# Patient Record
Sex: FEMALE | Race: WHITE | NOT HISPANIC OR LATINO | Employment: FULL TIME | URBAN - METROPOLITAN AREA
[De-identification: names, ages, dates, MRNs, and addresses within clinical notes are randomized per-mention and may not be internally consistent; named-entity substitution may affect disease eponyms.]

---

## 2017-03-30 ENCOUNTER — OFFICE VISIT (OUTPATIENT)
Dept: FAMILY MEDICINE | Facility: CLINIC | Age: 52
End: 2017-03-30
Payer: COMMERCIAL

## 2017-03-30 VITALS
HEART RATE: 50 BPM | OXYGEN SATURATION: 98 % | HEIGHT: 63 IN | TEMPERATURE: 98.1 F | BODY MASS INDEX: 21.62 KG/M2 | SYSTOLIC BLOOD PRESSURE: 126 MMHG | DIASTOLIC BLOOD PRESSURE: 79 MMHG | WEIGHT: 122 LBS | RESPIRATION RATE: 10 BRPM

## 2017-03-30 DIAGNOSIS — Z12.11 SCREENING FOR COLON CANCER: ICD-10-CM

## 2017-03-30 DIAGNOSIS — Z13.6 CARDIOVASCULAR SCREENING; LDL GOAL LESS THAN 160: ICD-10-CM

## 2017-03-30 DIAGNOSIS — F32.9 MAJOR DEPRESSIVE DISORDER WITH SINGLE EPISODE, REMISSION STATUS UNSPECIFIED: ICD-10-CM

## 2017-03-30 DIAGNOSIS — Z77.122 EXPOSED TO NOISE: ICD-10-CM

## 2017-03-30 DIAGNOSIS — F32.5 MAJOR DEPRESSIVE DISORDER, SINGLE EPISODE, IN FULL REMISSION (H): ICD-10-CM

## 2017-03-30 DIAGNOSIS — Z00.00 ROUTINE GENERAL MEDICAL EXAMINATION AT A HEALTH CARE FACILITY: Primary | ICD-10-CM

## 2017-03-30 PROCEDURE — 99396 PREV VISIT EST AGE 40-64: CPT | Performed by: FAMILY MEDICINE

## 2017-03-30 RX ORDER — ESCITALOPRAM OXALATE 20 MG/1
20 TABLET ORAL DAILY
Qty: 90 TABLET | Refills: 0 | Status: SHIPPED | OUTPATIENT
Start: 2017-03-30 | End: 2017-11-07

## 2017-03-30 ASSESSMENT — ANXIETY QUESTIONNAIRES
6. BECOMING EASILY ANNOYED OR IRRITABLE: NEARLY EVERY DAY
1. FEELING NERVOUS, ANXIOUS, OR ON EDGE: NEARLY EVERY DAY
3. WORRYING TOO MUCH ABOUT DIFFERENT THINGS: NEARLY EVERY DAY
IF YOU CHECKED OFF ANY PROBLEMS ON THIS QUESTIONNAIRE, HOW DIFFICULT HAVE THESE PROBLEMS MADE IT FOR YOU TO DO YOUR WORK, TAKE CARE OF THINGS AT HOME, OR GET ALONG WITH OTHER PEOPLE: VERY DIFFICULT
GAD7 TOTAL SCORE: 21
5. BEING SO RESTLESS THAT IT IS HARD TO SIT STILL: NEARLY EVERY DAY
7. FEELING AFRAID AS IF SOMETHING AWFUL MIGHT HAPPEN: NEARLY EVERY DAY
2. NOT BEING ABLE TO STOP OR CONTROL WORRYING: NEARLY EVERY DAY

## 2017-03-30 ASSESSMENT — PATIENT HEALTH QUESTIONNAIRE - PHQ9: 5. POOR APPETITE OR OVEREATING: NEARLY EVERY DAY

## 2017-03-30 NOTE — NURSING NOTE
"Chief Complaint   Patient presents with     Physical       Initial /79  Pulse 50  Temp 98.1  F (36.7  C) (Oral)  Resp 10  Ht 5' 2.75\" (1.594 m)  Wt 122 lb (55.3 kg)  LMP 06/11/2016  SpO2 98%  BMI 21.78 kg/m2 Estimated body mass index is 21.78 kg/(m^2) as calculated from the following:    Height as of this encounter: 5' 2.75\" (1.594 m).    Weight as of this encounter: 122 lb (55.3 kg).  Medication Reconciliation: complete     Melissa Orellana MA     "

## 2017-03-30 NOTE — PROGRESS NOTES
"   SUBJECTIVE:     CC: Aliyah Askew is an 51 year old woman who presents for preventive health visit.     Physical   Annual:     Getting at least 3 servings of Calcium per day::  Yes    Bi-annual eye exam::  Yes    Dental care twice a year::  NO    Sleep apnea or symptoms of sleep apnea::  Daytime drowsiness    Diet::  Regular (no restrictions)    Frequency of exercise::  2-3 days/week    Duration of exercise::  45-60 minutes    Taking medications regularly::  Yes    Medication side effects::  None    Additional concerns today::  YES    Answers for HPI/ROS submitted by the patient on 3/30/2017   Q1: Little interest or pleasure in doing things: 1=Several days  Q2: Feeling down, depressed or hopeless: 3=Nearly every day  PHQ-2 Score: 4      Her stress and anxiety level have increased all dt a bad neighbor situation she is dealing with. She has lost 12 pounds dealing with this. It has made her feel \"crazy\". It is mentally disturbing and she is having difficulty sleeping. Her neighbor has been working out of his garage as a DJ and there is noise from the bass that is fairly constant through the day and late into the night. Using white noise to help her sleep and has considered getting ear buds to drown out the noise. They have gone to mediation for this issue without resolution yet.         Today's PHQ-2 Score:   PHQ-2 ( 1999 Pf[employer]) 3/30/2017   Little interest or pleasure in doing things Several days   Feeling down, depressed or hopeless Nearly every day   PHQ-2 Score 4     Abuse: Current or Past(Physical, Sexual or Emotional)- No  Do you feel safe in your environment - No    Social History   Substance Use Topics     Smoking status: Never Smoker     Smokeless tobacco: Never Used     Alcohol use Yes      Comment: 5 drinks per week     The patient does not drink >3 drinks per day nor >7 drinks per week.    Recent Labs   Lab Test  10/26/12   1116  05/06/10   1212   CHOL  184  166   HDL  60  70   LDL  108  88   TRIG  79 "  43   [facility]  3.1  2.4   Reviewed orders with patient.  Reviewed health maintenance and updated orders accordingly - Yes    Mammo Decision Support:  Patient over age 50, mutual decision to screen reflected in health maintenance.  Pertinent mammograms are reviewed under the imaging tab.  History of abnormal Pap smear:   NO - age 30- 65 PAP every 3 years recommended  Last 3 Pap Results:   PAP (no units)   Date Value   2016 NIL   2013 NIL   2010 OTHER-NIL EM>40     Reviewed and updated as needed this visit by clinical staff  Reviewed and updated as needed this visit by Provider  Past Medical History:   Diagnosis Date     Closed fracture of unspecified bone     left fifth metacarpal fracture - cast     Heart murmur      Leukopenia      and      Leukopenia      Major depression       Past Surgical History:   Procedure Laterality Date     BIOPSY  16     HYSTERECTOMY, SUPRACERVICAL LAPAROSCOPIC  6/15/16     LAPAROSCOPIC HYSTERECTOMY TOTAL, SALPINGECTOMY BILATERAL N/A 6/15/2016    Procedure: LAPAROSCOPIC HYSTERECTOMY TOTAL, SALPINGECTOMY BILATERAL;  Surgeon: Kavita Manley MD;  Location: [facility] OR     LAPAROSCOPIC SALPINGECTOMY Bilateral 6/15/16     Obstetric History       T0      TAB0   SAB0   E0   M0   L0           ROS:   ROS: 10 point ROS neg other than the symptoms noted above in the HPI.    This document serves as a record of the services and decisions personally performed and made by Brooklynn Leyva MD. It was created on his/her behalf by Lyn Fay, trained medical scribe. The creation of this document is based the provider's statements to the medical scribes.    Scribe Lyn Fay, 2017  BP Readings from Last 3 Encounters:   17 126/79   16 123/70   10/12/16 145/79    Wt Readings from Last 3 Encounters:   17 55.3 kg (122 lb)   16 60.6 kg (133 lb 8 oz)   10/12/16 61.7 kg (136 lb 1.6 oz)        Patient Active Problem List    Diagnosis     CARDIOVASCULAR SCREENING; LDL GOAL LESS THAN 160     Major depression     Vitamin D deficiency     Leukopenia     S/P laparoscopic supracervical hysterectomy     Urinary urgency     Urge incontinence     Urgency incontinence     Somatic dysfunction of pelvic region     Chronic pain of right knee     Past Surgical History:   Procedure Laterality Date     BIOPSY  16     HYSTERECTOMY, SUPRACERVICAL LAPAROSCOPIC  6/15/16     LAPAROSCOPIC HYSTERECTOMY TOTAL, SALPINGECTOMY BILATERAL N/A 6/15/2016    Procedure: LAPAROSCOPIC HYSTERECTOMY TOTAL, SALPINGECTOMY BILATERAL;  Surgeon: Kavita Manley MD;  Location: [facility]     LAPAROSCOPIC SALPINGECTOMY Bilateral 6/15/16       Social History   Substance Use Topics     Smoking status: Never Smoker     Smokeless tobacco: Never Used     Alcohol use Yes      Comment: 5 drinks per week     Family History   Problem Relation Age of Onset     HEART DISEASE Father      early 40's had first episode, ?heart related, age 42     Anxiety Disorder Father      Obesity Father      Hypertension Mother      Asthma Mother      OSTEOPOROSIS Mother      Arthritis Mother      HEART DISEASE Mother      Obesity Sister      s/p gastric bypass     CANCER Maternal Aunt      brain tumor     Eye Disorder Brother      Eye Disorder Sister      DIABETES Brother      DIABETES Brother      Obesity Brother      Hypertension Sister      Anxiety Disorder Sister      Thyroid Disease Sister      Obesity Sister      Anxiety Disorder Brother          Current Outpatient Prescriptions   Medication Sig Dispense Refill     escitalopram (LEXAPRO) 20 MG tablet Take 1 tablet (20 mg) by mouth daily 90 tablet 0     Multiple Vitamins-Minerals (MULTIVITAMIN ADULT PO)        ibuprofen (ADVIL,MOTRIN) 600 MG tablet Take 1 tablet (600 mg) by mouth every 6 hours as needed for pain (mild) 30 tablet 0     VITAMIN D, CHOLECALCIFEROL, PO Take by mouth daily       Allergies   Allergen Reactions     Adhesive Tape       "Aminoglycosides      Ofloxacin      eye drops     Recent Labs   Lab Test  12/17/14   1226  10/26/12   1116  12/09/10   1025  05/06/10   1212   LDL   --   108   --   88   HDL   --   60   --   70   TRIG   --   79   --   43   CR  0.81   --    --    --    GFRESTIMATED  76   --    --    --    GFRESTBLACK  >90   GFR Calc     --    --    --    POTASSIUM  4.8   --    --    --    TSH  1.36   --   1.01  1.35      OBJECTIVE:     /79  Pulse 50  Temp 98.1  F (36.7  C) (Oral)  Resp 10  Ht 1.594 m (5' 2.75\")  Wt 55.3 kg (122 lb)  LMP 06/11/2016  SpO2 98%  BMI 21.78 kg/m2  EXAM:  GENERAL: healthy, alert and no distress  EYES: Eyes grossly normal to inspection, PERRL and conjunctivae and sclerae normal  HENT: ear canals and TM's normal, nose and mouth without ulcers or lesions  NECK: no adenopathy, no asymmetry, masses, or scars and thyroid normal to palpation  RESP: lungs clear to auscultation - no rales, rhonchi or wheezes  BREAST: normal without masses, tenderness or nipple discharge and no palpable axillary masses or adenopathy  CV: regular rate and rhythm, normal S1 S2, no S3 or S4, no murmur, click or rub, no peripheral edema and peripheral pulses strong  ABDOMEN: soft, nontender, no hepatosplenomegaly, no masses and bowel sounds normal  MS: no gross musculoskeletal defects noted, no edema  SKIN: no suspicious lesions or rashes  NEURO: Normal strength and tone, mentation intact and speech normal  PSYCH: mentation appears normal, affect normal/bright    ASSESSMENT/PLAN:     1. Routine general medical examination at a health care facility  -- MAMMO due 2018  -- PAP due 2019    2. Screening for colon cancer  She will schedule.   - GASTROENTEROLOGY ADULT REF PROCEDURE ONLY    3. CARDIOVASCULAR SCREENING; LDL GOAL LESS THAN 160  Fasting labs today.     4. Exposed to noise  She requests a referral to ENT for tips on help with how to manage this exposure to noise.   - OTOLARYNGOLOGY REFERRAL    5. Major " "depressive disorder, single episode, in full remission (H)  Increased anxiety dt stressors with neighbor. She would like to restart lexapro. She will start Lexapro 20 MG and f/u with me in 1-2 months.   - escitalopram (LEXAPRO) 20 MG tablet; Take 1 tablet (20 mg) by mouth daily  Dispense: 90 tablet; Refill: 0      COUNSELING:  Reviewed preventive health counseling, as reflected in patient instructions  BP Screening:   Last 3 BP Readings:    BP Readings from Last 3 Encounters:   03/30/17 126/79   12/06/16 123/70   10/12/16 145/79   The following was recommended to the patient:  Re-screen BP within a year and recommended lifestyle modifications   reports that she has never smoked. She has never used smokeless tobacco.  Estimated body mass index is 21.78 kg/(m^2) as calculated from the following:    Height as of this encounter: 1.594 m (5' 2.75\").    Weight as of this encounter: 55.3 kg (122 lb).   Weight management plan noted, stable and monitoring    Counseling Resources:  ATP IV Guidelines  Pooled Cohorts Equation Calculator  Breast Cancer Risk Calculator  FRAX Risk Assessment  ICSI Preventive Guidelines  Dietary Guidelines for Americans, 2010  USDA's MyPlate  ASA Prophylaxis  Lung CA Screening    The information in this document, created by the medical scribe for me, accurately reflects the services I personally performed and the decisions made by me. I have reviewed and approved this document for accuracy prior to leaving the patient care area. 03/30/17    Brooklynn Leyva MD  [facility] [place]      "

## 2017-03-30 NOTE — PATIENT INSTRUCTIONS
1. Schedule a colonoscopy.  2. You can schedule with ENT.     Preventive Health Recommendations  Female Ages 50 - 64    Yearly exam: See your health care provider every year in order to  o Review health changes.   o Discuss preventive care.    o Review your medicines if your doctor has prescribed any.      Get a Pap test every three years (unless you have an abnormal result and your provider advises testing more often).    If you get Pap tests with HPV test, you only need to test every 5 years, unless you have an abnormal result.     You do not need a Pap test if your uterus was removed (hysterectomy) and you have not had cancer.    You should be tested each year for STDs (sexually transmitted diseases) if you're at risk.     Have a mammogram every 1 to 2 years.    Have a colonoscopy at age 50, or have a yearly FIT test (stool test). These exams screen for colon cancer.      Have a cholesterol test every 5 years, or more often if advised.    Have a diabetes test (fasting glucose) every three years. If you are at risk for diabetes, you should have this test more often.     If you are at risk for osteoporosis (brittle bone disease), think about having a bone density scan (DEXA).    Shots: Get a flu shot each year. Get a tetanus shot every 10 years.    Nutrition:     Eat at least 5 servings of fruits and vegetables each day.    Eat whole-grain bread, whole-wheat pasta and brown rice instead of white grains and rice.    Talk to your provider about Calcium and Vitamin D.     Lifestyle    Exercise at least 150 minutes a week (30 minutes a day, 5 days a week). This will help you control your weight and prevent disease.    Limit alcohol to one drink per day.    No smoking.     Wear sunscreen to prevent skin cancer.     See your dentist every six months for an exam and cleaning.    See your eye doctor every 1 to 2 years.

## 2017-03-31 ASSESSMENT — ANXIETY QUESTIONNAIRES: GAD7 TOTAL SCORE: 21

## 2017-05-15 ENCOUNTER — TELEPHONE (OUTPATIENT)
Dept: FAMILY MEDICINE | Facility: CLINIC | Age: 52
End: 2017-05-15

## 2017-05-15 ENCOUNTER — OFFICE VISIT (OUTPATIENT)
Dept: FAMILY MEDICINE | Facility: CLINIC | Age: 52
End: 2017-05-15
Payer: COMMERCIAL

## 2017-05-15 VITALS
TEMPERATURE: 99 F | DIASTOLIC BLOOD PRESSURE: 64 MMHG | WEIGHT: 125 LBS | HEART RATE: 88 BPM | BODY MASS INDEX: 22.32 KG/M2 | SYSTOLIC BLOOD PRESSURE: 108 MMHG | RESPIRATION RATE: 16 BRPM | OXYGEN SATURATION: 98 %

## 2017-05-15 DIAGNOSIS — F32.9 MAJOR DEPRESSIVE DISORDER WITH SINGLE EPISODE, REMISSION STATUS UNSPECIFIED: ICD-10-CM

## 2017-05-15 DIAGNOSIS — Z12.11 SCREEN FOR COLON CANCER: ICD-10-CM

## 2017-05-15 DIAGNOSIS — F41.1 GAD (GENERALIZED ANXIETY DISORDER): ICD-10-CM

## 2017-05-15 DIAGNOSIS — N89.8 VAGINAL DISCHARGE: Primary | ICD-10-CM

## 2017-05-15 LAB
MICRO REPORT STATUS: ABNORMAL
SPECIMEN SOURCE: ABNORMAL
WET PREP SPEC: ABNORMAL

## 2017-05-15 PROCEDURE — 87210 SMEAR WET MOUNT SALINE/INK: CPT | Performed by: NURSE PRACTITIONER

## 2017-05-15 PROCEDURE — 99213 OFFICE O/P EST LOW 20 MIN: CPT | Performed by: NURSE PRACTITIONER

## 2017-05-15 RX ORDER — FLUCONAZOLE 150 MG/1
150 TABLET ORAL ONCE
Qty: 1 TABLET | Refills: 0 | Status: SHIPPED | OUTPATIENT
Start: 2017-05-15 | End: 2017-09-01

## 2017-05-15 ASSESSMENT — ANXIETY QUESTIONNAIRES
5. BEING SO RESTLESS THAT IT IS HARD TO SIT STILL: MORE THAN HALF THE DAYS
7. FEELING AFRAID AS IF SOMETHING AWFUL MIGHT HAPPEN: MORE THAN HALF THE DAYS
2. NOT BEING ABLE TO STOP OR CONTROL WORRYING: NEARLY EVERY DAY
GAD7 TOTAL SCORE: 15
IF YOU CHECKED OFF ANY PROBLEMS ON THIS QUESTIONNAIRE, HOW DIFFICULT HAVE THESE PROBLEMS MADE IT FOR YOU TO DO YOUR WORK, TAKE CARE OF THINGS AT HOME, OR GET ALONG WITH OTHER PEOPLE: VERY DIFFICULT
6. BECOMING EASILY ANNOYED OR IRRITABLE: MORE THAN HALF THE DAYS
3. WORRYING TOO MUCH ABOUT DIFFERENT THINGS: MORE THAN HALF THE DAYS
1. FEELING NERVOUS, ANXIOUS, OR ON EDGE: MORE THAN HALF THE DAYS

## 2017-05-15 ASSESSMENT — PATIENT HEALTH QUESTIONNAIRE - PHQ9: 5. POOR APPETITE OR OVEREATING: MORE THAN HALF THE DAYS

## 2017-05-15 NOTE — PATIENT INSTRUCTIONS
1.  For yeast infection take fluconazole 1 dose.  Consider asking your partner to get treated.  Sleep without underwear at night, change underwear right away after exercise.  2.  You can contact our clinic anytime to schedule an appointment with Henry Edwards, our behavioralist here at [facility].

## 2017-05-15 NOTE — TELEPHONE ENCOUNTER
Aliyah Askew is a 51 year old female who calls with possible yeast infection.    NURSING ASSESSMENT:  Description:  Patient calling requesting diflucan for yeast infection  Onset/duration:  2-3 days  Precip. factors:  Hx yeast infections  Associated symptoms:    1. Vaginal itching  2. Discharge - clear  3. Denies fever, new low back, burning/pain with urination, odor, cloudy urine  4. States urgency/frequency is an ongoing issue        Last exam/Treatment:  3/30/2017  Allergies:   Allergies   Allergen Reactions     Adhesive Tape      Aminoglycosides      Ofloxacin      eye drops       MEDICATIONS:   Taking medication(s) as prescribed? N/A  Taking over the counter medication(s?) No  Any medication side effects? No significant side effects    Any barriers to taking medication(s) as prescribed?  No  Medication(s) improving/managing symptoms?  N/A  Medication reconciliation completed: No      NURSING PLAN: Nursing advice to patient advised office visit for new symptoms, orders, and medications - patient verbalized understanding scheduled 5/15/2017    RECOMMENDED DISPOSITION:  See in 72 hours - see above  Will comply with recommendation: Yes  If further questions/concerns or if symptoms do not improve, worsen or new symptoms develop, call your PCP or [facility] as soon as possible.      Guideline used:  Telephone Triage Protocols for Nurses, Fifth Edition, Lizette Dunlap RN

## 2017-05-15 NOTE — PROGRESS NOTES
SUBJECTIVE:                                                    Aliyah Askew is a 51 year old female who presents to clinic today for the following health issues:      Vaginal Symptoms      Duration: 3 days    Description   itching, externally    Intensity:  moderate    Accompanying signs and symptoms (fever/dysuria/abdominal or back pain): None    History  Sexually active: yes, single partner, contraception - hysterectomy  Possibility of pregnancy: No  Recent antibiotic use: no     Precipitating or alleviating factors: recently sexually active for first time in over a year, seem to always get yeast infections with this particular partent    Therapies tried and outcome: none   Outcome: N/A     history of recurrent yeast infections, last episode 2013.      History of depression and anxiety, living in house 21 years and has bad neighbor situation and wondering if she might need to sell. Built giant [employer], garage faces her house, they are running DJ business out of garage.  Restarted lexapro this winter which is helping with anxiety.  Has done counseling in the past.  The only option she has is to call the police on them.    Patient Active Problem List   Diagnosis     CARDIOVASCULAR SCREENING; LDL GOAL LESS THAN 160     Major depression     Vitamin D deficiency     Leukopenia     S/P laparoscopic supracervical hysterectomy     Urinary urgency     Urge incontinence     Urgency incontinence     Somatic dysfunction of pelvic region     Chronic pain of right knee     WALLACE (generalized anxiety disorder)     Past Surgical History:   Procedure Laterality Date     BIOPSY  5/9/16     HYSTERECTOMY, SUPRACERVICAL LAPAROSCOPIC  6/15/16     LAPAROSCOPIC HYSTERECTOMY TOTAL, SALPINGECTOMY BILATERAL N/A 6/15/2016    Procedure: LAPAROSCOPIC HYSTERECTOMY TOTAL, SALPINGECTOMY BILATERAL;  Surgeon: Kavita Manley MD;  Location: [facility]     LAPAROSCOPIC SALPINGECTOMY Bilateral 6/15/16       Social History   Substance Use Topics      Smoking status: Never Smoker     Smokeless tobacco: Never Used     Alcohol use Yes      Comment: 5 drinks per week     Family History   Problem Relation Age of Onset     HEART DISEASE Father      early 40's had first episode, ?heart related, age 42     Anxiety Disorder Father      Obesity Father      Hypertension Mother      Asthma Mother      OSTEOPOROSIS Mother      Arthritis Mother      HEART DISEASE Mother      Obesity Sister      s/p gastric bypass     CANCER Maternal Aunt      brain tumor     Eye Disorder Brother      Eye Disorder Sister      DIABETES Brother      DIABETES Brother      Obesity Brother      Hypertension Sister      Anxiety Disorder Sister      Thyroid Disease Sister      Obesity Sister      Anxiety Disorder Brother          Current Outpatient Prescriptions   Medication Sig Dispense Refill     fluconazole (DIFLUCAN) 150 MG tablet Take 1 tablet (150 mg) by mouth once for 1 dose 1 tablet 0     escitalopram (LEXAPRO) 20 MG tablet Take 1 tablet (20 mg) by mouth daily 90 tablet 0     Multiple Vitamins-Minerals (MULTIVITAMIN ADULT PO)        ibuprofen (ADVIL,MOTRIN) 600 MG tablet Take 1 tablet (600 mg) by mouth every 6 hours as needed for pain (mild) 30 tablet 0     VITAMIN D, CHOLECALCIFEROL, PO Take by mouth daily         ROS:  , Psych as above, otherwise negative       OBJECTIVE:                                                    /64 (BP Location: Right arm, Patient Position: Chair, Cuff Size: Adult Regular)  Pulse 88  Temp 99  F (37.2  C) (Oral)  Resp 16  Wt 125 lb (56.7 kg)  LMP 2016  SpO2 98%  BMI 22.32 kg/m2   GENERAL APPEARANCE: healthy, alert and no distress  EYES: Eyes grossly normal to inspection and conjunctivae and sclerae normal  RESP: respirations nonlabored  PSYCH: mentation appears normal and affect normal/bright       Diagnostic test results:  Diagnostic Test Results:  Results for orders placed or performed in visit on 05/15/17 (from the past 24 hour(s))    Wet prep   Result Value Ref Range    Specimen Description Vagina     Wet Prep (A)      No Trichomonas seen  Yeast seen  No clue cells seen      Micro Report Status FINAL 05/15/2017         ASSESSMENT/PLAN:                                                    (N89.8) Vaginal discharge  (primary encounter diagnosis)  Plan: Wet prep, fluconazole (DIFLUCAN) 150 MG tablet        Recommend considering having partner be treated if recurrent yeast infections triggered by intercourse.  Discussed other  Strategies to prevent yeast infection.  One time dose fluconazole., discussed no alcohol use x 24h.    (F32.9) Major depressive disorder with single episode, remission status unspecified  (F41.1) WALLACE (generalized anxiety disorder)  Comment: worse due to neighbor situation  Plan: discussed considering counseling or medication change, she strongly feels symptoms are related to neighbor situation and does not want to alter treatment plan.    (Z12.11) Screen for colon cancer  Plan: she will schedule colo        See Patient Instructions    Martine Woody, [facility]    Patient Instructions   1.  For yeast infection take fluconazole 1 dose.  Consider asking your partner to get treated.  Sleep without underwear at night, change underwear right away after exercise.  2.  You can contact our clinic anytime to schedule an appointment with Henry Edwards, our behavioralist here at [facility] [place].

## 2017-05-15 NOTE — TELEPHONE ENCOUNTER
Patient called requesting rx for Diflucan for yeast infection and states she has gotten in past which is helpful    If ok no need to call patient unless you have questions    Pharmacy is loaded    Ok to leave message

## 2017-05-15 NOTE — NURSING NOTE
"Chief Complaint   Patient presents with     Vaginal Problem       Initial /64 (BP Location: Right arm, Patient Position: Chair, Cuff Size: Adult Regular)  Pulse 88  Temp 99  F (37.2  C) (Oral)  Resp 16  Wt 125 lb (56.7 kg)  LMP 06/11/2016  SpO2 98%  BMI 22.32 kg/m2 Estimated body mass index is 22.32 kg/(m^2) as calculated from the following:    Height as of 3/30/17: 5' 2.75\" (1.594 m).    Weight as of this encounter: 125 lb (56.7 kg).  Medication Reconciliation: complete     Renu Rob, CMA    "

## 2017-05-15 NOTE — MR AVS SNAPSHOT
"              After Visit Summary   5/15/2017    Aliyah Askew    MRN: [id number]           Patient Information     Date Of Birth          1965        Visit Information        Provider Department      5/15/2017 1:20 PM Martine Woody APRN CNP [facility]        Today's Diagnoses     Vaginal discharge    -  1    Major depressive disorder with single episode, remission status unspecified        WALLACE (generalized anxiety disorder)          Care Instructions    1.  For yeast infection take fluconazole 1 dose.  Consider asking your partner to get treated.  Sleep without underwear at night, change underwear right away after exercise.  2.  You can contact our clinic anytime to schedule an appointment with eHnry Edwards, our behavioralist here at [facility].            Follow-ups after your visit        Who to contact     If you have questions or need follow up information about today's clinic visit or your schedule please contact [facility] directly at [phone].  Normal or non-critical lab and imaging results will be communicated to you by MyChart, letter or phone within 4 business days after the clinic has received the results. If you do not hear from us within 7 days, please contact the clinic through [employer] or phone. If you have a critical or abnormal lab result, we will notify you by phone as soon as possible.  Submit refill requests through [employer] or call your pharmacy and they will forward the refill request to us. Please allow 3 business days for your refill to be completed.          Additional Information About Your Visit        [employer] Information     [employer] lets you send messages to your doctor, view your test results, renew your prescriptions, schedule appointments and more. To sign up, go to [email or web address] . Click on \"Log in\" on the left side of the screen, which will take you to the Welcome page. Then click on \"Sign up Now\" on the right side of " the page.     You will be asked to enter the access code listed below, as well as some personal information. Please follow the directions to create your username and password.     Your access code is: [email or web address]  Expires: 2017 10:12 AM     Your access code will  in 90 days. If you need help or a new code, please call your [place] clinic or [phone].        Care EveryWhere ID     This is your Care EveryWhere ID. This could be used by other organizations to access your [place] medical records  [id number]        Your Vitals Were     Pulse Temperature Respirations Last Period Pulse Oximetry BMI (Body Mass Index)    88 99  F (37.2  C) (Oral) 16 2016 98% 22.32 kg/m2       Blood Pressure from Last 3 Encounters:   05/15/17 108/64   17 126/79   16 123/70    Weight from Last 3 Encounters:   05/15/17 125 lb (56.7 kg)   17 122 lb (55.3 kg)   16 133 lb 8 oz (60.6 kg)              We Performed the Following     Wet prep          Today's Medication Changes          These changes are accurate as of: 5/15/17  2:04 PM.  If you have any questions, ask your nurse or doctor.               Start taking these medicines.        Dose/Directions    fluconazole 150 MG tablet   Commonly known as:  DIFLUCAN   Used for:  Vaginal discharge   Started by:  Martine Woody APRN CNP        Dose:  150 mg   Take 1 tablet (150 mg) by mouth once for 1 dose   Quantity:  1 tablet   Refills:  0            Where to get your medicines      These medications were sent to [facility] - [place], [place] - [address]  [address], [facility] [address]     Phone:  [phone]     fluconazole 150 MG tablet                Primary Care Provider Office Phone # Fax #    Brooklynn Leyva [phone]       [facility] [address]  [facility] [address]        Thank you!     Thank you for choosing [facility]  for your care. Our goal is always to  provide you with excellent care. Hearing back from our patients is one way we can continue to improve our services. Please take a few minutes to complete the written survey that you may receive in the mail after your visit with us. Thank you!             Your Updated Medication List - Protect others around you: Learn how to safely use, store and throw away your medicines at www.disposemymeds.org.          This list is accurate as of: 5/15/17  2:04 PM.  Always use your most recent med list.                   Brand Name Dispense Instructions for use    escitalopram 20 MG tablet    LEXAPRO    90 tablet    Take 1 tablet (20 mg) by mouth daily       fluconazole 150 MG tablet    DIFLUCAN    1 tablet    Take 1 tablet (150 mg) by mouth once for 1 dose       ibuprofen 600 MG tablet    ADVIL/MOTRIN    30 tablet    Take 1 tablet (600 mg) by mouth every 6 hours as needed for pain (mild)       MULTIVITAMIN ADULT PO          VITAMIN D (CHOLECALCIFEROL) PO      Take by mouth daily

## 2017-05-16 ASSESSMENT — ANXIETY QUESTIONNAIRES: GAD7 TOTAL SCORE: 15

## 2017-05-16 ASSESSMENT — PATIENT HEALTH QUESTIONNAIRE - PHQ9: SUM OF ALL RESPONSES TO PHQ QUESTIONS 1-9: 14

## 2017-05-22 ENCOUNTER — OFFICE VISIT (OUTPATIENT)
Dept: BEHAVIORAL HEALTH | Facility: CLINIC | Age: 52
End: 2017-05-22
Payer: COMMERCIAL

## 2017-05-22 DIAGNOSIS — F43.23 ADJUSTMENT DISORDER WITH MIXED ANXIETY AND DEPRESSED MOOD: Primary | ICD-10-CM

## 2017-05-22 PROCEDURE — 90791 PSYCH DIAGNOSTIC EVALUATION: CPT | Performed by: SOCIAL WORKER

## 2017-05-22 NOTE — MR AVS SNAPSHOT
"              After Visit Summary   5/22/2017    Aliyah Askew    MRN: [id number]           Patient Information     Date Of Birth          1965        Visit Information        Provider Department      5/22/2017 11:00 AM Henry Edwards, [facility]        Today's Diagnoses     Adjustment disorder with mixed anxiety and depressed mood    -  1       Follow-ups after your visit        Your next 10 appointments already scheduled     May 30, 2017 10:30 AM CDT   Return Visit with KAYLEE Rodriguez   [facility] ([facility])    [address] [place] [id number] [phone]              Who to contact     If you have questions or need follow up information about today's clinic visit or your schedule please contact [facility] directly at [phone].  Normal or non-critical lab and imaging results will be communicated to you by MyChart, letter or phone within 4 business days after the clinic has received the results. If you do not hear from us within 7 days, please contact the clinic through MyChart or phone. If you have a critical or abnormal lab result, we will notify you by phone as soon as possible.  Submit refill requests through [employer] or call your pharmacy and they will forward the refill request to us. Please allow 3 business days for your refill to be completed.          Additional Information About Your Visit        MyChart Information     [employer] lets you send messages to your doctor, view your test results, renew your prescriptions, schedule appointments and more. To sign up, go to [email or web address] . Click on \"Log in\" on the left side of the screen, which will take you to the Welcome page. Then click on \"Sign up Now\" on the right side of the page.     You will be asked to enter the access code listed below, as well as some personal information. Please follow the directions to create your username and " password.     Your access code is: [email or web address]  Expires: 2017 10:12 AM     Your access code will  in 90 days. If you need help or a new code, please call your [place] clinic or [phone].        Care EveryWhere ID     This is your Care EveryWhere ID. This could be used by other organizations to access your [place] medical records  [id number]        Your Vitals Were     Last Period                   2016            Blood Pressure from Last 3 Encounters:   05/15/17 108/64   17 126/79   16 123/70    Weight from Last 3 Encounters:   05/15/17 56.7 kg (125 lb)   17 55.3 kg (122 lb)   16 60.6 kg (133 lb 8 oz)              Today, you had the following     No orders found for display       Primary Care Provider Office Phone # Fax #    Brooklynn Leyva [phone]       [facility] [address] [facility] [address]        Thank you!     Thank you for choosing [facility]  for your care. Our goal is always to provide you with excellent care. Hearing back from our patients is one way we can continue to improve our services. Please take a few minutes to complete the written survey that you may receive in the mail after your visit with us. Thank you!             Your Updated Medication List - Protect others around you: Learn how to safely use, store and throw away your medicines at www.disposemymeds.org.          This list is accurate as of: 17 11:59 PM.  Always use your most recent med list.                   Brand Name Dispense Instructions for use    escitalopram 20 MG tablet    LEXAPRO    90 tablet    Take 1 tablet (20 mg) by mouth daily       ibuprofen 600 MG tablet    ADVIL/MOTRIN    30 tablet    Take 1 tablet (600 mg) by mouth every 6 hours as needed for pain (mild)       MULTIVITAMIN ADULT PO          VITAMIN D (CHOLECALCIFEROL) PO      Take by mouth daily

## 2017-05-30 ENCOUNTER — OFFICE VISIT (OUTPATIENT)
Dept: BEHAVIORAL HEALTH | Facility: CLINIC | Age: 52
End: 2017-05-30
Payer: COMMERCIAL

## 2017-05-30 DIAGNOSIS — F33.1 MODERATE EPISODE OF RECURRENT MAJOR DEPRESSIVE DISORDER (H): Primary | ICD-10-CM

## 2017-05-30 PROCEDURE — 90837 PSYTX W PT 60 MINUTES: CPT | Performed by: SOCIAL WORKER

## 2017-05-30 ASSESSMENT — ANXIETY QUESTIONNAIRES
3. WORRYING TOO MUCH ABOUT DIFFERENT THINGS: MORE THAN HALF THE DAYS
1. FEELING NERVOUS, ANXIOUS, OR ON EDGE: MORE THAN HALF THE DAYS
2. NOT BEING ABLE TO STOP OR CONTROL WORRYING: MORE THAN HALF THE DAYS
5. BEING SO RESTLESS THAT IT IS HARD TO SIT STILL: SEVERAL DAYS
GAD7 TOTAL SCORE: 13
IF YOU CHECKED OFF ANY PROBLEMS ON THIS QUESTIONNAIRE, HOW DIFFICULT HAVE THESE PROBLEMS MADE IT FOR YOU TO DO YOUR WORK, TAKE CARE OF THINGS AT HOME, OR GET ALONG WITH OTHER PEOPLE: VERY DIFFICULT
7. FEELING AFRAID AS IF SOMETHING AWFUL MIGHT HAPPEN: MORE THAN HALF THE DAYS
6. BECOMING EASILY ANNOYED OR IRRITABLE: MORE THAN HALF THE DAYS

## 2017-05-30 ASSESSMENT — PATIENT HEALTH QUESTIONNAIRE - PHQ9: 5. POOR APPETITE OR OVEREATING: MORE THAN HALF THE DAYS
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
## 2017-05-30 NOTE — MR AVS SNAPSHOT
"              After Visit Summary   2017    Aliyah Askew    MRN: [id number]           Patient Information     Date Of Birth          1965        Visit Information        Provider Department      2017 10:30 AM Henry Edwards, [facility]        Today's Diagnoses     Moderate episode of recurrent major depressive disorder (H)    -  1       Follow-ups after your visit        Who to contact     If you have questions or need follow up information about today's clinic visit or your schedule please contact [facility] directly at [phone].  Normal or non-critical lab and imaging results will be communicated to you by [employer], letter or phone within 4 business days after the clinic has received the results. If you do not hear from us within 7 days, please contact the clinic through [employer] or phone. If you have a critical or abnormal lab result, we will notify you by phone as soon as possible.  Submit refill requests through [employer] or call your pharmacy and they will forward the refill request to us. Please allow 3 business days for your refill to be completed.          Additional Information About Your Visit        MyChart Information     [employer] lets you send messages to your doctor, view your test results, renew your prescriptions, schedule appointments and more. To sign up, go to [email or web address] . Click on \"Log in\" on the left side of the screen, which will take you to the Welcome page. Then click on \"Sign up Now\" on the right side of the page.     You will be asked to enter the access code listed below, as well as some personal information. Please follow the directions to create your username and password.     Your access code is: [email or web address]  Expires: 2017 10:12 AM     Your access code will  in 90 days. If you need help or a new code, please call your [facility] or [phone].        Care EveryWhere ID     This is your Care " EveryWhere ID. This could be used by other organizations to access your [facility] medical records  [id number]        Your Vitals Were     Last Period                   06/11/2016            Blood Pressure from Last 3 Encounters:   05/15/17 108/64   03/30/17 126/79   12/06/16 123/70    Weight from Last 3 Encounters:   05/15/17 56.7 kg (125 lb)   03/30/17 55.3 kg (122 lb)   12/06/16 60.6 kg (133 lb 8 oz)              Today, you had the following     No orders found for display       Primary Care Provider Office Phone # Fax #    Brooklynn Leyva [phone]       [facility] [address] [facility] [address]        Thank you!     Thank you for choosing [facility]  for your care. Our goal is always to provide you with excellent care. Hearing back from our patients is one way we can continue to improve our services. Please take a few minutes to complete the written survey that you may receive in the mail after your visit with us. Thank you!             Your Updated Medication List - Protect others around you: Learn how to safely use, store and throw away your medicines at www.disposemymeds.org.          This list is accurate as of: 5/30/17  1:54 PM.  Always use your most recent med list.                   Brand Name Dispense Instructions for use    escitalopram 20 MG tablet    LEXAPRO    90 tablet    Take 1 tablet (20 mg) by mouth daily       ibuprofen 600 MG tablet    ADVIL/MOTRIN    30 tablet    Take 1 tablet (600 mg) by mouth every 6 hours as needed for pain (mild)       MULTIVITAMIN ADULT PO          VITAMIN D (CHOLECALCIFEROL) PO      Take by mouth daily

## 2017-05-31 ASSESSMENT — ANXIETY QUESTIONNAIRES
GAD7 TOTAL SCORE: 13
5. BEING SO RESTLESS THAT IT IS HARD TO SIT STILL: SEVERAL DAYS
3. WORRYING TOO MUCH ABOUT DIFFERENT THINGS: MORE THAN HALF THE DAYS
GAD7 TOTAL SCORE: 13
6. BECOMING EASILY ANNOYED OR IRRITABLE: MORE THAN HALF THE DAYS
1. FEELING NERVOUS, ANXIOUS, OR ON EDGE: MORE THAN HALF THE DAYS
2. NOT BEING ABLE TO STOP OR CONTROL WORRYING: MORE THAN HALF THE DAYS
7. FEELING AFRAID AS IF SOMETHING AWFUL MIGHT HAPPEN: MORE THAN HALF THE DAYS

## 2017-05-31 ASSESSMENT — PATIENT HEALTH QUESTIONNAIRE - PHQ9: 5. POOR APPETITE OR OVEREATING: MORE THAN HALF THE DAYS

## 2017-06-01 ASSESSMENT — PATIENT HEALTH QUESTIONNAIRE - PHQ9: SUM OF ALL RESPONSES TO PHQ QUESTIONS 1-9: 15

## 2017-06-05 ENCOUNTER — OFFICE VISIT (OUTPATIENT)
Dept: BEHAVIORAL HEALTH | Facility: CLINIC | Age: 52
End: 2017-06-05
Payer: COMMERCIAL

## 2017-06-05 DIAGNOSIS — F41.1 GAD (GENERALIZED ANXIETY DISORDER): ICD-10-CM

## 2017-06-05 DIAGNOSIS — F33.1 MODERATE EPISODE OF RECURRENT MAJOR DEPRESSIVE DISORDER (H): Primary | ICD-10-CM

## 2017-06-05 PROCEDURE — 90837 PSYTX W PT 60 MINUTES: CPT | Performed by: SOCIAL WORKER

## 2017-06-05 NOTE — MR AVS SNAPSHOT
"              After Visit Summary   2017    Aliyah Askew    MRN: [id number]           Patient Information     Date Of Birth          1965        Visit Information        Provider Department      2017 11:00 AM Henry Edwards, [facility]        Today's Diagnoses     Moderate episode of recurrent major depressive disorder (H)    -  1    WALLACE (generalized anxiety disorder)           Follow-ups after your visit        Who to contact     If you have questions or need follow up information about today's clinic visit or your schedule please contact [facility] directly at [phone].  Normal or non-critical lab and imaging results will be communicated to you by [employer], letter or phone within 4 business days after the clinic has received the results. If you do not hear from us within 7 days, please contact the clinic through [employer] or phone. If you have a critical or abnormal lab result, we will notify you by phone as soon as possible.  Submit refill requests through [employer] or call your pharmacy and they will forward the refill request to us. Please allow 3 business days for your refill to be completed.          Additional Information About Your Visit        MyChart Information     [employer] lets you send messages to your doctor, view your test results, renew your prescriptions, schedule appointments and more. To sign up, go to [email or web address] . Click on \"Log in\" on the left side of the screen, which will take you to the Welcome page. Then click on \"Sign up Now\" on the right side of the page.     You will be asked to enter the access code listed below, as well as some personal information. Please follow the directions to create your username and password.     Your access code is: [email or web address]  Expires: 2017 10:12 AM     Your access code will  in 90 days. If you need help or a new code, please call your [facility] or [phone].        Care " EveryWhere ID     This is your Care EveryWhere ID. This could be used by other organizations to access your [facility] medical records  [id number]        Your Vitals Were     Last Period                   06/11/2016            Blood Pressure from Last 3 Encounters:   05/15/17 108/64   03/30/17 126/79   12/06/16 123/70    Weight from Last 3 Encounters:   05/15/17 56.7 kg (125 lb)   03/30/17 55.3 kg (122 lb)   12/06/16 60.6 kg (133 lb 8 oz)              Today, you had the following     No orders found for display       Primary Care Provider Office Phone # Fax #    Brooklynn Leyva [phone]       [facility] [address] [facility] [address]        Thank you!     Thank you for choosing [facility]  for your care. Our goal is always to provide you with excellent care. Hearing back from our patients is one way we can continue to improve our services. Please take a few minutes to complete the written survey that you may receive in the mail after your visit with us. Thank you!             Your Updated Medication List - Protect others around you: Learn how to safely use, store and throw away your medicines at www.disposemymeds.org.          This list is accurate as of: 6/5/17  2:58 PM.  Always use your most recent med list.                   Brand Name Dispense Instructions for use    escitalopram 20 MG tablet    LEXAPRO    90 tablet    Take 1 tablet (20 mg) by mouth daily       ibuprofen 600 MG tablet    ADVIL/MOTRIN    30 tablet    Take 1 tablet (600 mg) by mouth every 6 hours as needed for pain (mild)       MULTIVITAMIN ADULT PO          VITAMIN D (CHOLECALCIFEROL) PO      Take by mouth daily
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
## 2017-06-06 ASSESSMENT — ANXIETY QUESTIONNAIRES
5. BEING SO RESTLESS THAT IT IS HARD TO SIT STILL: NOT AT ALL
GAD7 TOTAL SCORE: 10
6. BECOMING EASILY ANNOYED OR IRRITABLE: SEVERAL DAYS
IF YOU CHECKED OFF ANY PROBLEMS ON THIS QUESTIONNAIRE, HOW DIFFICULT HAVE THESE PROBLEMS MADE IT FOR YOU TO DO YOUR WORK, TAKE CARE OF THINGS AT HOME, OR GET ALONG WITH OTHER PEOPLE: SOMEWHAT DIFFICULT
2. NOT BEING ABLE TO STOP OR CONTROL WORRYING: MORE THAN HALF THE DAYS
3. WORRYING TOO MUCH ABOUT DIFFERENT THINGS: MORE THAN HALF THE DAYS
7. FEELING AFRAID AS IF SOMETHING AWFUL MIGHT HAPPEN: SEVERAL DAYS
1. FEELING NERVOUS, ANXIOUS, OR ON EDGE: MORE THAN HALF THE DAYS

## 2017-06-06 ASSESSMENT — PATIENT HEALTH QUESTIONNAIRE - PHQ9: 5. POOR APPETITE OR OVEREATING: MORE THAN HALF THE DAYS

## 2017-06-07 ASSESSMENT — ANXIETY QUESTIONNAIRES: GAD7 TOTAL SCORE: 10

## 2017-06-07 ASSESSMENT — PATIENT HEALTH QUESTIONNAIRE - PHQ9: SUM OF ALL RESPONSES TO PHQ QUESTIONS 1-9: 12

## 2017-06-09 ENCOUNTER — OFFICE VISIT (OUTPATIENT)
Dept: BEHAVIORAL HEALTH | Facility: CLINIC | Age: 52
End: 2017-06-09
Payer: COMMERCIAL

## 2017-06-09 DIAGNOSIS — F33.1 MODERATE EPISODE OF RECURRENT MAJOR DEPRESSIVE DISORDER (H): Primary | ICD-10-CM

## 2017-06-09 PROCEDURE — 90837 PSYTX W PT 60 MINUTES: CPT | Performed by: SOCIAL WORKER

## 2017-06-09 ASSESSMENT — ANXIETY QUESTIONNAIRES
IF YOU CHECKED OFF ANY PROBLEMS ON THIS QUESTIONNAIRE, HOW DIFFICULT HAVE THESE PROBLEMS MADE IT FOR YOU TO DO YOUR WORK, TAKE CARE OF THINGS AT HOME, OR GET ALONG WITH OTHER PEOPLE: SOMEWHAT DIFFICULT
6. BECOMING EASILY ANNOYED OR IRRITABLE: MORE THAN HALF THE DAYS
3. WORRYING TOO MUCH ABOUT DIFFERENT THINGS: SEVERAL DAYS
GAD7 TOTAL SCORE: 11
5. BEING SO RESTLESS THAT IT IS HARD TO SIT STILL: SEVERAL DAYS
2. NOT BEING ABLE TO STOP OR CONTROL WORRYING: MORE THAN HALF THE DAYS
7. FEELING AFRAID AS IF SOMETHING AWFUL MIGHT HAPPEN: MORE THAN HALF THE DAYS
1. FEELING NERVOUS, ANXIOUS, OR ON EDGE: MORE THAN HALF THE DAYS

## 2017-06-09 ASSESSMENT — PATIENT HEALTH QUESTIONNAIRE - PHQ9: 5. POOR APPETITE OR OVEREATING: SEVERAL DAYS
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
## 2017-06-09 NOTE — MR AVS SNAPSHOT
"              After Visit Summary   6/9/2017    Aliyah Askew    MRN: [id number]           Patient Information     Date Of Birth          1965        Visit Information        Provider Department      6/9/2017 9:00 AM Henry Edwards, [facility]        Today's Diagnoses     Moderate episode of recurrent major depressive disorder (H)    -  1       Follow-ups after your visit        Your next 10 appointments already scheduled     Abelardo 15, 2017 10:30 AM CDT   Return Visit with KAYLEE Rodriguez   [facility] ([facility])    [address] [place] [id number] [phone]              Who to contact     If you have questions or need follow up information about today's clinic visit or your schedule please contact [facility] directly at [phone].  Normal or non-critical lab and imaging results will be communicated to you by MyChart, letter or phone within 4 business days after the clinic has received the results. If you do not hear from us within 7 days, please contact the clinic through MyChart or phone. If you have a critical or abnormal lab result, we will notify you by phone as soon as possible.  Submit refill requests through [employer] or call your pharmacy and they will forward the refill request to us. Please allow 3 business days for your refill to be completed.          Additional Information About Your Visit        MyChart Information     [employer] lets you send messages to your doctor, view your test results, renew your prescriptions, schedule appointments and more. To sign up, go to [email or web address] . Click on \"Log in\" on the left side of the screen, which will take you to the Welcome page. Then click on \"Sign up Now\" on the right side of the page.     You will be asked to enter the access code listed below, as well as some personal information. Please follow the directions to create your username and " password.     Your access code is: [email or web address]  Expires: 2017 10:12 AM     Your access code will  in 90 days. If you need help or a new code, please call your [facility] clinic or [phone].        Care EveryWhere ID     This is your Care EveryWhere ID. This could be used by other organizations to access your [facility] medical records  [id number]        Your Vitals Were     Last Period                   2016            Blood Pressure from Last 3 Encounters:   05/15/17 108/64   17 126/79   16 123/70    Weight from Last 3 Encounters:   05/15/17 56.7 kg (125 lb)   17 55.3 kg (122 lb)   16 60.6 kg (133 lb 8 oz)              Today, you had the following     No orders found for display       Primary Care Provider Office Phone # Fax #    Brooklynn Leyva [phone]       [facility] [address] [facility] [address]        Thank you!     Thank you for choosing [facility]  for your care. Our goal is always to provide you with excellent care. Hearing back from our patients is one way we can continue to improve our services. Please take a few minutes to complete the written survey that you may receive in the mail after your visit with us. Thank you!             Your Updated Medication List - Protect others around you: Learn how to safely use, store and throw away your medicines at www.disposemymeds.org.          This list is accurate as of: 17  1:40 PM.  Always use your most recent med list.                   Brand Name Dispense Instructions for use    escitalopram 20 MG tablet    LEXAPRO    90 tablet    Take 1 tablet (20 mg) by mouth daily       ibuprofen 600 MG tablet    ADVIL/MOTRIN    30 tablet    Take 1 tablet (600 mg) by mouth every 6 hours as needed for pain (mild)       MULTIVITAMIN ADULT PO          VITAMIN D (CHOLECALCIFEROL) PO      Take by mouth daily

## 2017-06-10 ASSESSMENT — ANXIETY QUESTIONNAIRES: GAD7 TOTAL SCORE: 11

## 2017-06-10 ASSESSMENT — PATIENT HEALTH QUESTIONNAIRE - PHQ9: SUM OF ALL RESPONSES TO PHQ QUESTIONS 1-9: 8

## 2017-06-15 ENCOUNTER — OFFICE VISIT (OUTPATIENT)
Dept: BEHAVIORAL HEALTH | Facility: CLINIC | Age: 52
End: 2017-06-15
Payer: COMMERCIAL

## 2017-06-15 DIAGNOSIS — F43.23 ADJUSTMENT DISORDER WITH MIXED ANXIETY AND DEPRESSED MOOD: Primary | ICD-10-CM

## 2017-06-15 PROCEDURE — 90834 PSYTX W PT 45 MINUTES: CPT | Performed by: SOCIAL WORKER

## 2017-06-15 NOTE — MR AVS SNAPSHOT
"              After Visit Summary   6/15/2017    Aliyah Askew    MRN: [id number]           Patient Information     Date Of Birth          1965        Visit Information        Provider Department      6/15/2017 10:30 AM Henry Edwards, [facility]        Today's Diagnoses     Adjustment disorder with mixed anxiety and depressed mood    -  1       Follow-ups after your visit        Who to contact     If you have questions or need follow up information about today's clinic visit or your schedule please contact [facility] directly at [phone].  Normal or non-critical lab and imaging results will be communicated to you by [employer], letter or phone within 4 business days after the clinic has received the results. If you do not hear from us within 7 days, please contact the clinic through [employer] or phone. If you have a critical or abnormal lab result, we will notify you by phone as soon as possible.  Submit refill requests through [employer] or call your pharmacy and they will forward the refill request to us. Please allow 3 business days for your refill to be completed.          Additional Information About Your Visit        MyChart Information     [employer] lets you send messages to your doctor, view your test results, renew your prescriptions, schedule appointments and more. To sign up, go to [email or web address] . Click on \"Log in\" on the left side of the screen, which will take you to the Welcome page. Then click on \"Sign up Now\" on the right side of the page.     You will be asked to enter the access code listed below, as well as some personal information. Please follow the directions to create your username and password.     Your access code is: [email or web address]  Expires: 2017 10:12 AM     Your access code will  in 90 days. If you need help or a new code, please call your [facility] or [phone].        Care EveryWhere ID     This is your Care EveryWhere " ID. This could be used by other organizations to access your [place] medical records  [id number]        Your Vitals Were     Last Period                   06/11/2016            Blood Pressure from Last 3 Encounters:   05/15/17 108/64   03/30/17 126/79   12/06/16 123/70    Weight from Last 3 Encounters:   05/15/17 56.7 kg (125 lb)   03/30/17 55.3 kg (122 lb)   12/06/16 60.6 kg (133 lb 8 oz)              Today, you had the following     No orders found for display       Primary Care Provider Office Phone # Fax #    Brooklynn Leyva [phone]       [facility] [address] [facility] [address]        Thank you!     Thank you for choosing [facility]  for your care. Our goal is always to provide you with excellent care. Hearing back from our patients is one way we can continue to improve our services. Please take a few minutes to complete the written survey that you may receive in the mail after your visit with us. Thank you!             Your Updated Medication List - Protect others around you: Learn how to safely use, store and throw away your medicines at www.disposemymeds.org.          This list is accurate as of: 6/15/17 12:28 PM.  Always use your most recent med list.                   Brand Name Dispense Instructions for use    escitalopram 20 MG tablet    LEXAPRO    90 tablet    Take 1 tablet (20 mg) by mouth daily       ibuprofen 600 MG tablet    ADVIL/MOTRIN    30 tablet    Take 1 tablet (600 mg) by mouth every 6 hours as needed for pain (mild)       MULTIVITAMIN ADULT PO          VITAMIN D (CHOLECALCIFEROL) PO      Take by mouth daily
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
## 2017-06-16 ASSESSMENT — ANXIETY QUESTIONNAIRES
7. FEELING AFRAID AS IF SOMETHING AWFUL MIGHT HAPPEN: SEVERAL DAYS
GAD7 TOTAL SCORE: 9
6. BECOMING EASILY ANNOYED OR IRRITABLE: SEVERAL DAYS
3. WORRYING TOO MUCH ABOUT DIFFERENT THINGS: MORE THAN HALF THE DAYS
1. FEELING NERVOUS, ANXIOUS, OR ON EDGE: SEVERAL DAYS
2. NOT BEING ABLE TO STOP OR CONTROL WORRYING: MORE THAN HALF THE DAYS
IF YOU CHECKED OFF ANY PROBLEMS ON THIS QUESTIONNAIRE, HOW DIFFICULT HAVE THESE PROBLEMS MADE IT FOR YOU TO DO YOUR WORK, TAKE CARE OF THINGS AT HOME, OR GET ALONG WITH OTHER PEOPLE: SOMEWHAT DIFFICULT
5. BEING SO RESTLESS THAT IT IS HARD TO SIT STILL: SEVERAL DAYS

## 2017-06-16 ASSESSMENT — PATIENT HEALTH QUESTIONNAIRE - PHQ9: 5. POOR APPETITE OR OVEREATING: SEVERAL DAYS

## 2017-06-17 ASSESSMENT — ANXIETY QUESTIONNAIRES: GAD7 TOTAL SCORE: 9

## 2017-06-17 ASSESSMENT — PATIENT HEALTH QUESTIONNAIRE - PHQ9: SUM OF ALL RESPONSES TO PHQ QUESTIONS 1-9: 10

## 2017-06-23 ENCOUNTER — OFFICE VISIT (OUTPATIENT)
Dept: BEHAVIORAL HEALTH | Facility: CLINIC | Age: 52
End: 2017-06-23
Payer: COMMERCIAL

## 2017-06-23 DIAGNOSIS — F41.1 GAD (GENERALIZED ANXIETY DISORDER): Primary | ICD-10-CM

## 2017-06-23 PROCEDURE — 90837 PSYTX W PT 60 MINUTES: CPT | Performed by: SOCIAL WORKER

## 2017-06-23 NOTE — MR AVS SNAPSHOT
"              After Visit Summary   6/23/2017    Aliyah Askew    MRN: [id number]           Patient Information     Date Of Birth          1965        Visit Information        Provider Department      6/23/2017 11:00 AM Henry Edwards, [facility]        Today's Diagnoses     WALLACE (generalized anxiety disorder)    -  1       Follow-ups after your visit        Your next 10 appointments already scheduled     Jun 30, 2017 10:30 AM CDT   [facility] with KAYLEE Rodriguez   [facility]    [address] [place] [id number] [phone]              Who to contact     If you have questions or need follow up information about today's clinic visit or your schedule please contact [facility] directly at [phone].  Normal or non-critical lab and imaging results will be communicated to you by MyChart, letter or phone within 4 business days after the clinic has received the results. If you do not hear from us within 7 days, please contact the clinic through MyChart or phone. If you have a critical or abnormal lab result, we will notify you by phone as soon as possible.  Submit refill requests through [employer] or call your pharmacy and they will forward the refill request to us. Please allow 3 business days for your refill to be completed.          Additional Information About Your Visit        MyChart Information     [employer] lets you send messages to your doctor, view your test results, renew your prescriptions, schedule appointments and more. To sign up, go to [email or web address] . Click on \"Log in\" on the left side of the screen, which will take you to the Welcome page. Then click on \"Sign up Now\" on the right side of the page.     You will be asked to enter the access code listed below, as well as some personal information. Please follow the directions to create your username and password.     Your access code " is: [email or web address]  Expires: 2017 10:12 AM     Your access code will  in 90 days. If you need help or a new code, please call your [facility] clinic or [phone].        Care EveryWhere ID     This is your Care EveryWhere ID. This could be used by other organizations to access your [facility] medical records  [id number]        Your Vitals Were     Last Period                   2016            Blood Pressure from Last 3 Encounters:   05/15/17 108/64   17 126/79   16 123/70    Weight from Last 3 Encounters:   05/15/17 56.7 kg (125 lb)   17 55.3 kg (122 lb)   16 60.6 kg (133 lb 8 oz)              Today, you had the following     No orders found for display       Primary Care Provider Office Phone # Fax #    Brooklynn Leyva [phone]       [facility] [address] [facility] [address]        Equal Access to Services     [facility]: Hadii aad ku hadasho Soomaali, waaxda luqadaha, qaybta kaalmada adeegyada, waxay patricioin haykevin kahn . So [facility] [phone].    ATENCIÓN: Si habla español, tiene a orona disposición servicios gratuitos de asistencia lingüística. Llame al [phone].    We comply with applicable federal civil rights laws and [place] laws. We do not discriminate on the basis of race, color, national origin, age, disability sex, sexual orientation or gender identity.            Thank you!     Thank you for choosing [facility]  for your care. Our goal is always to provide you with excellent care. Hearing back from our patients is one way we can continue to improve our services. Please take a few minutes to complete the written survey that you may receive in the mail after your visit with us. Thank you!             Your Updated Medication List - Protect others around you: Learn how to safely use, store and throw away your medicines at www.disposemymeds.org.          This list is accurate as of: 17  3:10 PM.   Always use your most recent med list.                   Brand Name Dispense Instructions for use Diagnosis    escitalopram 20 MG tablet    LEXAPRO    90 tablet    Take 1 tablet (20 mg) by mouth daily    Major depressive disorder, single episode, in full remission (H)       ibuprofen 600 MG tablet    ADVIL/MOTRIN    30 tablet    Take 1 tablet (600 mg) by mouth every 6 hours as needed for pain (mild)    Post-op pain       MULTIVITAMIN ADULT PO           VITAMIN D (CHOLECALCIFEROL) PO      Take by mouth daily
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
## 2017-06-27 ASSESSMENT — ANXIETY QUESTIONNAIRES
6. BECOMING EASILY ANNOYED OR IRRITABLE: SEVERAL DAYS
1. FEELING NERVOUS, ANXIOUS, OR ON EDGE: SEVERAL DAYS
IF YOU CHECKED OFF ANY PROBLEMS ON THIS QUESTIONNAIRE, HOW DIFFICULT HAVE THESE PROBLEMS MADE IT FOR YOU TO DO YOUR WORK, TAKE CARE OF THINGS AT HOME, OR GET ALONG WITH OTHER PEOPLE: SOMEWHAT DIFFICULT
GAD7 TOTAL SCORE: 9
3. WORRYING TOO MUCH ABOUT DIFFERENT THINGS: SEVERAL DAYS
5. BEING SO RESTLESS THAT IT IS HARD TO SIT STILL: SEVERAL DAYS
7. FEELING AFRAID AS IF SOMETHING AWFUL MIGHT HAPPEN: MORE THAN HALF THE DAYS
2. NOT BEING ABLE TO STOP OR CONTROL WORRYING: MORE THAN HALF THE DAYS

## 2017-06-27 ASSESSMENT — PATIENT HEALTH QUESTIONNAIRE - PHQ9: 5. POOR APPETITE OR OVEREATING: SEVERAL DAYS

## 2017-06-28 ASSESSMENT — PATIENT HEALTH QUESTIONNAIRE - PHQ9: SUM OF ALL RESPONSES TO PHQ QUESTIONS 1-9: 13

## 2017-06-28 ASSESSMENT — ANXIETY QUESTIONNAIRES: GAD7 TOTAL SCORE: 9

## 2017-06-30 ENCOUNTER — OFFICE VISIT (OUTPATIENT)
Dept: BEHAVIORAL HEALTH | Facility: CLINIC | Age: 52
End: 2017-06-30
Payer: COMMERCIAL

## 2017-06-30 DIAGNOSIS — F41.1 GAD (GENERALIZED ANXIETY DISORDER): Primary | ICD-10-CM

## 2017-06-30 PROCEDURE — 90834 PSYTX W PT 45 MINUTES: CPT | Performed by: SOCIAL WORKER

## 2017-06-30 NOTE — MR AVS SNAPSHOT
"              After Visit Summary   2017    Aliyah Askew    MRN: [id number]           Patient Information     Date Of Birth          1965        Visit Information        Provider Department      2017 10:30 AM [facility]        Today's Diagnoses     WALLACE (generalized anxiety disorder)    -  1       Follow-ups after your visit        Who to contact     If you have questions or need follow up information about today's clinic visit or your schedule please contact [facility] directly at [phone].  Normal or non-critical lab and imaging results will be communicated to you by SNAPP'hart, letter or phone within 4 business days after the clinic has received the results. If you do not hear from us within 7 days, please contact the clinic through SNAPP'hart or phone. If you have a critical or abnormal lab result, we will notify you by phone as soon as possible.  Submit refill requests through [facility] or call your pharmacy and they will forward the refill request to us. Please allow 3 business days for your refill to be completed.          Additional Information About Your Visit        MyChart Information     [facility] lets you send messages to your doctor, view your test results, renew your prescriptions, schedule appointments and more. To sign up, go to [email or web address] . Click on \"Log in\" on the left side of the screen, which will take you to the Welcome page. Then click on \"Sign up Now\" on the right side of the page.     You will be asked to enter the access code listed below, as well as some personal information. Please follow the directions to create your username and password.     Your access code is: [email or web address]  Expires: 2017  2:44 PM     Your access code will  in 90 days. If you need help or a new code, please call your [facility] or [phone].        Care EveryWhere ID     This is your Care EveryWhere ID. This could be used " by other organizations to access your [place] medical records  [id number]        Your Vitals Were     Last Period                   06/11/2016            Blood Pressure from Last 3 Encounters:   05/15/17 108/64   03/30/17 126/79   12/06/16 123/70    Weight from Last 3 Encounters:   05/15/17 56.7 kg (125 lb)   03/30/17 55.3 kg (122 lb)   12/06/16 60.6 kg (133 lb 8 oz)              Today, you had the following     No orders found for display       Primary Care Provider Office Phone # Fax #    Brooklynn Leyva [phone]       [facility] [address]  [facility] [address]        Equal Access to Services     KRYSTAL RENDON : Hadii mat lua hadasho Somanda, waaxda luqadaha, qaybta kaalmada adeegyada, ania kahn . So [facility] [phone].    ATENCIÓN: Si habla español, tiene a orona disposición servicios gratuitos de asistencia lingüística. Llame al [phone].    We comply with applicable federal civil rights laws and [place] laws. We do not discriminate on the basis of race, color, national origin, age, disability sex, sexual orientation or gender identity.            Thank you!     Thank you for choosing [facility]  for your care. Our goal is always to provide you with excellent care. Hearing back from our patients is one way we can continue to improve our services. Please take a few minutes to complete the written survey that you may receive in the mail after your visit with us. Thank you!             Your Updated Medication List - Protect others around you: Learn how to safely use, store and throw away your medicines at www.disposemymeds.org.          This list is accurate as of: 6/30/17  2:44 PM.  Always use your most recent med list.                   Brand Name Dispense Instructions for use Diagnosis    escitalopram 20 MG tablet    LEXAPRO    90 tablet    Take 1 tablet (20 mg) by mouth daily    Major depressive disorder, single episode, in full  remission (H)       ibuprofen 600 MG tablet    ADVIL/MOTRIN    30 tablet    Take 1 tablet (600 mg) by mouth every 6 hours as needed for pain (mild)    Post-op pain       MULTIVITAMIN ADULT PO           VITAMIN D (CHOLECALCIFEROL) PO      Take by mouth daily
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
## 2017-07-03 ASSESSMENT — PATIENT HEALTH QUESTIONNAIRE - PHQ9: 5. POOR APPETITE OR OVEREATING: SEVERAL DAYS

## 2017-07-03 ASSESSMENT — ANXIETY QUESTIONNAIRES
IF YOU CHECKED OFF ANY PROBLEMS ON THIS QUESTIONNAIRE, HOW DIFFICULT HAVE THESE PROBLEMS MADE IT FOR YOU TO DO YOUR WORK, TAKE CARE OF THINGS AT HOME, OR GET ALONG WITH OTHER PEOPLE: VERY DIFFICULT
6. BECOMING EASILY ANNOYED OR IRRITABLE: SEVERAL DAYS
5. BEING SO RESTLESS THAT IT IS HARD TO SIT STILL: SEVERAL DAYS
2. NOT BEING ABLE TO STOP OR CONTROL WORRYING: MORE THAN HALF THE DAYS
GAD7 TOTAL SCORE: 9
1. FEELING NERVOUS, ANXIOUS, OR ON EDGE: SEVERAL DAYS
7. FEELING AFRAID AS IF SOMETHING AWFUL MIGHT HAPPEN: SEVERAL DAYS
3. WORRYING TOO MUCH ABOUT DIFFERENT THINGS: MORE THAN HALF THE DAYS

## 2017-07-04 ASSESSMENT — PATIENT HEALTH QUESTIONNAIRE - PHQ9: SUM OF ALL RESPONSES TO PHQ QUESTIONS 1-9: 12

## 2017-07-04 ASSESSMENT — ANXIETY QUESTIONNAIRES: GAD7 TOTAL SCORE: 9

## 2017-07-14 ENCOUNTER — OFFICE VISIT (OUTPATIENT)
Dept: BEHAVIORAL HEALTH | Facility: CLINIC | Age: 52
End: 2017-07-14
Payer: COMMERCIAL

## 2017-07-14 DIAGNOSIS — F41.1 GAD (GENERALIZED ANXIETY DISORDER): Primary | ICD-10-CM

## 2017-07-14 PROCEDURE — 90837 PSYTX W PT 60 MINUTES: CPT | Performed by: SOCIAL WORKER

## 2017-07-14 NOTE — MR AVS SNAPSHOT
"              After Visit Summary   2017    Aliyah Askew    MRN: [id number]           Patient Information     Date Of Birth          1965        Visit Information        Provider Department      2017 2:00 PM Henry Edwards, [facility]        Today's Diagnoses     WALLACE (generalized anxiety disorder)    -  1       Follow-ups after your visit        Who to contact     If you have questions or need follow up information about today's clinic visit or your schedule please contact [facility] directly at [phone].  Normal or non-critical lab and imaging results will be communicated to you by Alkermeshart, letter or phone within 4 business days after the clinic has received the results. If you do not hear from us within 7 days, please contact the clinic through Alkermeshart or phone. If you have a critical or abnormal lab result, we will notify you by phone as soon as possible.  Submit refill requests through Money Toolkit or call your pharmacy and they will forward the refill request to us. Please allow 3 business days for your refill to be completed.          Additional Information About Your Visit        MyChart Information     [employer] lets you send messages to your doctor, view your test results, renew your prescriptions, schedule appointments and more. To sign up, go to [email or web address] . Click on \"Log in\" on the left side of the screen, which will take you to the Welcome page. Then click on \"Sign up Now\" on the right side of the page.     You will be asked to enter the access code listed below, as well as some personal information. Please follow the directions to create your username and password.     Your access code is: [email or web address]  Expires: 2017  2:44 PM     Your access code will  in 90 days. If you need help or a new code, please call your [facility] or [phone].        Care EveryWhere ID     This is your Care EveryWhere ID. This could be used " by other organizations to access your [facility] medical records  [id number]        Your Vitals Were     Last Period                   06/11/2016            Blood Pressure from Last 3 Encounters:   05/15/17 108/64   03/30/17 126/79   12/06/16 123/70    Weight from Last 3 Encounters:   05/15/17 56.7 kg (125 lb)   03/30/17 55.3 kg (122 lb)   12/06/16 60.6 kg (133 lb 8 oz)              Today, you had the following     No orders found for display       Primary Care Provider Office Phone # Fax #    Brooklynn Leyva [phone]       [facility] [address]  [facility] [address]        Equal Access to Services     KRYSTAL RENDON : Hadii mat lua hadasho Somanda, waaxda luqadaha, qaybta kaalmada adeegyada, ania kahn . So [facility] [phone].    ATENCIÓN: Si habla español, tiene a orona disposición servicios gratuitos de asistencia lingüística. Llame al [phone].    We comply with applicable federal civil rights laws and [place] laws. We do not discriminate on the basis of race, color, national origin, age, disability sex, sexual orientation or gender identity.            Thank you!     Thank you for choosing [facility]  for your care. Our goal is always to provide you with excellent care. Hearing back from our patients is one way we can continue to improve our services. Please take a few minutes to complete the written survey that you may receive in the mail after your visit with us. Thank you!             Your Updated Medication List - Protect others around you: Learn how to safely use, store and throw away your medicines at www.disposemymeds.org.          This list is accurate as of: 7/14/17  3:33 PM.  Always use your most recent med list.                   Brand Name Dispense Instructions for use Diagnosis    escitalopram 20 MG tablet    LEXAPRO    90 tablet    Take 1 tablet (20 mg) by mouth daily    Major depressive disorder, single episode, in full  remission (H)       ibuprofen 600 MG tablet    ADVIL/MOTRIN    30 tablet    Take 1 tablet (600 mg) by mouth every 6 hours as needed for pain (mild)    Post-op pain       MULTIVITAMIN ADULT PO           VITAMIN D (CHOLECALCIFEROL) PO      Take by mouth daily
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
## 2017-07-25 ENCOUNTER — OFFICE VISIT (OUTPATIENT)
Dept: BEHAVIORAL HEALTH | Facility: CLINIC | Age: 52
End: 2017-07-25
Payer: COMMERCIAL

## 2017-07-25 DIAGNOSIS — F43.23 ADJUSTMENT DISORDER WITH MIXED ANXIETY AND DEPRESSED MOOD: Primary | ICD-10-CM

## 2017-07-25 PROCEDURE — 90837 PSYTX W PT 60 MINUTES: CPT | Performed by: SOCIAL WORKER

## 2017-07-25 ASSESSMENT — ANXIETY QUESTIONNAIRES
2. NOT BEING ABLE TO STOP OR CONTROL WORRYING: MORE THAN HALF THE DAYS
6. BECOMING EASILY ANNOYED OR IRRITABLE: MORE THAN HALF THE DAYS
GAD7 TOTAL SCORE: 12
1. FEELING NERVOUS, ANXIOUS, OR ON EDGE: MORE THAN HALF THE DAYS
5. BEING SO RESTLESS THAT IT IS HARD TO SIT STILL: NOT AT ALL
3. WORRYING TOO MUCH ABOUT DIFFERENT THINGS: MORE THAN HALF THE DAYS
IF YOU CHECKED OFF ANY PROBLEMS ON THIS QUESTIONNAIRE, HOW DIFFICULT HAVE THESE PROBLEMS MADE IT FOR YOU TO DO YOUR WORK, TAKE CARE OF THINGS AT HOME, OR GET ALONG WITH OTHER PEOPLE: SOMEWHAT DIFFICULT
7. FEELING AFRAID AS IF SOMETHING AWFUL MIGHT HAPPEN: MORE THAN HALF THE DAYS

## 2017-07-25 ASSESSMENT — PATIENT HEALTH QUESTIONNAIRE - PHQ9: 5. POOR APPETITE OR OVEREATING: MORE THAN HALF THE DAYS
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
## 2017-07-25 NOTE — MR AVS SNAPSHOT
"              After Visit Summary   2017    Aliyah Askew    MRN: [id number]           Patient Information     Date Of Birth          1965        Visit Information        Provider Department      2017 9:00 AM Henry Edwards, [facility]        Today's Diagnoses     Adjustment disorder with mixed anxiety and depressed mood    -  1       Follow-ups after your visit        Who to contact     If you have questions or need follow up information about today's clinic visit or your schedule please contact [facility] directly at [phone].  Normal or non-critical lab and imaging results will be communicated to you by [employer], letter or phone within 4 business days after the clinic has received the results. If you do not hear from us within 7 days, please contact the clinic through [employer] or phone. If you have a critical or abnormal lab result, we will notify you by phone as soon as possible.  Submit refill requests through [employer] or call your pharmacy and they will forward the refill request to us. Please allow 3 business days for your refill to be completed.          Additional Information About Your Visit        MyChart Information     [employer] lets you send messages to your doctor, view your test results, renew your prescriptions, schedule appointments and more. To sign up, go to [email or web address] . Click on \"Log in\" on the left side of the screen, which will take you to the Welcome page. Then click on \"Sign up Now\" on the right side of the page.     You will be asked to enter the access code listed below, as well as some personal information. Please follow the directions to create your username and password.     Your access code is: [email or web address]  Expires: 2017  2:44 PM     Your access code will  in 90 days. If you need help or a new code, please call your [facility] or [phone].        Care EveryWhere ID     This is your Care EveryWhere " ID. This could be used by other organizations to access your [facility] medical records  [id number]        Your Vitals Were     Last Period                   06/11/2016            Blood Pressure from Last 3 Encounters:   05/15/17 108/64   03/30/17 126/79   12/06/16 123/70    Weight from Last 3 Encounters:   05/15/17 56.7 kg (125 lb)   03/30/17 55.3 kg (122 lb)   12/06/16 60.6 kg (133 lb 8 oz)              Today, you had the following     No orders found for display       Primary Care Provider Office Phone # Fax #    Brooklynn Leyva [phone]       [facility] [address]  [facility] [address]        Equal Access to Services     KRYSTAL RENDON : Hadii aad ku hadasho Soomaali, waaxda luqadaha, qaybta kaalmada adeegyada, waxrod kahn . So [facility] [phone].    ATENCIÓN: Si habla español, tiene a orona disposición servicios gratuitos de asistencia lingüística. Llame al [phone].    We comply with applicable federal civil rights laws and [place] laws. We do not discriminate on the basis of race, color, national origin, age, disability sex, sexual orientation or gender identity.            Thank you!     Thank you for choosing [facility]  for your care. Our goal is always to provide you with excellent care. Hearing back from our patients is one way we can continue to improve our services. Please take a few minutes to complete the written survey that you may receive in the mail after your visit with us. Thank you!             Your Updated Medication List - Protect others around you: Learn how to safely use, store and throw away your medicines at www.disposemymeds.org.          This list is accurate as of: 7/25/17 10:51 AM.  Always use your most recent med list.                   Brand Name Dispense Instructions for use Diagnosis    escitalopram 20 MG tablet    LEXAPRO    90 tablet    Take 1 tablet (20 mg) by mouth daily    Major depressive disorder,  single episode, in full remission (H)       ibuprofen 600 MG tablet    ADVIL/MOTRIN    30 tablet    Take 1 tablet (600 mg) by mouth every 6 hours as needed for pain (mild)    Post-op pain       MULTIVITAMIN ADULT PO           VITAMIN D (CHOLECALCIFEROL) PO      Take by mouth daily

## 2017-07-26 ASSESSMENT — PATIENT HEALTH QUESTIONNAIRE - PHQ9: SUM OF ALL RESPONSES TO PHQ QUESTIONS 1-9: 15

## 2017-07-26 ASSESSMENT — ANXIETY QUESTIONNAIRES: GAD7 TOTAL SCORE: 12

## 2017-08-14 ENCOUNTER — OFFICE VISIT (OUTPATIENT)
Dept: BEHAVIORAL HEALTH | Facility: CLINIC | Age: 52
End: 2017-08-14
Payer: COMMERCIAL

## 2017-08-14 DIAGNOSIS — F41.1 GAD (GENERALIZED ANXIETY DISORDER): Primary | ICD-10-CM

## 2017-08-14 PROCEDURE — 90834 PSYTX W PT 45 MINUTES: CPT | Performed by: SOCIAL WORKER
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
## 2017-08-14 NOTE — MR AVS SNAPSHOT
"              After Visit Summary   8/14/2017    Aliyah Askew    MRN: [id number]           Patient Information     Date Of Birth          1965        Visit Information        Provider Department      8/14/2017 5:00 PM Henry Edwards, [facility]        Today's Diagnoses     WALLACE (generalized anxiety disorder)    -  1       Follow-ups after your visit        Your next 10 appointments already scheduled     Aug 18, 2017  4:00 PM CDT   [facility]    [address] [place] [id number] [phone]              Who to contact     If you have questions or need follow up information about today's clinic visit or your schedule please contact [facility] directly at [phone].  Normal or non-critical lab and imaging results will be communicated to you by MyChart, letter or phone within 4 business days after the clinic has received the results. If you do not hear from us within 7 days, please contact the clinic through MyChart or phone. If you have a critical or abnormal lab result, we will notify you by phone as soon as possible.  Submit refill requests through [employer] or call your pharmacy and they will forward the refill request to us. Please allow 3 business days for your refill to be completed.          Additional Information About Your Visit        MyChart Information     [employer] lets you send messages to your doctor, view your test results, renew your prescriptions, schedule appointments and more. To sign up, go to [email or web address] . Click on \"Log in\" on the left side of the screen, which will take you to the Welcome page. Then click on \"Sign up Now\" on the right side of the page.     You will be asked to enter the access code listed below, as well as some personal information. Please follow the directions to create your username and password.     Your access code " is: [email or web address]  Expires: 2017  2:44 PM     Your access code will  in 90 days. If you need help or a new code, please call your [facility] clinic or [phone].        Care EveryWhere ID     This is your Care EveryWhere ID. This could be used by other organizations to access your [facility] medical records  [id number]        Your Vitals Were     Last Period                   2016            Blood Pressure from Last 3 Encounters:   05/15/17 108/64   17 126/79   16 123/70    Weight from Last 3 Encounters:   05/15/17 56.7 kg (125 lb)   17 55.3 kg (122 lb)   16 60.6 kg (133 lb 8 oz)              Today, you had the following     No orders found for display       Primary Care Provider Office Phone # Fax #    Brooklynn Leyva [phone]       [address]  [facility] [address]        Equal Access to Services     RHIANNON [facility] AH: Hadii aad ku hadasho Soomaali, waaxda luqadaha, qaybta kaalmada adeegyada, waxay idiin hayalinen taisha kanh . So [facility] [phone].    ATENCIÓN: Si habla español, tiene a orona disposición servicios gratuitos de asistencia lingüística. LlOur [facility] of [facility] [phone].    We comply with applicable federal civil rights laws and [place] laws. We do not discriminate on the basis of race, color, national origin, age, disability sex, sexual orientation or gender identity.            Thank you!     Thank you for choosing [facility]  for your care. Our goal is always to provide you with excellent care. Hearing back from our patients is one way we can continue to improve our services. Please take a few minutes to complete the written survey that you may receive in the mail after your visit with us. Thank you!             Your Updated Medication List - Protect others around you: Learn how to safely use, store and throw away your medicines at www.disposemymeds.org.          This list is accurate as of: 17  6:00 PM.  Always use your  most recent med list.                   Brand Name Dispense Instructions for use Diagnosis    escitalopram 20 MG tablet    LEXAPRO    90 tablet    Take 1 tablet (20 mg) by mouth daily    Major depressive disorder, single episode, in full remission (H)       ibuprofen 600 MG tablet    ADVIL/MOTRIN    30 tablet    Take 1 tablet (600 mg) by mouth every 6 hours as needed for pain (mild)    Post-op pain       MULTIVITAMIN ADULT PO           VITAMIN D (CHOLECALCIFEROL) PO      Take by mouth daily

## 2017-08-18 ENCOUNTER — OFFICE VISIT (OUTPATIENT)
Dept: BEHAVIORAL HEALTH | Facility: CLINIC | Age: 52
End: 2017-08-18
Payer: COMMERCIAL

## 2017-08-18 DIAGNOSIS — F41.1 GAD (GENERALIZED ANXIETY DISORDER): Primary | ICD-10-CM

## 2017-08-18 PROCEDURE — 90837 PSYTX W PT 60 MINUTES: CPT | Performed by: SOCIAL WORKER

## 2017-08-18 NOTE — MR AVS SNAPSHOT
After Visit Summary   8/18/2017    Aliyah Askew    MRN: [id number]           Patient Information     Date Of Birth          1965        Visit Information        Provider Department      8/18/2017 4:00 PM [facility]        Today's Diagnoses     WALLACE (generalized anxiety disorder)    -  1       Follow-ups after your visit        Your next 10 appointments already scheduled     Aug 23, 2017 11:00 AM CDT   (Arrive by 10:45 AM)   Hearing Evaluation with Merari Ventura [facility] ([facility])    [address] [place] [id number] [phone]           Please see your medical professional for ear cleaning prior to this appointment if you believe wax buildup may be an issue. All patients are required to have a physician's order stating the medical reason for the hearing test. Your doctor can send an electronic order, use their own form or we have provided a form (called Physician's Order for Audiology Services). It states that there is a medical reason for your exam. Without an order you may need to be rescheduled until the order can be obtained.            Aug 25, 2017 11:30 AM CDT   [facility]    [address] [place] [address] [phone]            Sep 01, 2017 11:00 AM CDT   [facility] Visit with KAYLEE Rodriguez   [facility]    [address] [place] [address] [phone]              Who to contact     If you have questions or need follow up information about today's clinic visit or your schedule please contact [facility] directly at [phone].  Normal or non-critical lab and imaging results will be communicated to you by MyChart, letter or phone within 4 business days after the clinic has received the  results. If you do not hear from us within 7 days, please contact the clinic through [employer] or phone. If you have a critical or abnormal lab result, we will notify you by phone as soon as possible.  Submit refill requests through [employer] or call your pharmacy and they will forward the refill request to us. Please allow 3 business days for your refill to be completed.          Additional Information About Your Visit        [employer] Information     [employer] gives you secure access to your electronic health record. If you see a primary care provider, you can also send messages to your care team and make appointments. If you have questions, please call your primary care clinic.  If you do not have a primary care provider, please call [phone] and they will assist you.        Care EveryWhere ID     This is your Care EveryWhere ID. This could be used by other organizations to access your [facility] medical records  [id number]        Your Vitals Were     Last Period                   06/11/2016            Blood Pressure from Last 3 Encounters:   05/15/17 108/64   03/30/17 126/79   12/06/16 123/70    Weight from Last 3 Encounters:   05/15/17 56.7 kg (125 lb)   03/30/17 55.3 kg (122 lb)   12/06/16 60.6 kg (133 lb 8 oz)              Today, you had the following     No orders found for display       Primary Care Provider Office Phone # Fax #    Brooklynn Leyva [phone]       [address]  [facility] [address]        Equal Access to Services     RHIANNON [facility] : Hadii mat Mae, waaxda lujcaiel, qaybta kaalmada carlos, ania brown. So [facility] [phone].    ATENCIÓN: Si habla español, tiene a roona disposición servicios gratuitos de asistencia lingüística. Llame al [phone].    We comply with applicable federal civil rights laws and [place] laws. We do not discriminate on the basis of race, color, national origin, age, disability sex, sexual orientation or  gender identity.            Thank you!     Thank you for choosing [facility]  for your care. Our goal is always to provide you with excellent care. Hearing back from our patients is one way we can continue to improve our services. Please take a few minutes to complete the written survey that you may receive in the mail after your visit with us. Thank you!             Your Updated Medication List - Protect others around you: Learn how to safely use, store and throw away your medicines at www.disposemymeds.org.          This list is accurate as of: 8/18/17 11:59 PM.  Always use your most recent med list.                   Brand Name Dispense Instructions for use Diagnosis    escitalopram 20 MG tablet    LEXAPRO    90 tablet    Take 1 tablet (20 mg) by mouth daily    Major depressive disorder, single episode, in full remission (H)       ibuprofen 600 MG tablet    ADVIL/MOTRIN    30 tablet    Take 1 tablet (600 mg) by mouth every 6 hours as needed for pain (mild)    Post-op pain       MULTIVITAMIN ADULT PO           VITAMIN D (CHOLECALCIFEROL) PO      Take by mouth daily

## 2017-08-21 ASSESSMENT — ANXIETY QUESTIONNAIRES
IF YOU CHECKED OFF ANY PROBLEMS ON THIS QUESTIONNAIRE, HOW DIFFICULT HAVE THESE PROBLEMS MADE IT FOR YOU TO DO YOUR WORK, TAKE CARE OF THINGS AT HOME, OR GET ALONG WITH OTHER PEOPLE: VERY DIFFICULT
6. BECOMING EASILY ANNOYED OR IRRITABLE: SEVERAL DAYS
2. NOT BEING ABLE TO STOP OR CONTROL WORRYING: MORE THAN HALF THE DAYS
7. FEELING AFRAID AS IF SOMETHING AWFUL MIGHT HAPPEN: MORE THAN HALF THE DAYS
5. BEING SO RESTLESS THAT IT IS HARD TO SIT STILL: SEVERAL DAYS
3. WORRYING TOO MUCH ABOUT DIFFERENT THINGS: MORE THAN HALF THE DAYS
GAD7 TOTAL SCORE: 12
1. FEELING NERVOUS, ANXIOUS, OR ON EDGE: MORE THAN HALF THE DAYS

## 2017-08-21 ASSESSMENT — PATIENT HEALTH QUESTIONNAIRE - PHQ9
5. POOR APPETITE OR OVEREATING: MORE THAN HALF THE DAYS
SUM OF ALL RESPONSES TO PHQ QUESTIONS 1-9: 16

## 2017-08-22 ASSESSMENT — ANXIETY QUESTIONNAIRES: GAD7 TOTAL SCORE: 12
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
## 2017-08-23 ENCOUNTER — OFFICE VISIT (OUTPATIENT)
Dept: AUDIOLOGY | Facility: CLINIC | Age: 52
End: 2017-08-23

## 2017-08-23 DIAGNOSIS — H93.233 HYPERACUSIS OF BOTH EARS: Primary | ICD-10-CM

## 2017-08-23 NOTE — MR AVS SNAPSHOT
After Visit Summary   8/23/2017    Aliyah Askew    MRN: [id number]           Patient Information     Date Of Birth          1965        Visit Information        Provider Department      8/23/2017 11:00 AM Merari Ventura [facility] Audiology        Today's Diagnoses     Hyperacusis of both ears    -  1       Follow-ups after your visit        Your next 10 appointments already scheduled     Aug 25, 2017 11:30 AM CDT   Return Visit with Henry Edwards [facility] ([facility])    [address] [place] [id number] [phone]            Sep 01, 2017 11:00 AM CDT   [facility]    [address] [place] [id number] [phone]              Who to contact     Please call your clinic at [phone] to:    Ask questions about your health    Make or cancel appointments    Discuss your medicines    Learn about your test results    Speak to your doctor   If you have compliments or concerns about an experience at your clinic, or if you wish to file a complaint, please contact [facility] Patient Relations at [phone] or email us at [email or web address]         Additional Information About Your Visit        MyChart Information     [employer] gives you secure access to your electronic health record. If you see a primary care provider, you can also send messages to your care team and make appointments. If you have questions, please call your primary care clinic.  If you do not have a primary care provider, please call [phone] and they will assist you.      [employer] is an electronic gateway that provides easy, online access to your medical records. With [employer], you can request a clinic appointment, read your test results, renew a prescription or communicate with your care team.     To access your  existing account, please contact your [facility] or call [phone] for assistance.        Care EveryWhere ID     This is your Care EveryWhere ID. This could be used by other organizations to access your [facility] medical records  [id number]        Your Vitals Were     Last Period                   06/11/2016            Blood Pressure from Last 3 Encounters:   05/15/17 108/64   03/30/17 126/79   12/06/16 123/70    Weight from Last 3 Encounters:   05/15/17 56.7 kg (125 lb)   03/30/17 55.3 kg (122 lb)   12/06/16 60.6 kg (133 lb 8 oz)              We Performed the Following     AUDIOGRAM/TYMPANOGRAM - INTERFACE     [facility] Audiometry Thr[facility] Eval & Speech Recog ([id number])     Tymps / Reflex   ([id number])        Primary Care Provider Office Phone # Fax #    Brooklynn Leyva [phone] [address]  [facility] [address]        Equal Access to Services     KRYSTAL RENDON : Hadii aad ku hadasho Soomaali, waaxda luqadaha, qaybta kaalmada adeegyada, waxay idiin hayaan adeeg khararoxy la'kevin . So [facility] [phone].    ATENCIÓN: Si habla español, tiene a orona disposición servicios gratuitos de asistencia lingüística. Llame al [phone].    We comply with applicable federal civil rights laws and [place] laws. We do not discriminate on the basis of race, color, national origin, age, disability sex, sexual orientation or gender identity.            Thank you!     Thank you for choosing [facility] AUDIOLOGY  for your care. Our goal is always to provide you with excellent care. Hearing back from our patients is one way we can continue to improve our services. Please take a few minutes to complete the written survey that you may receive in the mail after your visit with us. Thank you!             Your Updated Medication List - Protect others around you: Learn how to safely use, store and throw away your medicines at www.disposemymeds.org.          This list is accurate as of: 8/23/17   4:20 PM.  Always use your most recent med list.                   Brand Name Dispense Instructions for use Diagnosis    escitalopram 20 MG tablet    LEXAPRO    90 tablet    Take 1 tablet (20 mg) by mouth daily    Major depressive disorder, single episode, in full remission (H)       ibuprofen 600 MG tablet    ADVIL/MOTRIN    30 tablet    Take 1 tablet (600 mg) by mouth every 6 hours as needed for pain (mild)    Post-op pain       MULTIVITAMIN ADULT PO           VITAMIN D (CHOLECALCIFEROL) PO      Take by mouth daily
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
## 2017-08-25 ENCOUNTER — OFFICE VISIT (OUTPATIENT)
Dept: BEHAVIORAL HEALTH | Facility: CLINIC | Age: 52
End: 2017-08-25
Payer: COMMERCIAL

## 2017-08-25 DIAGNOSIS — F41.1 GAD (GENERALIZED ANXIETY DISORDER): Primary | ICD-10-CM

## 2017-08-25 PROCEDURE — 90832 PSYTX W PT 30 MINUTES: CPT | Performed by: SOCIAL WORKER
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
## 2017-08-25 NOTE — MR AVS SNAPSHOT
After Visit Summary   8/25/2017    Aliyah Askew    MRN: [id number]           Patient Information     Date Of Birth          1965        Visit Information        Provider Department      8/25/2017 11:30 AM [facility]        Today's Diagnoses     WALLACE (generalized anxiety disorder)    -  1       Follow-ups after your visit        Additional Services     MENTAL HEALTH REFERRAL       Your provider has referred you to: [facility]: [facility] - Counseling (Individual/Couples/Family) - [facility] [place] [phone]   [email or web address]   *Patient will be contacted by [facility] scheduling partner[facility] to schedule an appointment.  Patients may also call [facility] to schedule.    Pt is seeking an adult  ADHD eval    All scheduling is subject to the client's specific insurance plan & benefits, provider/location availability, and provider clinical specialities.  Please arrive 15 minutes early for your first appointment and bring your completed paperwork.    Please be aware that coverage of these services is subject to the terms and limitations of your health insurance plan.  Call member services at your health plan with any benefit or coverage questions.                  Your next 10 appointments already scheduled     Sep 01, 2017 11:00 AM CDT   [facility]    [address] [place] [id number] [phone]              Who to contact     If you have questions or need follow up information about today's clinic visit or your schedule please contact [facility] directly at [phone].  Normal or non-critical lab and imaging results will be communicated to you by MyChart, letter or phone within 4 business days after the clinic has received the  results. If you do not hear from us within 7 days, please contact the clinic through [employer] or phone. If you have a critical or abnormal lab result, we will notify you by phone as soon as possible.  Submit refill requests through [employer] or call your pharmacy and they will forward the refill request to us. Please allow 3 business days for your refill to be completed.          Additional Information About Your Visit        "[employer] Information     [employer] gives you secure access to your electronic health record. If you see a primary care provider, you can also send messages to your care team and make appointments. If you have questions, please call your primary care clinic.  If you do not have a primary care provider, please call [phone] and they will assist you.        Care EveryWhere ID     This is your Care EveryWhere ID. This could be used by other organizations to access your [place] medical records  [id number]        Your Vitals Were     Last Period                   06/11/2016            Blood Pressure from Last 3 Encounters:   05/15/17 108/64   03/30/17 126/79   12/06/16 123/70    Weight from Last 3 Encounters:   05/15/17 56.7 kg (125 lb)   03/30/17 55.3 kg (122 lb)   12/06/16 60.6 kg (133 lb 8 oz)              We Performed the Following     MENTAL HEALTH REFERRAL        Primary Care Provider Office Phone # Fax #    Brooklynn Leyva [phone]       [address]  [facility] [address]        Equal Access to Services     KRYSTAL RENDON : Hadii aad ku hadasho Soamosali, waaxda luqadaha, qaybta kaalmada adecornelyada, ania brown. So [facility] [phone].    ATENCIÓN: Si habla español, tiene a orona disposición servicios gratuitos de asistencia lingüística. Llame al [phone].    We comply with applicable federal civil rights laws and [place] laws. We do not discriminate on the basis of race, color, national origin, age, disability sex, sexual orientation or  gender identity.            Thank you!     Thank you for choosing [facility]  for your care. Our goal is always to provide you with excellent care. Hearing back from our patients is one way we can continue to improve our services. Please take a few minutes to complete the written survey that you may receive in the mail after your visit with us. Thank you!             Your Updated Medication List - Protect others around you: Learn how to safely use, store and throw away your medicines at www.disposemymeds.org.          This list is accurate as of: 8/25/17  1:58 PM.  Always use your most recent med list.                   Brand Name Dispense Instructions for use Diagnosis    escitalopram 20 MG tablet    LEXAPRO    90 tablet    Take 1 tablet (20 mg) by mouth daily    Major depressive disorder, single episode, in full remission (H)       ibuprofen 600 MG tablet    ADVIL/MOTRIN    30 tablet    Take 1 tablet (600 mg) by mouth every 6 hours as needed for pain (mild)    Post-op pain       MULTIVITAMIN ADULT PO           VITAMIN D (CHOLECALCIFEROL) PO      Take by mouth daily

## 2017-08-28 ASSESSMENT — ANXIETY QUESTIONNAIRES
2. NOT BEING ABLE TO STOP OR CONTROL WORRYING: MORE THAN HALF THE DAYS
5. BEING SO RESTLESS THAT IT IS HARD TO SIT STILL: SEVERAL DAYS
GAD7 TOTAL SCORE: 11
IF YOU CHECKED OFF ANY PROBLEMS ON THIS QUESTIONNAIRE, HOW DIFFICULT HAVE THESE PROBLEMS MADE IT FOR YOU TO DO YOUR WORK, TAKE CARE OF THINGS AT HOME, OR GET ALONG WITH OTHER PEOPLE: SOMEWHAT DIFFICULT
6. BECOMING EASILY ANNOYED OR IRRITABLE: SEVERAL DAYS
1. FEELING NERVOUS, ANXIOUS, OR ON EDGE: MORE THAN HALF THE DAYS
3. WORRYING TOO MUCH ABOUT DIFFERENT THINGS: MORE THAN HALF THE DAYS
7. FEELING AFRAID AS IF SOMETHING AWFUL MIGHT HAPPEN: MORE THAN HALF THE DAYS

## 2017-08-28 ASSESSMENT — PATIENT HEALTH QUESTIONNAIRE - PHQ9
5. POOR APPETITE OR OVEREATING: SEVERAL DAYS
SUM OF ALL RESPONSES TO PHQ QUESTIONS 1-9: 13

## 2017-08-29 ASSESSMENT — ANXIETY QUESTIONNAIRES: GAD7 TOTAL SCORE: 11

## 2017-09-01 ENCOUNTER — OFFICE VISIT (OUTPATIENT)
Dept: BEHAVIORAL HEALTH | Facility: CLINIC | Age: 52
End: 2017-09-01
Payer: COMMERCIAL

## 2017-09-01 DIAGNOSIS — F41.1 GAD (GENERALIZED ANXIETY DISORDER): Primary | ICD-10-CM

## 2017-09-01 DIAGNOSIS — N89.8 VAGINAL DISCHARGE: ICD-10-CM

## 2017-09-01 PROCEDURE — 90837 PSYTX W PT 60 MINUTES: CPT | Performed by: SOCIAL WORKER

## 2017-09-01 NOTE — MR AVS SNAPSHOT
After Visit Summary   9/1/2017    Aliyah Askew    MRN: [id number]           Patient Information     Date Of Birth          1965        Visit Information        Provider Department      9/1/2017 11:00 AM Henry Edwards, [facility]        Today's Diagnoses     WALLACE (generalized anxiety disorder)    -  1       Follow-ups after your visit        Your next 10 appointments already scheduled     Sep 06, 2017 12:30 PM CDT   Return Visit with KAYLEE Rodriguez   [facility] ([facility]    [address] [place] [address] [phone]              Who to contact     If you have questions or need follow up information about today's clinic visit or your schedule please contact [facility] directly at [phone].  Normal or non-critical lab and imaging results will be communicated to you by MyChart, letter or phone within 4 business days after the clinic has received the results. If you do not hear from us within 7 days, please contact the clinic through MyChart or phone. If you have a critical or abnormal lab result, we will notify you by phone as soon as possible.  Submit refill requests through "[employer]" or call your pharmacy and they will forward the refill request to us. Please allow 3 business days for your refill to be completed.          Additional Information About Your Visit        MyChart Information     "[employer]" gives you secure access to your electronic health record. If you see a primary care provider, you can also send messages to your care team and make appointments. If you have questions, please call your primary care clinic.  If you do not have a primary care provider, please call [phone] and they will assist you.        Care EveryWhere ID     This is your Care EveryWhere ID. This could be used by other organizations to access your [place] medical records  [id number]        Your Vitals Were      Last Period                   06/11/2016            Blood Pressure from Last 3 Encounters:   05/15/17 108/64   03/30/17 126/79   12/06/16 123/70    Weight from Last 3 Encounters:   05/15/17 56.7 kg (125 lb)   03/30/17 55.3 kg (122 lb)   12/06/16 60.6 kg (133 lb 8 oz)              Today, you had the following     No orders found for display       Primary Care Provider Office Phone # Fax #    Brooklynn Leyva [phone] [address]  [facility] [address]        Equal Access to Services     [facility] : Hadii mat lua hadefrem Somanda, wavarshada norah, qaybta kaalmada carlos, ania kahn . So [facility] [phone].    ATENCIÓN: Si habla español, tiene a orona disposición servicios gratuitos de asistencia lingüística. [facility] [phone].    We comply with applicable federal civil rights laws and [place] laws. We do not discriminate on the basis of race, color, national origin, age, disability sex, sexual orientation or gender identity.            Thank you!     Thank you for choosing [facility]  for your care. Our goal is always to provide you with excellent care. Hearing back from our patients is one way we can continue to improve our services. Please take a few minutes to complete the written survey that you may receive in the mail after your visit with us. Thank you!             Your Updated Medication List - Protect others around you: Learn how to safely use, store and throw away your medicines at www.disposemymeds.org.          This list is accurate as of: 9/1/17  1:23 PM.  Always use your most recent med list.                   Brand Name Dispense Instructions for use Diagnosis    escitalopram 20 MG tablet    LEXAPRO    90 tablet    Take 1 tablet (20 mg) by mouth daily    Major depressive disorder, single episode, in full remission (H)       ibuprofen 600 MG tablet    ADVIL/MOTRIN    30 tablet    Take 1 tablet (600 mg) by mouth every 6 hours as  needed for pain (mild)    Post-op pain       MULTIVITAMIN ADULT PO           VITAMIN D (CHOLECALCIFEROL) PO      Take by mouth daily
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
## 2017-09-06 RX ORDER — FLUCONAZOLE 150 MG/1
TABLET ORAL
Qty: 1 TABLET | Refills: 0 | Status: SHIPPED | OUTPATIENT
Start: 2017-09-06 | End: 2017-11-07

## 2017-09-06 NOTE — TELEPHONE ENCOUNTER
Pending Prescriptions:                       Disp   Refills    fluconazole (DIFLUCAN) 150 MG tablet [Pha*1 tabl*0            Sig: TAKE ONE TABLET BY MOUTH ONCE FOR 1 DOSE      Last OV was 5/15/17. Per RN refill protocol, refill sent to pharmacy.   Anabel Haywood RN-BSN

## 2017-09-11 ENCOUNTER — OFFICE VISIT (OUTPATIENT)
Dept: BEHAVIORAL HEALTH | Facility: CLINIC | Age: 52
End: 2017-09-11
Payer: COMMERCIAL

## 2017-09-11 DIAGNOSIS — F41.1 GAD (GENERALIZED ANXIETY DISORDER): Primary | ICD-10-CM

## 2017-09-11 PROCEDURE — 90837 PSYTX W PT 60 MINUTES: CPT | Performed by: SOCIAL WORKER

## 2017-09-11 NOTE — MR AVS SNAPSHOT
After Visit Summary   9/11/2017    Aliyah Askew    MRN: [id number]           Patient Information     Date Of Birth          1965        Visit Information        Provider Department      9/11/2017 11:00 AM [facility] Edwards [facility]        Today's Diagnoses     WALLACE (generalized anxiety disorder)    -  1       Follow-ups after your visit        Your next 10 appointments already scheduled     Sep 19, 2017 11:00 AM CDT   Return Visit with KAYLEE Rodriguez   [facility]    [address] [place] [id number] [phone]              Who to contact     If you have questions or need follow up information about today's clinic visit or your schedule please contact [facility] directly at [phone].  Normal or non-critical lab and imaging results will be communicated to you by MyChart, letter or phone within 4 business days after the clinic has received the results. If you do not hear from us within 7 days, please contact the clinic through MyChart or phone. If you have a critical or abnormal lab result, we will notify you by phone as soon as possible.  Submit refill requests through [employer] or call your pharmacy and they will forward the refill request to us. Please allow 3 business days for your refill to be completed.          Additional Information About Your Visit        MyChart Information     [employer] gives you secure access to your electronic health record. If you see a primary care provider, you can also send messages to your care team and make appointments. If you have questions, please call your primary care clinic.  If you do not have a primary care provider, please call [phone] and they will assist you.        Care EveryWhere ID     This is your Care EveryWhere ID. This could be used by other organizations to access your [facility] medical records  [id number]        Your Vitals Were      Last Period                   06/11/2016            Blood Pressure from Last 3 Encounters:   05/15/17 108/64   03/30/17 126/79   12/06/16 123/70    Weight from Last 3 Encounters:   05/15/17 56.7 kg (125 lb)   03/30/17 55.3 kg (122 lb)   12/06/16 60.6 kg (133 lb 8 oz)              Today, you had the following     No orders found for display       Primary Care Provider Office Phone # Fax #    Brooklynn Leyva [phone] [address]  [facility] [address]        Equal Access to Services     [facility] : Hadii mat lua hadefrem Somanda, wavarshada norah, qaybta kaalmada carlos, ania kahn . So [facility] [phone].    ATENCIÓN: Si habla español, tiene a orona disposición servicios gratuitos de asistencia lingüística. [facility] [phone].    We comply with applicable federal civil rights laws and [place] laws. We do not discriminate on the basis of race, color, national origin, age, disability sex, sexual orientation or gender identity.            Thank you!     Thank you for choosing [facility]  for your care. Our goal is always to provide you with excellent care. Hearing back from our patients is one way we can continue to improve our services. Please take a few minutes to complete the written survey that you may receive in the mail after your visit with us. Thank you!             Your Updated Medication List - Protect others around you: Learn how to safely use, store and throw away your medicines at www.disposemymeds.org.          This list is accurate as of: 9/11/17 12:39 PM.  Always use your most recent med list.                   Brand Name Dispense Instructions for use Diagnosis    escitalopram 20 MG tablet    LEXAPRO    90 tablet    Take 1 tablet (20 mg) by mouth daily    Major depressive disorder, single episode, in full remission (H)       fluconazole 150 MG tablet    DIFLUCAN    1 tablet    TAKE ONE TABLET BY MOUTH ONCE FOR 1 DOSE     Vaginal discharge       ibuprofen 600 MG tablet    ADVIL/MOTRIN    30 tablet    Take 1 tablet (600 mg) by mouth every 6 hours as needed for pain (mild)    Post-op pain       MULTIVITAMIN ADULT PO           VITAMIN D (CHOLECALCIFEROL) PO      Take by mouth daily
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
## 2017-09-12 ASSESSMENT — ANXIETY QUESTIONNAIRES
6. BECOMING EASILY ANNOYED OR IRRITABLE: MORE THAN HALF THE DAYS
GAD7 TOTAL SCORE: 10
1. FEELING NERVOUS, ANXIOUS, OR ON EDGE: SEVERAL DAYS
3. WORRYING TOO MUCH ABOUT DIFFERENT THINGS: MORE THAN HALF THE DAYS
7. FEELING AFRAID AS IF SOMETHING AWFUL MIGHT HAPPEN: SEVERAL DAYS
5. BEING SO RESTLESS THAT IT IS HARD TO SIT STILL: SEVERAL DAYS
IF YOU CHECKED OFF ANY PROBLEMS ON THIS QUESTIONNAIRE, HOW DIFFICULT HAVE THESE PROBLEMS MADE IT FOR YOU TO DO YOUR WORK, TAKE CARE OF THINGS AT HOME, OR GET ALONG WITH OTHER PEOPLE: SOMEWHAT DIFFICULT
2. NOT BEING ABLE TO STOP OR CONTROL WORRYING: MORE THAN HALF THE DAYS

## 2017-09-12 ASSESSMENT — PATIENT HEALTH QUESTIONNAIRE - PHQ9
SUM OF ALL RESPONSES TO PHQ QUESTIONS 1-9: 12
5. POOR APPETITE OR OVEREATING: SEVERAL DAYS

## 2017-09-13 ASSESSMENT — ANXIETY QUESTIONNAIRES: GAD7 TOTAL SCORE: 10

## 2017-09-19 ENCOUNTER — OFFICE VISIT (OUTPATIENT)
Dept: BEHAVIORAL HEALTH | Facility: CLINIC | Age: 52
End: 2017-09-19
Payer: COMMERCIAL

## 2017-09-19 DIAGNOSIS — F41.1 GAD (GENERALIZED ANXIETY DISORDER): Primary | ICD-10-CM

## 2017-09-19 PROCEDURE — 90837 PSYTX W PT 60 MINUTES: CPT | Performed by: SOCIAL WORKER

## 2017-09-19 NOTE — MR AVS SNAPSHOT
After Visit Summary   9/19/2017    Aliyah Askew    MRN: [id number]           Patient Information     Date Of Birth          1965        Visit Information        Provider Department      9/19/2017 11:00 AM Henry Edwards [facility]        Today's Diagnoses     WALLACE (generalized anxiety disorder)    -  1       Follow-ups after your visit        Your next 10 appointments already scheduled     Sep 27, 2017 10:00 AM CDT   Return Visit with KAYLEE Rodriguez   [facility]    [address] [place] [id number] [phone]              Who to contact     If you have questions or need follow up information about today's clinic visit or your schedule please contact [facility] directly at [phone].  Normal or non-critical lab and imaging results will be communicated to you by MyChart, letter or phone within 4 business days after the clinic has received the results. If you do not hear from us within 7 days, please contact the clinic through MyChart or phone. If you have a critical or abnormal lab result, we will notify you by phone as soon as possible.  Submit refill requests through [facility] or call your pharmacy and they will forward the refill request to us. Please allow 3 business days for your refill to be completed.          Additional Information About Your Visit        MyChart Information     [facility] gives you secure access to your electronic health record. If you see a primary care provider, you can also send messages to your care team and make appointments. If you have questions, please call your primary care clinic.  If you do not have a primary care provider, please call [phone] and they will assist you.        Care EveryWhere ID     This is your Care EveryWhere ID. This could be used by other organizations to access your [place] medical records  [id number]        Your Vitals Were      Last Period                   06/11/2016            Blood Pressure from Last 3 Encounters:   05/15/17 108/64   03/30/17 126/79   12/06/16 123/70    Weight from Last 3 Encounters:   05/15/17 56.7 kg (125 lb)   03/30/17 55.3 kg (122 lb)   12/06/16 60.6 kg (133 lb 8 oz)              Today, you had the following     No orders found for display       Primary Care Provider Office Phone # Fax #    Brooklynn Leyva [phone] [address]  [facility] [address]        Equal Access to Services     [facility] : Hadii mat lua hadefrem Somanda, wavarshada norah, qaybta kaalmada carlos, ania kahn . So [facility] [phone].    ATENCIÓN: Si habla español, tiene a orona disposición servicios gratuitos de asistencia lingüística. LlMer[facility] [phone].    We comply with applicable federal civil rights laws and [place] laws. We do not discriminate on the basis of race, color, national origin, age, disability sex, sexual orientation or gender identity.            Thank you!     Thank you for choosing [facility]  for your care. Our goal is always to provide you with excellent care. Hearing back from our patients is one way we can continue to improve our services. Please take a few minutes to complete the written survey that you may receive in the mail after your visit with us. Thank you!             Your Updated Medication List - Protect others around you: Learn how to safely use, store and throw away your medicines at www.disposemymeds.org.          This list is accurate as of: 9/19/17 11:59 PM.  Always use your most recent med list.                   Brand Name Dispense Instructions for use Diagnosis    escitalopram 20 MG tablet    LEXAPRO    90 tablet    Take 1 tablet (20 mg) by mouth daily    Major depressive disorder, single episode, in full remission (H)       fluconazole 150 MG tablet    DIFLUCAN    1 tablet    TAKE ONE TABLET BY MOUTH ONCE FOR 1 DOSE     Vaginal discharge       ibuprofen 600 MG tablet    ADVIL/MOTRIN    30 tablet    Take 1 tablet (600 mg) by mouth every 6 hours as needed for pain (mild)    Post-op pain       MULTIVITAMIN ADULT PO           VITAMIN D (CHOLECALCIFEROL) PO      Take by mouth daily
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
## 2017-09-27 ENCOUNTER — OFFICE VISIT (OUTPATIENT)
Dept: BEHAVIORAL HEALTH | Facility: CLINIC | Age: 52
End: 2017-09-27
Payer: COMMERCIAL

## 2017-09-27 DIAGNOSIS — F41.1 GAD (GENERALIZED ANXIETY DISORDER): Primary | ICD-10-CM

## 2017-09-27 PROCEDURE — 90837 PSYTX W PT 60 MINUTES: CPT | Performed by: SOCIAL WORKER

## 2017-09-27 ASSESSMENT — ANXIETY QUESTIONNAIRES
6. BECOMING EASILY ANNOYED OR IRRITABLE: MORE THAN HALF THE DAYS
5. BEING SO RESTLESS THAT IT IS HARD TO SIT STILL: SEVERAL DAYS
IF YOU CHECKED OFF ANY PROBLEMS ON THIS QUESTIONNAIRE, HOW DIFFICULT HAVE THESE PROBLEMS MADE IT FOR YOU TO DO YOUR WORK, TAKE CARE OF THINGS AT HOME, OR GET ALONG WITH OTHER PEOPLE: VERY DIFFICULT
3. WORRYING TOO MUCH ABOUT DIFFERENT THINGS: SEVERAL DAYS
GAD7 TOTAL SCORE: 10
7. FEELING AFRAID AS IF SOMETHING AWFUL MIGHT HAPPEN: MORE THAN HALF THE DAYS
2. NOT BEING ABLE TO STOP OR CONTROL WORRYING: MORE THAN HALF THE DAYS
1. FEELING NERVOUS, ANXIOUS, OR ON EDGE: SEVERAL DAYS

## 2017-09-27 ASSESSMENT — PATIENT HEALTH QUESTIONNAIRE - PHQ9
5. POOR APPETITE OR OVEREATING: SEVERAL DAYS
SUM OF ALL RESPONSES TO PHQ QUESTIONS 1-9: 10
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
## 2017-09-27 NOTE — MR AVS SNAPSHOT
After Visit Summary   9/27/2017    Aliyah Askew    MRN: [id number]           Patient Information     Date Of Birth          1965        Visit Information        Provider Department      9/27/2017 10:00 AM Henry Edwards [facility]        Today's Diagnoses     WALLACE (generalized anxiety disorder)    -  1       Follow-ups after your visit        Your next 10 appointments already scheduled     Oct 04, 2017 10:30 AM CDT   Return Visit with KAYLEE Rodriguez   [facility] ([facility]    [address] [place] [id number] [phone]              Who to contact     If you have questions or need follow up information about today's clinic visit or your schedule please contact [facility] directly at [phone].  Normal or non-critical lab and imaging results will be communicated to you by MyChart, letter or phone within 4 business days after the clinic has received the results. If you do not hear from us within 7 days, please contact the clinic through MyChart or phone. If you have a critical or abnormal lab result, we will notify you by phone as soon as possible.  Submit refill requests through [facility] or call your pharmacy and they will forward the refill request to us. Please allow 3 business days for your refill to be completed.          Additional Information About Your Visit        MyChart Information     [facility] gives you secure access to your electronic health record. If you see a primary care provider, you can also send messages to your care team and make appointments. If you have questions, please call your primary care clinic.  If you do not have a primary care provider, please call [phone] and they will assist you.        Care EveryWhere ID     This is your Care EveryWhere ID. This could be used by other organizations to access your [facility] medical records  [id number]        Your Vitals Were      Last Period                   06/11/2016            Blood Pressure from Last 3 Encounters:   05/15/17 108/64   03/30/17 126/79   12/06/16 123/70    Weight from Last 3 Encounters:   05/15/17 56.7 kg (125 lb)   03/30/17 55.3 kg (122 lb)   12/06/16 60.6 kg (133 lb 8 oz)              Today, you had the following     No orders found for display       Primary Care Provider Office Phone # Fax #    Brooklynn Leyva [phone] [address]  [facility] [address]        Equal Access to Services     [facility] : Hadii mat lua hadefrem Somanda, wavarshada norah, qaybta kaalmada carlos, ania kahn . So [facility] [phone].    ATENCIÓN: Si habla español, tiene a orona disposición servicios gratuitos de asistencia lingüística. LlCleveland Clinic [facility] [phone].    We comply with applicable federal civil rights laws and [place] laws. We do not discriminate on the basis of race, color, national origin, age, disability sex, sexual orientation or gender identity.            Thank you!     Thank you for choosing [facility]  for your care. Our goal is always to provide you with excellent care. Hearing back from our patients is one way we can continue to improve our services. Please take a few minutes to complete the written survey that you may receive in the mail after your visit with us. Thank you!             Your Updated Medication List - Protect others around you: Learn how to safely use, store and throw away your medicines at www.disposemymeds.org.          This list is accurate as of: 9/27/17 12:03 PM.  Always use your most recent med list.                   Brand Name Dispense Instructions for use Diagnosis    escitalopram 20 MG tablet    LEXAPRO    90 tablet    Take 1 tablet (20 mg) by mouth daily    Major depressive disorder, single episode, in full remission (H)       fluconazole 150 MG tablet    DIFLUCAN    1 tablet    TAKE ONE TABLET BY MOUTH ONCE FOR 1 DOSE     Vaginal discharge       ibuprofen 600 MG tablet    ADVIL/MOTRIN    30 tablet    Take 1 tablet (600 mg) by mouth every 6 hours as needed for pain (mild)    Post-op pain       MULTIVITAMIN ADULT PO           VITAMIN D (CHOLECALCIFEROL) PO      Take by mouth daily

## 2017-09-28 ASSESSMENT — ANXIETY QUESTIONNAIRES: GAD7 TOTAL SCORE: 10

## 2017-10-04 ENCOUNTER — OFFICE VISIT (OUTPATIENT)
Dept: BEHAVIORAL HEALTH | Facility: CLINIC | Age: 52
End: 2017-10-04
Payer: COMMERCIAL

## 2017-10-04 DIAGNOSIS — F41.1 GAD (GENERALIZED ANXIETY DISORDER): Primary | ICD-10-CM

## 2017-10-04 PROCEDURE — 90837 PSYTX W PT 60 MINUTES: CPT | Performed by: SOCIAL WORKER
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
## 2017-10-04 NOTE — MR AVS SNAPSHOT
After Visit Summary   10/4/2017    Aliyah Askew    MRN: [id number]           Patient Information     Date Of Birth          1965        Visit Information        Provider Department      10/4/2017 10:30 AM Henry Edwards [facility]        Today's Diagnoses     WALLACE (generalized anxiety disorder)    -  1       Follow-ups after your visit        Your next 10 appointments already scheduled     Oct 11, 2017 10:00 AM CDT   [facility] K[facility]LEE Rodriguez   [facility]    [address] [place] [address] [phone]              Who to contact     If you have questions or need follow up information about today's clinic visit or your schedule please contact [facility] directly at [phone].  Normal or non-critical lab and imaging results will be communicated to you by MyChart, letter or phone within 4 business days after the clinic has received the results. If you do not hear from us within 7 days, please contact the clinic through MyChart or phone. If you have a critical or abnormal lab result, we will notify you by phone as soon as possible.  Submit refill requests through [employer] or call your pharmacy and they will forward the refill request to us. Please allow 3 business days for your refill to be completed.          Additional Information About Your Visit        MyChart Information     [employer] gives you secure access to your electronic health record. If you see a primary care provider, you can also send messages to your care team and make appointments. If you have questions, please call your primary care clinic.  If you do not have a primary care provider, please call [phone] and they will assist you.        Care EveryWhere ID     This is your Care EveryWhere ID. This could be used by other organizations to access your [place] medical records  [id number]        Your Vitals Were      Last Period                   06/11/2016            Blood Pressure from Last 3 Encounters:   05/15/17 108/64   03/30/17 126/79   12/06/16 123/70    Weight from Last 3 Encounters:   05/15/17 56.7 kg (125 lb)   03/30/17 55.3 kg (122 lb)   12/06/16 60.6 kg (133 lb 8 oz)              Today, you had the following     No orders found for display       Primary Care Provider Office Phone # Fax #    Brooklynn Leyva [phone] [address]  [facility] [address]        Equal Access to Services     [facility] : Hadii mat lua hadmagoo Somnada, waaxda norah, qaybta kaalmada carlos, ania kahn . So [facility] [phone].    ATENCIÓN: Si habla español, tiene a orona disposición servicios gratuitos de asistencia lingüística. LlMcKitrick [facility] [phone].    We comply with applicable federal civil rights laws and [place] laws. We do not discriminate on the basis of race, color, national origin, age, disability, sex, sexual orientation, or gender identity.            Thank you!     Thank you for choosing [facility]  for your care. Our goal is always to provide you with excellent care. Hearing back from our patients is one way we can continue to improve our services. Please take a few minutes to complete the written survey that you may receive in the mail after your visit with us. Thank you!             Your Updated Medication List - Protect others around you: Learn how to safely use, store and throw away your medicines at www.disposemymeds.org.          This list is accurate as of: 10/4/17 11:56 AM.  Always use your most recent med list.                   Brand Name Dispense Instructions for use Diagnosis    escitalopram 20 MG tablet    LEXAPRO    90 tablet    Take 1 tablet (20 mg) by mouth daily    Major depressive disorder, single episode, in full remission (H)       fluconazole 150 MG tablet    DIFLUCAN    1 tablet    TAKE ONE TABLET BY MOUTH ONCE FOR 1 DOSE     Vaginal discharge       ibuprofen 600 MG tablet    ADVIL/MOTRIN    30 tablet    Take 1 tablet (600 mg) by mouth every 6 hours as needed for pain (mild)    Post-op pain       MULTIVITAMIN ADULT PO           VITAMIN D (CHOLECALCIFEROL) PO      Take by mouth daily

## 2017-10-06 ASSESSMENT — ANXIETY QUESTIONNAIRES
IF YOU CHECKED OFF ANY PROBLEMS ON THIS QUESTIONNAIRE, HOW DIFFICULT HAVE THESE PROBLEMS MADE IT FOR YOU TO DO YOUR WORK, TAKE CARE OF THINGS AT HOME, OR GET ALONG WITH OTHER PEOPLE: SOMEWHAT DIFFICULT
6. BECOMING EASILY ANNOYED OR IRRITABLE: MORE THAN HALF THE DAYS
7. FEELING AFRAID AS IF SOMETHING AWFUL MIGHT HAPPEN: SEVERAL DAYS
2. NOT BEING ABLE TO STOP OR CONTROL WORRYING: MORE THAN HALF THE DAYS
1. FEELING NERVOUS, ANXIOUS, OR ON EDGE: SEVERAL DAYS
GAD7 TOTAL SCORE: 9
3. WORRYING TOO MUCH ABOUT DIFFERENT THINGS: MORE THAN HALF THE DAYS
5. BEING SO RESTLESS THAT IT IS HARD TO SIT STILL: NOT AT ALL

## 2017-10-06 ASSESSMENT — PATIENT HEALTH QUESTIONNAIRE - PHQ9
5. POOR APPETITE OR OVEREATING: SEVERAL DAYS
SUM OF ALL RESPONSES TO PHQ QUESTIONS 1-9: 13

## 2017-10-07 ASSESSMENT — ANXIETY QUESTIONNAIRES: GAD7 TOTAL SCORE: 9

## 2017-10-11 ENCOUNTER — OFFICE VISIT (OUTPATIENT)
Dept: BEHAVIORAL HEALTH | Facility: CLINIC | Age: 52
End: 2017-10-11
Payer: COMMERCIAL

## 2017-10-11 DIAGNOSIS — F41.1 GAD (GENERALIZED ANXIETY DISORDER): Primary | ICD-10-CM

## 2017-10-11 PROCEDURE — 90837 PSYTX W PT 60 MINUTES: CPT | Performed by: SOCIAL WORKER

## 2017-10-11 NOTE — MR AVS SNAPSHOT
After Visit Summary   10/11/2017    Aliyah Askew    MRN: [id number]           Patient Information     Date Of Birth          1965        Visit Information        Provider Department      10/11/2017 10:00 AM [facility]        Today's Diagnoses     WALLACE (generalized anxiety disorder)    -  1       Follow-ups after your visit        Your next 10 appointments already scheduled     Oct 18, 2017  4:00 PM CDT   Return Visit with KAYLEE Rodriguez   [facility] ([facility])    [address] [place] [id number] [phone]              Who to contact     If you have questions or need follow up information about today's clinic visit or your schedule please contact [facility] directly at [phone].  Normal or non-critical lab and imaging results will be communicated to you by MyChart, letter or phone within 4 business days after the clinic has received the results. If you do not hear from us within 7 days, please contact the clinic through MyChart or phone. If you have a critical or abnormal lab result, we will notify you by phone as soon as possible.  Submit refill requests through [employer] or call your pharmacy and they will forward the refill request to us. Please allow 3 business days for your refill to be completed.          Additional Information About Your Visit        MyChart Information     [employer] gives you secure access to your electronic health record. If you see a primary care provider, you can also send messages to your care team and make appointments. If you have questions, please call your primary care clinic.  If you do not have a primary care provider, please call [phone] and they will assist you.        Care EveryWhere ID     This is your Care EveryWhere ID. This could be used by other organizations to access your [place] medical records  [id number]        Your Vitals Were      Last Period                   06/11/2016            Blood Pressure from Last 3 Encounters:   05/15/17 108/64   03/30/17 126/79   12/06/16 123/70    Weight from Last 3 Encounters:   05/15/17 56.7 kg (125 lb)   03/30/17 55.3 kg (122 lb)   12/06/16 60.6 kg (133 lb 8 oz)              Today, you had the following     No orders found for display       Primary Care Provider Office Phone # Fax #    Brooklynn Leyva [phone] [address]  [facility] [address]        Equal Access to Services     [facility] : Hadii mat lua hadefrem Somanda, waaxda norah, qaybta kaalmada carlos, ania kahn . So [facility] [phone].    ATENCIÓN: Si habla español, tiene a orona disposición servicios gratuitos de asistencia lingüística. Ll[facility]ty [facility] - [facility] [phone].    We comply with applicable federal civil rights laws and [place] laws. We do not discriminate on the basis of race, color, national origin, age, disability, sex, sexual orientation, or gender identity.            Thank you!     Thank you for choosing [facility]  for your care. Our goal is always to provide you with excellent care. Hearing back from our patients is one way we can continue to improve our services. Please take a few minutes to complete the written survey that you may receive in the mail after your visit with us. Thank you!             Your Updated Medication List - Protect others around you: Learn how to safely use, store and throw away your medicines at www.disposemymeds.org.          This list is accurate as of: 10/11/17 11:59 PM.  Always use your most recent med list.                   Brand Name Dispense Instructions for use Diagnosis    escitalopram 20 MG tablet    LEXAPRO    90 tablet    Take 1 tablet (20 mg) by mouth daily    Major depressive disorder, single episode, in full remission (H)       fluconazole 150 MG tablet    DIFLUCAN    1 tablet    TAKE ONE TABLET BY MOUTH ONCE FOR 1 DOSE     Vaginal discharge       ibuprofen 600 MG tablet    ADVIL/MOTRIN    30 tablet    Take 1 tablet (600 mg) by mouth every 6 hours as needed for pain (mild)    Post-op pain       MULTIVITAMIN ADULT PO           VITAMIN D (CHOLECALCIFEROL) PO      Take by mouth daily
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
## 2017-10-16 ENCOUNTER — TELEPHONE (OUTPATIENT)
Dept: FAMILY MEDICINE | Facility: CLINIC | Age: 52
End: 2017-10-16

## 2017-10-16 NOTE — TELEPHONE ENCOUNTER
Patient called and spoke with writer.    Per patient:  1. Turning over table in living room and it fell on her left foot  2. Table hit top of foot  3. Immediately iced affected area  4. Full ROM of toes and foot  5. Able to bear weight and walk without severe pain  6. No edema present  7. Does have to work at 1500 and will be on her feet  8. Is it just a bone bruise, or could foot be broken?      Writer recommended:  1. Evaluation in clinic tomorrow.  Appt scheduled with MARIANNE Betancur PA-C.  Appt date, time and location confirmed with patient  2. Ice and elevate foot as much as possible.  Patient stated she will be able to elevate foot at work   A. Apply ice for 15-20 minutes every 3 hours or so  3. Can alternate Tylenol and Ibuprofen.  Follow medication label instructions.  Patient denied history of liver or stomach disease/problems  4. Call back with any new, changing or worsening symptoms    Patient verbalized understanding and in agreement with plan.      SHAMEKA Mason, LAIN, RN

## 2017-10-18 ENCOUNTER — OFFICE VISIT (OUTPATIENT)
Dept: BEHAVIORAL HEALTH | Facility: CLINIC | Age: 52
End: 2017-10-18
Payer: COMMERCIAL

## 2017-10-18 DIAGNOSIS — F41.1 GAD (GENERALIZED ANXIETY DISORDER): Primary | ICD-10-CM

## 2017-10-18 PROCEDURE — 90837 PSYTX W PT 60 MINUTES: CPT | Performed by: SOCIAL WORKER

## 2017-10-18 NOTE — MR AVS SNAPSHOT
After Visit Summary   10/18/2017    Aliyah Askew    MRN: [id number]           Patient Information     Date Of Birth          1965        Visit Information        Provider Department      10/18/2017 4:00 PM Henry Edwards, [facility]        Today's Diagnoses     WALLACE (generalized anxiety disorder)    -  1       Follow-ups after your visit        Who to contact     If you have questions or need follow up information about today's clinic visit or your schedule please contact [facility] directly at [phone].  Normal or non-critical lab and imaging results will be communicated to you by Bilb[employer]art, letter or phone within 4 business days after the clinic has received the results. If you do not hear from us within 7 days, please contact the clinic through [employer] or phone. If you have a critical or abnormal lab result, we will notify you by phone as soon as possible.  Submit refill requests through [employer] or call your pharmacy and they will forward the refill request to us. Please allow 3 business days for your refill to be completed.          Additional Information About Your Visit        MyChart Information     [employer] gives you secure access to your electronic health record. If you see a primary care provider, you can also send messages to your care team and make appointments. If you have questions, please call your primary care clinic.  If you do not have a primary care provider, please call [phone] and they will assist you.        Care EveryWhere ID     This is your Care EveryWhere ID. This could be used by other organizations to access your [facility] medical records  [id number]        Your Vitals Were     Last Period                   06/11/2016            Blood Pressure from Last 3 Encounters:   05/15/17 108/64   03/30/17 126/79   12/06/16 123/70    Weight from Last 3 Encounters:   05/15/17 56.7 kg (125 lb)   03/30/17 55.3 kg (122 lb)   12/06/16 60.6  kg (133 lb 8 oz)              Today, you had the following     No orders found for display       Primary Care Provider Office Phone # Fax #    Brooklynn Leyva [phone] [address]  [facility] [address]        Equal Access to Services     BRADLEYRHIANNON JUSTICE : Hadii aad ku hadmagoo Soomaali, waaxda luqadaha, qaybta kaalmada adeegyada, waxrod patricioin nusratn adecornel robledo la'alinemartha brown. So [facility] [phone].    ATENCIÓN: Si habla español, tiene a orona disposición servicios gratuitos de asistencia lingüística. Llame al [phone].    We comply with applicable federal civil rights laws and [place] laws. We do not discriminate on the basis of race, color, national origin, age, disability, sex, sexual orientation, or gender identity.            Thank you!     Thank you for choosing [facility]  for your care. Our goal is always to provide you with excellent care. Hearing back from our patients is one way we can continue to improve our services. Please take a few minutes to complete the written survey that you may receive in the mail after your visit with us. Thank you!             Your Updated Medication List - Protect others around you: Learn how to safely use, store and throw away your medicines at www.disposemymeds.org.          This list is accurate as of: 10/18/17 11:59 PM.  Always use your most recent med list.                   Brand Name Dispense Instructions for use Diagnosis    escitalopram 20 MG tablet    LEXAPRO    90 tablet    Take 1 tablet (20 mg) by mouth daily    Major depressive disorder, single episode, in full remission (H)       fluconazole 150 MG tablet    DIFLUCAN    1 tablet    TAKE ONE TABLET BY MOUTH ONCE FOR 1 DOSE    Vaginal discharge       ibuprofen 600 MG tablet    ADVIL/MOTRIN    30 tablet    Take 1 tablet (600 mg) by mouth every 6 hours as needed for pain (mild)    Post-op pain       MULTIVITAMIN ADULT PO           VITAMIN D (CHOLECALCIFEROL) PO      Take by  mouth daily
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
## 2017-10-23 ENCOUNTER — OFFICE VISIT (OUTPATIENT)
Dept: BEHAVIORAL HEALTH | Facility: CLINIC | Age: 52
End: 2017-10-23
Payer: COMMERCIAL

## 2017-10-23 DIAGNOSIS — F41.1 GAD (GENERALIZED ANXIETY DISORDER): Primary | ICD-10-CM

## 2017-10-23 PROCEDURE — 90837 PSYTX W PT 60 MINUTES: CPT | Performed by: SOCIAL WORKER

## 2017-10-23 NOTE — MR AVS SNAPSHOT
After Visit Summary   10/23/2017    Aliyah Askew    MRN: [id number]           Patient Information     Date Of Birth          1965        Visit Information        Provider Department      10/23/2017 5:30 PM Henry Edwards, [facility]        Today's Diagnoses     WALLACE (generalized anxiety disorder)    -  1       Follow-ups after your visit        Your next 10 appointments already scheduled     Nov 01, 2017 10:30 AM CDT   [facility] KAYLEE Rodriguez   [facility]    [address] [place] [id number] [phone]              Who to contact     If you have questions or need follow up information about today's clinic visit or your schedule please contact [facility] directly at [phone].  Normal or non-critical lab and imaging results will be communicated to you by MyChart, letter or phone within 4 business days after the clinic has received the results. If you do not hear from us within 7 days, please contact the clinic through MyChart or phone. If you have a critical or abnormal lab result, we will notify you by phone as soon as possible.  Submit refill requests through [facility] or call your pharmacy and they will forward the refill request to us. Please allow 3 business days for your refill to be completed.          Additional Information About Your Visit        MyChart Information     [facility] gives you secure access to your electronic health record. If you see a primary care provider, you can also send messages to your care team and make appointments. If you have questions, please call your primary care clinic.  If you do not have a primary care provider, please call [phone] and they will assist you.        Care EveryWhere ID     This is your Care EveryWhere ID. This could be used by other organizations to access your [facility] medical records  [id number]        Your Vitals Were      Last Period                   06/11/2016            Blood Pressure from Last 3 Encounters:   05/15/17 108/64   03/30/17 126/79   12/06/16 123/70    Weight from Last 3 Encounters:   05/15/17 56.7 kg (125 lb)   03/30/17 55.3 kg (122 lb)   12/06/16 60.6 kg (133 lb 8 oz)              Today, you had the following     No orders found for display       Primary Care Provider Office Phone # Fax #    Brooklynn Leyva [phone] [address]  [facility] [address]        Equal Access to Services     [facility] : Hadii mat lua hadefrem Somanda, waaxda norah, qaybta kaalmada carlos, ania kahn . So [facility] [phone].    ATENCIÓN: Si habla español, tiene a orona disposición servicios gratuitos de asistencia lingüística. [facility] [phone].    We comply with applicable federal civil rights laws and [place] laws. We do not discriminate on the basis of race, color, national origin, age, disability, sex, sexual orientation, or gender identity.            Thank you!     Thank you for choosing [facility]  for your care. Our goal is always to provide you with excellent care. Hearing back from our patients is one way we can continue to improve our services. Please take a few minutes to complete the written survey that you may receive in the mail after your visit with us. Thank you!             Your Updated Medication List - Protect others around you: Learn how to safely use, store and throw away your medicines at www.disposemymeds.org.          This list is accurate as of: 10/23/17 11:59 PM.  Always use your most recent med list.                   Brand Name Dispense Instructions for use Diagnosis    escitalopram 20 MG tablet    LEXAPRO    90 tablet    Take 1 tablet (20 mg) by mouth daily    Major depressive disorder, single episode, in full remission (H)       fluconazole 150 MG tablet    DIFLUCAN    1 tablet    TAKE ONE TABLET BY MOUTH ONCE FOR 1 DOSE     Vaginal discharge       ibuprofen 600 MG tablet    ADVIL/MOTRIN    30 tablet    Take 1 tablet (600 mg) by mouth every 6 hours as needed for pain (mild)    Post-op pain       MULTIVITAMIN ADULT PO           VITAMIN D (CHOLECALCIFEROL) PO      Take by mouth daily
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
## 2017-11-01 ENCOUNTER — OFFICE VISIT (OUTPATIENT)
Dept: BEHAVIORAL HEALTH | Facility: CLINIC | Age: 52
End: 2017-11-01
Payer: COMMERCIAL

## 2017-11-01 DIAGNOSIS — F33.0 MILD EPISODE OF RECURRENT MAJOR DEPRESSIVE DISORDER (H): Primary | ICD-10-CM

## 2017-11-01 PROCEDURE — 90837 PSYTX W PT 60 MINUTES: CPT | Performed by: SOCIAL WORKER

## 2017-11-01 ASSESSMENT — PATIENT HEALTH QUESTIONNAIRE - PHQ9
SUM OF ALL RESPONSES TO PHQ QUESTIONS 1-9: 9
SUM OF ALL RESPONSES TO PHQ QUESTIONS 1-9: 9

## 2017-11-01 ASSESSMENT — ANXIETY QUESTIONNAIRES
7. FEELING AFRAID AS IF SOMETHING AWFUL MIGHT HAPPEN: MORE THAN HALF THE DAYS
GAD7 TOTAL SCORE: 9
GAD7 TOTAL SCORE: 9
2. NOT BEING ABLE TO STOP OR CONTROL WORRYING: MORE THAN HALF THE DAYS
1. FEELING NERVOUS, ANXIOUS, OR ON EDGE: SEVERAL DAYS
4. TROUBLE RELAXING: SEVERAL DAYS
3. WORRYING TOO MUCH ABOUT DIFFERENT THINGS: MORE THAN HALF THE DAYS
GAD7 TOTAL SCORE: 9
6. BECOMING EASILY ANNOYED OR IRRITABLE: SEVERAL DAYS
7. FEELING AFRAID AS IF SOMETHING AWFUL MIGHT HAPPEN: MORE THAN HALF THE DAYS
5. BEING SO RESTLESS THAT IT IS HARD TO SIT STILL: NOT AT ALL

## 2017-11-01 NOTE — MR AVS SNAPSHOT
After Visit Summary   11/1/2017    Aliyah Askew    MRN: [id number]           Patient Information     Date Of Birth          1965        Visit Information        Provider Department      11/1/2017 10:30 AM Henry Edwards, [facility]        Today's Diagnoses     Mild episode of recurrent major depressive disorder (H)    -  1       Follow-ups after your visit        Your next 10 appointments already scheduled     Nov 07, 2017  1:20 PM CST   Office Visit with Brooklynn Leyva MD   [facility]    [address] [place] [id number] [phone]           Bring a current list of meds and any records pertaining to this visit. For Physicals, please bring immunization records and any forms needing to be filled out. Please arrive 10 minutes early to complete paperwork.            Nov 08, 2017 10:00 AM CST   Return Visit with KAYLEE Rodriguez   [facility]    [address] [place] [id number] [phone]              Who to contact     If you have questions or need follow up information about today's clinic visit or your schedule please contact [facility] directly at [phone].  Normal or non-critical lab and imaging results will be communicated to you by MyChart, letter or phone within 4 business days after the clinic has received the results. If you do not hear from us within 7 days, please contact the clinic through [employer] or phone. If you have a critical or abnormal lab result, we will notify you by phone as soon as possible.  Submit refill requests through [employer] or call your pharmacy and they will forward the refill request to us. Please allow 3 business days for your refill to be completed.          Additional Information About Your Visit        [employer] Information     [employer] gives you secure access to your electronic health record.  If you see a primary care provider, you can also send messages to your care team and make appointments. If you have questions, please call your primary care clinic.  If you do not have a primary care provider, please call [phone] and they will assist you.        Care EveryWhere ID     This is your Care EveryWhere ID. This could be used by other organizations to access your [facility] medical records  [id number]        Your Vitals Were     Last Period                   06/11/2016            Blood Pressure from Last 3 Encounters:   05/15/17 108/64   03/30/17 126/79   12/06/16 123/70    Weight from Last 3 Encounters:   05/15/17 56.7 kg (125 lb)   03/30/17 55.3 kg (122 lb)   12/06/16 60.6 kg (133 lb 8 oz)              Today, you had the following     No orders found for display       Primary Care Provider Office Phone # Fax #    Brooklynn Leyva [phone] [address]  [facility] [address]        Equal Access to Services     RHIANNON RENDON : Hadii aad ku hadasho Soomaali, waaxda luqadaha, qaybta kaalmada adeegyada, waxay idiin haykevin kahn . So [facility] [phone].    ATENCIÓN: Si habla español, tiene a orona disposición servicios gratuitos de asistencia lingüística. Ll[facility] [phone].    We comply with applicable federal civil rights laws and [place] laws. We do not discriminate on the basis of race, color, national origin, age, disability, sex, sexual orientation, or gender identity.            Thank you!     Thank you for choosing [facility]  for your care. Our goal is always to provide you with excellent care. Hearing back from our patients is one way we can continue to improve our services. Please take a few minutes to complete the written survey that you may receive in the mail after your visit with us. Thank you!             Your Updated Medication List - Protect others around you: Learn how to safely use, store and throw away your medicines at  www.disposemymeds.org.          This list is accurate as of: 11/1/17  4:39 PM.  Always use your most recent med list.                   Brand Name Dispense Instructions for use Diagnosis    escitalopram 20 MG tablet    LEXAPRO    90 tablet    Take 1 tablet (20 mg) by mouth daily    Major depressive disorder, single episode, in full remission (H)       fluconazole 150 MG tablet    DIFLUCAN    1 tablet    TAKE ONE TABLET BY MOUTH ONCE FOR 1 DOSE    Vaginal discharge       ibuprofen 600 MG tablet    ADVIL/MOTRIN    30 tablet    Take 1 tablet (600 mg) by mouth every 6 hours as needed for pain (mild)    Post-op pain       MULTIVITAMIN ADULT PO           VITAMIN D (CHOLECALCIFEROL) PO      Take by mouth daily
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
## 2017-11-02 ASSESSMENT — ANXIETY QUESTIONNAIRES: GAD7 TOTAL SCORE: 9

## 2017-11-02 ASSESSMENT — PATIENT HEALTH QUESTIONNAIRE - PHQ9: SUM OF ALL RESPONSES TO PHQ QUESTIONS 1-9: 9

## 2017-11-07 ENCOUNTER — OFFICE VISIT (OUTPATIENT)
Dept: FAMILY MEDICINE | Facility: CLINIC | Age: 52
End: 2017-11-07
Payer: COMMERCIAL

## 2017-11-07 VITALS
BODY MASS INDEX: 22.14 KG/M2 | OXYGEN SATURATION: 100 % | TEMPERATURE: 97.4 F | RESPIRATION RATE: 14 BRPM | HEART RATE: 77 BPM | SYSTOLIC BLOOD PRESSURE: 104 MMHG | DIASTOLIC BLOOD PRESSURE: 66 MMHG | WEIGHT: 124 LBS

## 2017-11-07 DIAGNOSIS — Z12.11 SCREENING FOR MALIGNANT NEOPLASM OF COLON: ICD-10-CM

## 2017-11-07 DIAGNOSIS — F33.0 MILD EPISODE OF RECURRENT MAJOR DEPRESSIVE DISORDER (H): Primary | ICD-10-CM

## 2017-11-07 DIAGNOSIS — Z13.6 CARDIOVASCULAR SCREENING; LDL GOAL LESS THAN 160: ICD-10-CM

## 2017-11-07 DIAGNOSIS — F41.1 GAD (GENERALIZED ANXIETY DISORDER): ICD-10-CM

## 2017-11-07 DIAGNOSIS — G47.00 INSOMNIA, UNSPECIFIED TYPE: ICD-10-CM

## 2017-11-07 PROCEDURE — 99214 OFFICE O/P EST MOD 30 MIN: CPT | Performed by: FAMILY MEDICINE

## 2017-11-07 RX ORDER — HYDROXYZINE HYDROCHLORIDE 25 MG/1
25-50 TABLET, FILM COATED ORAL EVERY 6 HOURS PRN
Qty: 60 TABLET | Refills: 1 | Status: SHIPPED | OUTPATIENT
Start: 2017-11-07 | End: 2018-04-20

## 2017-11-07 RX ORDER — ESCITALOPRAM OXALATE 20 MG/1
TABLET ORAL
Qty: 30 TABLET | Refills: 0 | Status: CANCELLED | OUTPATIENT
Start: 2017-11-07

## 2017-11-07 RX ORDER — ESCITALOPRAM OXALATE 10 MG/1
10 TABLET ORAL DAILY
Qty: 90 TABLET | Refills: 1 | Status: SHIPPED | OUTPATIENT
Start: 2017-11-07 | End: 2018-04-20

## 2017-11-07 ASSESSMENT — PATIENT HEALTH QUESTIONNAIRE - PHQ9
SUM OF ALL RESPONSES TO PHQ QUESTIONS 1-9: 9
SUM OF ALL RESPONSES TO PHQ QUESTIONS 1-9: 9
10. IF YOU CHECKED OFF ANY PROBLEMS, HOW DIFFICULT HAVE THESE PROBLEMS MADE IT FOR YOU TO DO YOUR WORK, TAKE CARE OF THINGS AT HOME, OR GET ALONG WITH OTHER PEOPLE: SOMEWHAT DIFFICULT

## 2017-11-07 NOTE — PATIENT INSTRUCTIONS
1. You can increase the Lexapro to 10mg daily.   2. You can try taking hydroxyzine half-full tablet at bedtime as needed to help you sleep.  3. Return your FIT test colon cancer screening.

## 2017-11-07 NOTE — NURSING NOTE
"Chief Complaint   Patient presents with     Anxiety       Initial /66  Pulse 77  Temp 97.4  F (36.3  C) (Tympanic)  Resp 14  Wt 124 lb (56.2 kg)  LMP 06/11/2016  SpO2 100%  BMI 22.14 kg/m2 Estimated body mass index is 22.14 kg/(m^2) as calculated from the following:    Height as of 3/30/17: 5' 2.75\" (1.594 m).    Weight as of this encounter: 124 lb (56.2 kg).  Medication Reconciliation: complete     Melissa Orellana MA     "

## 2017-11-07 NOTE — MR AVS SNAPSHOT
After Visit Summary   11/7/2017    Aliyah Askew    MRN: [id number]           Patient Information     Date Of Birth          1965        Visit Information        Provider Department      11/7/2017 1:20 PM Brooklynn Leyva MD [facility]        Today's Diagnoses     Mild episode of recurrent major depressive disorder (H)    -  1    WALLACE (generalized anxiety disorder)        Insomnia, unspecified type        CARDIOVASCULAR SCREENING; LDL GOAL LESS THAN 160        Screening for malignant neoplasm of colon          Care Instructions    1. You can increase the Lexapro to 10mg daily.   2. You can try taking hydroxyzine half-full tablet at bedtime as needed to help you sleep.  3. Return your FIT test colon cancer screening.           Follow-ups after your visit        Your next 10 appointments already scheduled     Nov 08, 2017 10:00 AM CST   Return Visit with Henry Edwards [facility] ([facility])    [address] [place] [id number] [phone]              Future tests that were ordered for you today     Open Future Orders        Priority Expected Expires Ordered    Fecal colorectal cancer screen (FIT) Routine 11/28/2017 1/30/2018 11/7/2017            Who to contact     If you have questions or need follow up information about today's clinic visit or your schedule please contact [facility] directly at [phone].  Normal or non-critical lab and imaging results will be communicated to you by MyChart, letter or phone within 4 business days after the clinic has received the results. If you do not hear from us within 7 days, please contact the clinic through MyChart or phone. If you have a critical or abnormal lab result, we will notify you by phone as soon as possible.  Submit refill requests through [employer] or call your pharmacy and they will forward the refill request to us. Please allow 3 business days for your  refill to be completed.          Additional Information About Your Visit        [employer] Information     [employer] gives you secure access to your electronic health record. If you see a primary care provider, you can also send messages to your care team and make appointments. If you have questions, please call your primary care clinic.  If you do not have a primary care provider, please call [phone] and they will assist you.        Care EveryWhere ID     This is your Care EveryWhere ID. This could be used by other organizations to access your [facility] medical records  [id number]        Your Vitals Were     Pulse Temperature Respirations Last Period Pulse Oximetry BMI (Body Mass Index)    77 97.4  F (36.3  C) (Tympanic) 14 06/11/2016 100% 22.14 kg/m2       Blood Pressure from Last 3 Encounters:   11/07/17 104/66   05/15/17 108/64   03/30/17 126/79    Weight from Last 3 Encounters:   11/07/17 124 lb (56.2 kg)   05/15/17 125 lb (56.7 kg)   03/30/17 122 lb (55.3 kg)              We Performed the Following     DEPRESSION ACTION PLAN (DAP)          Today's Medication Changes          These changes are accurate as of: 11/7/17  2:09 PM.  If you have any questions, ask your nurse or doctor.               Start taking these medicines.        Dose/Directions    escitalopram 10 MG tablet   Commonly known as:  LEXAPRO   Used for:  WALLACE (generalized anxiety disorder), Mild episode of recurrent major depressive disorder (H)   Started by:  Brooklynn Leyva MD        Dose:  10 mg   Take 1 tablet (10 mg) by mouth daily   Quantity:  90 tablet   Refills:  1       hydrOXYzine 25 MG tablet   Commonly known as:  ATARAX   Used for:  WALLACE (generalized anxiety disorder)   Started by:  Brooklynn Leyva MD        Dose:  25-50 mg   Take 1-2 tablets (25-50 mg) by mouth every 6 hours as needed for anxiety (or take at bedtime for insomnia)   Quantity:  60 tablet   Refills:  1            Where to get your medicines      These medications were  sent to [facility] [place], [place] - [address]  [address], [facility] [address]     Phone:  [phone]     escitalopram 10 MG tablet    hydrOXYzine 25 MG tablet                Primary Care Provider Office Phone # Fax #    Brooklynn Leyva [phone]       [address]  [facility] [address]        Equal Access to Services     KRYSTAL RENDON : Hadii aad ku hadasho Soomaali, waaxda luqadaha, qaybta kaalmada adeegyada, waxay idiin hayaan adeeg khvernroxy laclayton . So [place] -[phone].    ATENCIÓN: Si habla espyi, tiene a orona disposición servicios gratuitos de asistencia lingüística. Bryan al [phone].    We comply with applicable federal civil rights laws and [place] laws. We do not discriminate on the basis of race, color, national origin, age, disability, sex, sexual orientation, or gender identity.            Thank you!     Thank you for choosing [facility]  for your care. Our goal is always to provide you with excellent care. Hearing back from our patients is one way we can continue to improve our services. Please take a few minutes to complete the written survey that you may receive in the mail after your visit with us. Thank you!             Your Updated Medication List - Protect others around you: Learn how to safely use, store and throw away your medicines at www.disposemymeds.org.          This list is accurate as of: 11/7/17  2:09 PM.  Always use your most recent med list.                   Brand Name Dispense Instructions for use Diagnosis    escitalopram 10 MG tablet    LEXAPRO    90 tablet    Take 1 tablet (10 mg) by mouth daily    WALLACE (generalized anxiety disorder), Mild episode of recurrent major depressive disorder (H)       hydrOXYzine 25 MG tablet    ATARAX    60 tablet    Take 1-2 tablets (25-50 mg) by mouth every 6 hours as needed for anxiety (or take at bedtime for insomnia)    WALLACE (generalized anxiety disorder)       ibuprofen  600 MG tablet    ADVIL/MOTRIN    30 tablet    Take 1 tablet (600 mg) by mouth every 6 hours as needed for pain (mild)    Post-op pain       MULTIVITAMIN ADULT PO           VITAMIN D (CHOLECALCIFEROL) PO      Take by mouth daily
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
## 2017-11-08 ENCOUNTER — OFFICE VISIT (OUTPATIENT)
Dept: BEHAVIORAL HEALTH | Facility: CLINIC | Age: 52
End: 2017-11-08
Payer: COMMERCIAL

## 2017-11-08 DIAGNOSIS — F33.0 MILD EPISODE OF RECURRENT MAJOR DEPRESSIVE DISORDER (H): Primary | ICD-10-CM

## 2017-11-08 PROCEDURE — 90837 PSYTX W PT 60 MINUTES: CPT | Performed by: SOCIAL WORKER

## 2017-11-08 ASSESSMENT — PATIENT HEALTH QUESTIONNAIRE - PHQ9
SUM OF ALL RESPONSES TO PHQ QUESTIONS 1-9: 10
SUM OF ALL RESPONSES TO PHQ QUESTIONS 1-9: 10
10. IF YOU CHECKED OFF ANY PROBLEMS, HOW DIFFICULT HAVE THESE PROBLEMS MADE IT FOR YOU TO DO YOUR WORK, TAKE CARE OF THINGS AT HOME, OR GET ALONG WITH OTHER PEOPLE: VERY DIFFICULT
SUM OF ALL RESPONSES TO PHQ QUESTIONS 1-9: 9
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
## 2017-11-08 NOTE — MR AVS SNAPSHOT
After Visit Summary   11/8/2017    Aliyah Askew    MRN: [id number]           Patient Information     Date Of Birth          1965        Visit Information        Provider Department      11/8/2017 10:00 AM [facility]        Today's Diagnoses     Mild episode of recurrent major depressive disorder (H)    -  1       Follow-ups after your visit        Your next 10 appointments already scheduled     Nov 15, 2017  9:30 AM CST   Return Visit with KAYLEE Rodriguez   [facility] ([facility])    [address] [place] [id number] [phone]              Future tests that were ordered for you today     Open Future Orders        Priority Expected Expires Ordered    Fecal colorectal cancer screen (FIT) Routine 11/28/2017 1/30/2018 11/7/2017            Who to contact     If you have questions or need follow up information about today's clinic visit or your schedule please contact [facility] directly at [phone].  Normal or non-critical lab and imaging results will be communicated to you by [employer], letter or phone within 4 business days after the clinic has received the results. If you do not hear from us within 7 days, please contact the clinic through [employer] or phone. If you have a critical or abnormal lab result, we will notify you by phone as soon as possible.  Submit refill requests through [employer] or call your pharmacy and they will forward the refill request to us. Please allow 3 business days for your refill to be completed.          Additional Information About Your Visit        [employer] Information     [employer] gives you secure access to your electronic health record. If you see a primary care provider, you can also send messages to your care team and make appointments. If you have questions, please call your primary care clinic.  If you do not have a primary care provider, please call  [phone] and they will assist you.        Care EveryWhere ID     This is your Care EveryWhere ID. This could be used by other organizations to access your [place] medical records  [id number]        Your Vitals Were     Last Period                   06/11/2016            Blood Pressure from Last 3 Encounters:   11/07/17 104/66   05/15/17 108/64   03/30/17 126/79    Weight from Last 3 Encounters:   11/07/17 56.2 kg (124 lb)   05/15/17 56.7 kg (125 lb)   03/30/17 55.3 kg (122 lb)              Today, you had the following     No orders found for display       Primary Care Provider Office Phone # Fax #    Brooklynn Leyva [phone] [address]  [facility] [address]        Equal Access to Services     RHIANNON RENDON : Hadii mat lua hadasho Soomaali, waaxda luqadaha, qaybta kaalmada adecornelyabailey, ania kahn . So [facility] [phone].    ATENCIÓN: Si habla español, tiene a orona disposición servicios gratuitos de asistencia lingüística. Bryan al [phone].    We comply with applicable federal civil rights laws and [place] laws. We do not discriminate on the basis of race, color, national origin, age, disability, sex, sexual orientation, or gender identity.            Thank you!     Thank you for choosing [facility]  for your care. Our goal is always to provide you with excellent care. Hearing back from our patients is one way we can continue to improve our services. Please take a few minutes to complete the written survey that you may receive in the mail after your visit with us. Thank you!             Your Updated Medication List - Protect others around you: Learn how to safely use, store and throw away your medicines at www.disposemymeds.org.          This list is accurate as of: 11/8/17 12:20 PM.  Always use your most recent med list.                   Brand Name Dispense Instructions for use Diagnosis    escitalopram 10 MG tablet    LEXAPRO    90  tablet    Take 1 tablet (10 mg) by mouth daily    WALLACE (generalized anxiety disorder), Mild episode of recurrent major depressive disorder (H)       hydrOXYzine 25 MG tablet    ATARAX    60 tablet    Take 1-2 tablets (25-50 mg) by mouth every 6 hours as needed for anxiety (or take at bedtime for insomnia)    WALLACE (generalized anxiety disorder), Insomnia, unspecified type       ibuprofen 600 MG tablet    ADVIL/MOTRIN    30 tablet    Take 1 tablet (600 mg) by mouth every 6 hours as needed for pain (mild)    Post-op pain       MULTIVITAMIN ADULT PO           VITAMIN D (CHOLECALCIFEROL) PO      Take by mouth daily

## 2017-11-09 ASSESSMENT — PATIENT HEALTH QUESTIONNAIRE - PHQ9: SUM OF ALL RESPONSES TO PHQ QUESTIONS 1-9: 10

## 2017-11-15 ENCOUNTER — OFFICE VISIT (OUTPATIENT)
Dept: BEHAVIORAL HEALTH | Facility: CLINIC | Age: 52
End: 2017-11-15
Payer: COMMERCIAL

## 2017-11-15 DIAGNOSIS — F33.1 MODERATE EPISODE OF RECURRENT MAJOR DEPRESSIVE DISORDER (H): Primary | ICD-10-CM

## 2017-11-15 PROCEDURE — 90837 PSYTX W PT 60 MINUTES: CPT | Performed by: SOCIAL WORKER

## 2017-11-15 ASSESSMENT — ANXIETY QUESTIONNAIRES
7. FEELING AFRAID AS IF SOMETHING AWFUL MIGHT HAPPEN: SEVERAL DAYS
GAD7 TOTAL SCORE: 12
1. FEELING NERVOUS, ANXIOUS, OR ON EDGE: NEARLY EVERY DAY
6. BECOMING EASILY ANNOYED OR IRRITABLE: SEVERAL DAYS
2. NOT BEING ABLE TO STOP OR CONTROL WORRYING: NEARLY EVERY DAY
GAD7 TOTAL SCORE: 12
7. FEELING AFRAID AS IF SOMETHING AWFUL MIGHT HAPPEN: SEVERAL DAYS
5. BEING SO RESTLESS THAT IT IS HARD TO SIT STILL: NOT AT ALL
4. TROUBLE RELAXING: MORE THAN HALF THE DAYS
GAD7 TOTAL SCORE: 12
3. WORRYING TOO MUCH ABOUT DIFFERENT THINGS: MORE THAN HALF THE DAYS

## 2017-11-15 ASSESSMENT — PATIENT HEALTH QUESTIONNAIRE - PHQ9
SUM OF ALL RESPONSES TO PHQ QUESTIONS 1-9: 19
SUM OF ALL RESPONSES TO PHQ QUESTIONS 1-9: 19

## 2017-11-15 NOTE — MR AVS SNAPSHOT
After Visit Summary   11/15/2017    Aliyah Askew    MRN: [id number]           Patient Information     Date Of Birth          1965        Visit Information        Provider Department      11/15/2017 9:30 AM Henry Edwards, [facility]        Today's Diagnoses     Moderate episode of recurrent major depressive disorder (H)    -  1       Follow-ups after your visit        Your next 10 appointments already scheduled     Nov 22, 2017  9:30 AM CST   Return Visit with KAYLEE Rodriguez   [facility]    [address] [place] [id number] [phone]              Who to contact     If you have questions or need follow up information about today's clinic visit or your schedule please contact [facility] directly at [phone].  Normal or non-critical lab and imaging results will be communicated to you by MyChart, letter or phone within 4 business days after the clinic has received the results. If you do not hear from us within 7 days, please contact the clinic through [employer] or phone. If you have a critical or abnormal lab result, we will notify you by phone as soon as possible.  Submit refill requests through [employer] or call your pharmacy and they will forward the refill request to us. Please allow 3 business days for your refill to be completed.          Additional Information About Your Visit        MyChart Information     [employer] gives you secure access to your electronic health record. If you see a primary care provider, you can also send messages to your care team and make appointments. If you have questions, please call your primary care clinic.  If you do not have a primary care provider, please call [phone] and they will assist you.        Care EveryWhere ID     This is your Care EveryWhere ID. This could be used by other organizations to access your [facility]  records  [id number]        Your Vitals Were     Last Period                   06/11/2016            Blood Pressure from Last 3 Encounters:   11/07/17 104/66   05/15/17 108/64   03/30/17 126/79    Weight from Last 3 Encounters:   11/07/17 56.2 kg (124 lb)   05/15/17 56.7 kg (125 lb)   03/30/17 55.3 kg (122 lb)              Today, you had the following     No orders found for display       Primary Care Provider Office Phone # Fax #    Brooklynn Leyva [phone]       [address]  [facility] [address]        Equal Access to Services     [facility] : Hadii mat lua hadmagoo Somanda, waaxda lurhodaadaha, qaybta kaalmada carlos, ania kahn . So [facility] [phone].    ATENCIÓN: Si habla español, tiene a orona disposición servicios gratuitos de asistencia lingüística. LlFirelands [facility] [phone].    We comply with applicable federal civil rights laws and [place] laws. We do not discriminate on the basis of race, color, national origin, age, disability, sex, sexual orientation, or gender identity.            Thank you!     Thank you for choosing [facility]note 2[facility]  for your care. Our goal is always to provide you with excellent care. Hearing back from our patients is one way we can continue to improve our services. Please take a few minutes to complete the written survey that you may receive in the mail after your visit with us. Thank you!             Your Updated Medication List - Protect others around you: Learn how to safely use, store and throw away your medicines at www.disposemymeds.org.          This list is accurate as of: 11/15/17 11:59 PM.  Always use your most recent med list.                   Brand Name Dispense Instructions for use Diagnosis    escitalopram 10 MG tablet    LEXAPRO    90 tablet    Take 1 tablet (10 mg) by mouth daily    WALLACE (generalized anxiety disorder), Mild episode of recurrent major depressive disorder (H)       hydrOXYzine 25 MG tablet     ATARAX    60 tablet    Take 1-2 tablets (25-50 mg) by mouth every 6 hours as needed for anxiety (or take at bedtime for insomnia)    WALLACE (generalized anxiety disorder), Insomnia, unspecified type       ibuprofen 600 MG tablet    ADVIL/MOTRIN    30 tablet    Take 1 tablet (600 mg) by mouth every 6 hours as needed for pain (mild)    Post-op pain       MULTIVITAMIN ADULT PO           VITAMIN D (CHOLECALCIFEROL) PO      Take by mouth daily

## 2017-11-16 ASSESSMENT — ANXIETY QUESTIONNAIRES: GAD7 TOTAL SCORE: 12

## 2017-11-16 ASSESSMENT — PATIENT HEALTH QUESTIONNAIRE - PHQ9: SUM OF ALL RESPONSES TO PHQ QUESTIONS 1-9: 19
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
## 2017-11-22 ENCOUNTER — OFFICE VISIT (OUTPATIENT)
Dept: BEHAVIORAL HEALTH | Facility: CLINIC | Age: 52
End: 2017-11-22
Payer: COMMERCIAL

## 2017-11-22 DIAGNOSIS — F33.1 MODERATE EPISODE OF RECURRENT MAJOR DEPRESSIVE DISORDER (H): Primary | ICD-10-CM

## 2017-11-22 PROCEDURE — 90837 PSYTX W PT 60 MINUTES: CPT | Performed by: SOCIAL WORKER

## 2017-11-22 ASSESSMENT — PATIENT HEALTH QUESTIONNAIRE - PHQ9
SUM OF ALL RESPONSES TO PHQ QUESTIONS 1-9: 9
10. IF YOU CHECKED OFF ANY PROBLEMS, HOW DIFFICULT HAVE THESE PROBLEMS MADE IT FOR YOU TO DO YOUR WORK, TAKE CARE OF THINGS AT HOME, OR GET ALONG WITH OTHER PEOPLE: SOMEWHAT DIFFICULT
SUM OF ALL RESPONSES TO PHQ QUESTIONS 1-9: 9

## 2017-11-22 NOTE — MR AVS SNAPSHOT
After Visit Summary   11/22/2017    Aliyah Askew    MRN: [id number]           Patient Information     Date Of Birth          1965        Visit Information        Provider Department      11/22/2017 9:30 AM Henry Edwards, [facility]        Today's Diagnoses     Moderate episode of recurrent major depressive disorder (H)    -  1       Follow-ups after your visit        Your next 10 appointments already scheduled     Nov 28, 2017 11:30 AM CST   Return Visit with KAYLEE Rodriguez   [facility] ([facility])    [address] [place] [id number] [phone]              Who to contact     If you have questions or need follow up information about today's clinic visit or your schedule please contact [facility] directly at [phone].  Normal or non-critical lab and imaging results will be communicated to you by MyChart, letter or phone within 4 business days after the clinic has received the results. If you do not hear from us within 7 days, please contact the clinic through [employer] or phone. If you have a critical or abnormal lab result, we will notify you by phone as soon as possible.  Submit refill requests through [email or web address] or call your pharmacy and they will forward the refill request to us. Please allow 3 business days for your refill to be completed.          Additional Information About Your Visit        MyChart Information     [email or web address] gives you secure access to your electronic health record. If you see a primary care provider, you can also send messages to your care team and make appointments. If you have questions, please call your primary care clinic.  If you do not have a primary care provider, please call [phone] and they will assist you.        Care EveryWhere ID     This is your Care EveryWhere ID. This could be used by other organizations to access your [facility]  records  [id number]        Your Vitals Were     Last Period                   06/11/2016            Blood Pressure from Last 3 Encounters:   11/07/17 104/66   05/15/17 108/64   03/30/17 126/79    Weight from Last 3 Encounters:   11/07/17 56.2 kg (124 lb)   05/15/17 56.7 kg (125 lb)   03/30/17 55.3 kg (122 lb)              Today, you had the following     No orders found for display       Primary Care Provider Office Phone # Fax #    Brooklynn Leyva [phone]       [address]  [facility] [address]        Equal Access to Services     [facility] Hospital[facility] : Hadii mat lua hadmagoo Somanda, waaxda lurhodaadaha, qaybta kaalmada carlos, ania kahn . So [facility] [phone].    ATENCIÓN: Si habla español, tiene a orona disposición servicios gratuitos de asistencia lingüística. LlUniversity Hospitals St[facility] [phone].    We comply with applicable federal civil rights laws and [place] laws. We do not discriminate on the basis of race, color, national origin, age, disability, sex, sexual orientation, or gender identity.            Thank you!     Thank you for choosing [facility]  for your care. Our goal is always to provide you with excellent care. Hearing back from our patients is one way we can continue to improve our services. Please take a few minutes to complete the written survey that you may receive in the mail after your visit with us. Thank you!             Your Updated Medication List - Protect others around you: Learn how to safely use, store and throw away your medicines at www.disposemymeds.org.          This list is accurate as of: 11/22/17  6:07 PM.  Always use your most recent med list.                   Brand Name Dispense Instructions for use Diagnosis    escitalopram 10 MG tablet    LEXAPRO    90 tablet    Take 1 tablet (10 mg) by mouth daily    WALLACE (generalized anxiety disorder), Mild episode of recurrent major depressive disorder (H)       hydrOXYzine 25 MG tablet     ATARAX    60 tablet    Take 1-2 tablets (25-50 mg) by mouth every 6 hours as needed for anxiety (or take at bedtime for insomnia)    WALLACE (generalized anxiety disorder), Insomnia, unspecified type       ibuprofen 600 MG tablet    ADVIL/MOTRIN    30 tablet    Take 1 tablet (600 mg) by mouth every 6 hours as needed for pain (mild)    Post-op pain       MULTIVITAMIN ADULT PO           VITAMIN D (CHOLECALCIFEROL) PO      Take by mouth daily
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
## 2017-11-23 ASSESSMENT — PATIENT HEALTH QUESTIONNAIRE - PHQ9: SUM OF ALL RESPONSES TO PHQ QUESTIONS 1-9: 9

## 2017-11-28 ENCOUNTER — OFFICE VISIT (OUTPATIENT)
Dept: BEHAVIORAL HEALTH | Facility: CLINIC | Age: 52
End: 2017-11-28
Payer: COMMERCIAL

## 2017-11-28 DIAGNOSIS — F41.1 GAD (GENERALIZED ANXIETY DISORDER): Primary | ICD-10-CM

## 2017-11-28 PROCEDURE — 90834 PSYTX W PT 45 MINUTES: CPT | Performed by: SOCIAL WORKER

## 2017-11-28 ASSESSMENT — PATIENT HEALTH QUESTIONNAIRE - PHQ9
SUM OF ALL RESPONSES TO PHQ QUESTIONS 1-9: 7
SUM OF ALL RESPONSES TO PHQ QUESTIONS 1-9: 7
10. IF YOU CHECKED OFF ANY PROBLEMS, HOW DIFFICULT HAVE THESE PROBLEMS MADE IT FOR YOU TO DO YOUR WORK, TAKE CARE OF THINGS AT HOME, OR GET ALONG WITH OTHER PEOPLE: SOMEWHAT DIFFICULT

## 2017-11-28 NOTE — MR AVS SNAPSHOT
After Visit Summary   11/28/2017    Aliyah Askew    MRN: [id number]           Patient Information     Date Of Birth          1965        Visit Information        Provider Department      11/28/2017 11:30 AM Henry Edwards, [facility]        Today's Diagnoses     WALLACE (generalized anxiety disorder)    -  1       Follow-ups after your visit        Who to contact     If you have questions or need follow up information about today's clinic visit or your schedule please contact [facility] directly at [phone].  Normal or non-critical lab and imaging results will be communicated to you by [employer]hart, letter or phone within 4 business days after the clinic has received the results. If you do not hear from us within 7 days, please contact the clinic through [employer] or phone. If you have a critical or abnormal lab result, we will notify you by phone as soon as possible.  Submit refill requests through [employer] or call your pharmacy and they will forward the refill request to us. Please allow 3 business days for your refill to be completed.          Additional Information About Your Visit        MyChart Information     [employer] gives you secure access to your electronic health record. If you see a primary care provider, you can also send messages to your care team and make appointments. If you have questions, please call your primary care clinic.  If you do not have a primary care provider, please call [phone] and they will assist you.        Care EveryWhere ID     This is your Care EveryWhere ID. This could be used by other organizations to access your [place] medical records  [id number]        Your Vitals Were     Last Period                   06/11/2016            Blood Pressure from Last 3 Encounters:   11/07/17 104/66   05/15/17 108/64   03/30/17 126/79    Weight from Last 3 Encounters:   11/07/17 56.2 kg (124 lb)   05/15/17 56.7 kg (125 lb)   03/30/17  55.3 kg (122 lb)              Today, you had the following     No orders found for display       Primary Care Provider Office Phone # Fax #    Brooklynn Leyva [phone] [address]  [facility] [address]        Equal Access to Services     BRADLEYRHIANNON JUSTICE : Hadii aad ku hadasho Soomaali, waaxda luqadaha, qaybta kaalmada adeegyada, waxay patricioin hayalinen adecornel robledo laruthmartha . So [facility] [phone].    ATENCIÓN: Si habla español, tiene a orona disposición servicios gratuitos de asistencia lingüística. Llame al [phone].    We comply with applicable federal civil rights laws and [place] laws. We do not discriminate on the basis of race, color, national origin, age, disability, sex, sexual orientation, or gender identity.            Thank you!     Thank you for choosing [facility]  for your care. Our goal is always to provide you with excellent care. Hearing back from our patients is one way we can continue to improve our services. Please take a few minutes to complete the written survey that you may receive in the mail after your visit with us. Thank you!             Your Updated Medication List - Protect others around you: Learn how to safely use, store and throw away your medicines at www.disposemymeds.org.          This list is accurate as of: 11/28/17 11:59 PM.  Always use your most recent med list.                   Brand Name Dispense Instructions for use Diagnosis    escitalopram 10 MG tablet    LEXAPRO    90 tablet    Take 1 tablet (10 mg) by mouth daily    WALLACE (generalized anxiety disorder), Mild episode of recurrent major depressive disorder (H)       hydrOXYzine 25 MG tablet    ATARAX    60 tablet    Take 1-2 tablets (25-50 mg) by mouth every 6 hours as needed for anxiety (or take at bedtime for insomnia)    WALLACE (generalized anxiety disorder), Insomnia, unspecified type       ibuprofen 600 MG tablet    ADVIL/MOTRIN    30 tablet    Take 1 tablet (600 mg) by mouth every  6 hours as needed for pain (mild)    Post-op pain       MULTIVITAMIN ADULT PO           VITAMIN D (CHOLECALCIFEROL) PO      Take by mouth daily

## 2017-11-29 ASSESSMENT — PATIENT HEALTH QUESTIONNAIRE - PHQ9: SUM OF ALL RESPONSES TO PHQ QUESTIONS 1-9: 7
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
## 2017-12-04 ENCOUNTER — OFFICE VISIT (OUTPATIENT)
Dept: BEHAVIORAL HEALTH | Facility: CLINIC | Age: 52
End: 2017-12-04
Payer: COMMERCIAL

## 2017-12-04 DIAGNOSIS — F41.1 GAD (GENERALIZED ANXIETY DISORDER): Primary | ICD-10-CM

## 2017-12-04 PROCEDURE — 90837 PSYTX W PT 60 MINUTES: CPT | Performed by: SOCIAL WORKER

## 2017-12-04 ASSESSMENT — ANXIETY QUESTIONNAIRES
5. BEING SO RESTLESS THAT IT IS HARD TO SIT STILL: NOT AT ALL
1. FEELING NERVOUS, ANXIOUS, OR ON EDGE: MORE THAN HALF THE DAYS
GAD7 TOTAL SCORE: 8
6. BECOMING EASILY ANNOYED OR IRRITABLE: SEVERAL DAYS
2. NOT BEING ABLE TO STOP OR CONTROL WORRYING: MORE THAN HALF THE DAYS
7. FEELING AFRAID AS IF SOMETHING AWFUL MIGHT HAPPEN: SEVERAL DAYS
GAD7 TOTAL SCORE: 8
7. FEELING AFRAID AS IF SOMETHING AWFUL MIGHT HAPPEN: SEVERAL DAYS
4. TROUBLE RELAXING: SEVERAL DAYS
3. WORRYING TOO MUCH ABOUT DIFFERENT THINGS: SEVERAL DAYS
GAD7 TOTAL SCORE: 8

## 2017-12-04 ASSESSMENT — PATIENT HEALTH QUESTIONNAIRE - PHQ9
SUM OF ALL RESPONSES TO PHQ QUESTIONS 1-9: 12
10. IF YOU CHECKED OFF ANY PROBLEMS, HOW DIFFICULT HAVE THESE PROBLEMS MADE IT FOR YOU TO DO YOUR WORK, TAKE CARE OF THINGS AT HOME, OR GET ALONG WITH OTHER PEOPLE: VERY DIFFICULT
SUM OF ALL RESPONSES TO PHQ QUESTIONS 1-9: 12

## 2017-12-04 NOTE — MR AVS SNAPSHOT
After Visit Summary   12/4/2017    Aliyah Askew    MRN: [id number]           Patient Information     Date Of Birth          1965        Visit Information        Provider Department      12/4/2017 10:30 AM [facility], [facility]        Today's Diagnoses     WALLACE (generalized anxiety disorder)    -  1       Follow-ups after your visit        Your next 10 appointments already scheduled     Dec 12, 2017 10:30 AM CST   [facility]    [address] [place] [id number] [phone]            Dec 18, 2017 11:00 AM CST   [facility]    [address] [place] [id number] [phone]              Who to contact     If you have questions or need follow up information about today's clinic visit or your schedule please contact [facility] directly at [phone].  Normal or non-critical lab and imaging results will be communicated to you by [employer], letter or phone within 4 business days after the clinic has received the results. If you do not hear from us within 7 days, please contact the clinic through [employer] or phone. If you have a critical or abnormal lab result, we will notify you by phone as soon as possible.  Submit refill requests through [employer] or call your pharmacy and they will forward the refill request to us. Please allow 3 business days for your refill to be completed.          Additional Information About Your Visit        [employer] Information     [employer] gives you secure access to your electronic health record. If you see a primary care provider, you can also send messages to your care team and make appointments. If you have questions, please call your primary care clinic.  If you do not have a primary care provider, please call [phone] and  they will assist you.        Care EveryWhere ID     This is your Care EveryWhere ID. This could be used by other organizations to access your [place] medical records  [id number]        Your Vitals Were     Last Period                   06/11/2016            Blood Pressure from Last 3 Encounters:   11/07/17 104/66   05/15/17 108/64   03/30/17 126/79    Weight from Last 3 Encounters:   11/07/17 56.2 kg (124 lb)   05/15/17 56.7 kg (125 lb)   03/30/17 55.3 kg (122 lb)              Today, you had the following     No orders found for display       Primary Care Provider Office Phone # Fax #    Brooklynn Leyva [phone] [address] [facility] [address]        Equal Access to Services     KRYSTAL RENDON : Hadii mat lua hadasho Soomaali, waaxda luqadaha, qaybta kaalmada adeegyada, ania kahn . So [facility] [phone].    ATENCIÓN: Si habla español, tiene a orona disposición servicios gratuitos de asistencia lingüística. Llame al [phone].    We comply with applicable federal civil rights laws and [place] laws. We do not discriminate on the basis of race, color, national origin, age, disability, sex, sexual orientation, or gender identity.            Thank you!     Thank you for choosing [facility]  for your care. Our goal is always to provide you with excellent care. Hearing back from our patients is one way we can continue to improve our services. Please take a few minutes to complete the written survey that you may receive in the mail after your visit with us. Thank you!             Your Updated Medication List - Protect others around you: Learn how to safely use, store and throw away your medicines at www.disposemymeds.org.          This list is accurate as of: 12/4/17  2:35 PM.  Always use your most recent med list.                   Brand Name Dispense Instructions for use Diagnosis    escitalopram 10 MG tablet    LEXAPRO    90 tablet    Take 1  tablet (10 mg) by mouth daily    WALLACE (generalized anxiety disorder), Mild episode of recurrent major depressive disorder (H)       hydrOXYzine 25 MG tablet    ATARAX    60 tablet    Take 1-2 tablets (25-50 mg) by mouth every 6 hours as needed for anxiety (or take at bedtime for insomnia)    WALLACE (generalized anxiety disorder), Insomnia, unspecified type       ibuprofen 600 MG tablet    ADVIL/MOTRIN    30 tablet    Take 1 tablet (600 mg) by mouth every 6 hours as needed for pain (mild)    Post-op pain       MULTIVITAMIN ADULT PO           VITAMIN D (CHOLECALCIFEROL) PO      Take by mouth daily
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
## 2017-12-05 ASSESSMENT — PATIENT HEALTH QUESTIONNAIRE - PHQ9: SUM OF ALL RESPONSES TO PHQ QUESTIONS 1-9: 12

## 2017-12-05 ASSESSMENT — ANXIETY QUESTIONNAIRES: GAD7 TOTAL SCORE: 8

## 2017-12-12 ENCOUNTER — OFFICE VISIT (OUTPATIENT)
Dept: BEHAVIORAL HEALTH | Facility: CLINIC | Age: 52
End: 2017-12-12
Payer: COMMERCIAL

## 2017-12-12 DIAGNOSIS — F41.1 GAD (GENERALIZED ANXIETY DISORDER): Primary | ICD-10-CM

## 2017-12-12 PROCEDURE — 90837 PSYTX W PT 60 MINUTES: CPT | Performed by: SOCIAL WORKER

## 2017-12-12 NOTE — MR AVS SNAPSHOT
After Visit Summary   12/12/2017    Aliyah Askew    MRN: [id number]           Patient Information     Date Of Birth          1965        Visit Information        Provider Department      12/12/2017 10:30 AM [facility]        Today's Diagnoses     WALLACE (generalized anxiety disorder)    -  1       Follow-ups after your visit        Your next 10 appointments already scheduled     Dec 18, 2017 11:00 AM CST   [facility]    [address] [place] [id number] [phone]              Who to contact     If you have questions or need follow up information about today's clinic visit or your schedule please contact [facility] directly at [phone].  Normal or non-critical lab and imaging results will be communicated to you by MyChart, letter or phone within 4 business days after the clinic has received the results. If you do not hear from us within 7 days, please contact the clinic through MyChart or phone. If you have a critical or abnormal lab result, we will notify you by phone as soon as possible.  Submit refill requests through [employer] or call your pharmacy and they will forward the refill request to us. Please allow 3 business days for your refill to be completed.          Additional Information About Your Visit        MyChart Information     [employer] gives you secure access to your electronic health record. If you see a primary care provider, you can also send messages to your care team and make appointments. If you have questions, please call your primary care clinic.  If you do not have a primary care provider, please call [phone] and they will assist you.        Care EveryWhere ID     This is your Care EveryWhere ID. This could be used by other organizations to access your [facility] medical records  [id number]        Your Vitals Were      Last Period                   06/11/2016            Blood Pressure from Last 3 Encounters:   11/07/17 104/66   05/15/17 108/64   03/30/17 126/79    Weight from Last 3 Encounters:   11/07/17 56.2 kg (124 lb)   05/15/17 56.7 kg (125 lb)   03/30/17 55.3 kg (122 lb)              Today, you had the following     No orders found for display       Primary Care Provider Office Phone # Fax #    Brooklynn Leyva [phone] [address]  [facility] [address]        Equal Access to Services     [facility]: Hadii mat lua hadasho Somanda, waaxda khurramadaha, qaybta kaalmada carlos, ania kahn . [facility] [phone].    ATENCIÓN: Si habla español, tiene a orona disposición servicios gratuitos de asistencia lingüística. [facility] [phone].    We comply with applicable federal civil rights laws and [place] laws. We do not discriminate on the basis of race, color, national origin, age, disability, sex, sexual orientation, or gender identity.            Thank you!     Thank you for choosing [facility]  for your care. Our goal is always to provide you with excellent care. Hearing back from our patients is one way we can continue to improve our services. Please take a few minutes to complete the written survey that you may receive in the mail after your visit with us. Thank you!             Your Updated Medication List - Protect others around you: Learn how to safely use, store and throw away your medicines at www.disposemymeds.org.          This list is accurate as of: 12/12/17  1:25 PM.  Always use your most recent med list.                   Brand Name Dispense Instructions for use Diagnosis    escitalopram 10 MG tablet    LEXAPRO    90 tablet    Take 1 tablet (10 mg) by mouth daily    WALLACE (generalized anxiety disorder), Mild episode of recurrent major depressive disorder (H)       hydrOXYzine 25 MG tablet    ATARAX    60 tablet    Take 1-2 tablets (25-50  mg) by mouth every 6 hours as needed for anxiety (or take at bedtime for insomnia)    WALLACE (generalized anxiety disorder), Insomnia, unspecified type       ibuprofen 600 MG tablet    ADVIL/MOTRIN    30 tablet    Take 1 tablet (600 mg) by mouth every 6 hours as needed for pain (mild)    Post-op pain       MULTIVITAMIN ADULT PO           VITAMIN D (CHOLECALCIFEROL) PO      Take by mouth daily
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
## 2017-12-13 ASSESSMENT — PATIENT HEALTH QUESTIONNAIRE - PHQ9: SUM OF ALL RESPONSES TO PHQ QUESTIONS 1-9: 7

## 2017-12-18 ENCOUNTER — OFFICE VISIT (OUTPATIENT)
Dept: BEHAVIORAL HEALTH | Facility: CLINIC | Age: 52
End: 2017-12-18
Payer: COMMERCIAL

## 2017-12-18 DIAGNOSIS — F41.1 GAD (GENERALIZED ANXIETY DISORDER): Primary | ICD-10-CM

## 2017-12-18 PROCEDURE — 90837 PSYTX W PT 60 MINUTES: CPT | Performed by: SOCIAL WORKER

## 2017-12-18 ASSESSMENT — ANXIETY QUESTIONNAIRES
6. BECOMING EASILY ANNOYED OR IRRITABLE: SEVERAL DAYS
GAD7 TOTAL SCORE: 6
7. FEELING AFRAID AS IF SOMETHING AWFUL MIGHT HAPPEN: SEVERAL DAYS
GAD7 TOTAL SCORE: 6
GAD7 TOTAL SCORE: 6
4. TROUBLE RELAXING: SEVERAL DAYS
3. WORRYING TOO MUCH ABOUT DIFFERENT THINGS: SEVERAL DAYS
5. BEING SO RESTLESS THAT IT IS HARD TO SIT STILL: NOT AT ALL
1. FEELING NERVOUS, ANXIOUS, OR ON EDGE: SEVERAL DAYS
2. NOT BEING ABLE TO STOP OR CONTROL WORRYING: SEVERAL DAYS
7. FEELING AFRAID AS IF SOMETHING AWFUL MIGHT HAPPEN: SEVERAL DAYS
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
## 2017-12-18 NOTE — MR AVS SNAPSHOT
After Visit Summary   12/18/2017    Aliyah Askew    MRN: [id number]           Patient Information     Date Of Birth          1965        Visit Information        Provider Department      12/18/2017 11:00 AM Henry Edwards [facility]        Today's Diagnoses     WALLACE (generalized anxiety disorder)    -  1       Follow-ups after your visit        Your next 10 appointments already scheduled     Jan 03, 2018  9:30 AM CST   [facility]   [facility]    [address] [place] [id number] [phone]              Who to contact     If you have questions or need follow up information about today's clinic visit or your schedule please contact [facility] directly at [phone].  Normal or non-critical lab and imaging results will be communicated to you by MyChart, letter or phone within 4 business days after the clinic has received the results. If you do not hear from us within 7 days, please contact the clinic through MyChart or phone. If you have a critical or abnormal lab result, we will notify you by phone as soon as possible.  Submit refill requests through righTune or call your pharmacy and they will forward the refill request to us. Please allow 3 business days for your refill to be completed.          Additional Information About Your Visit        MyChart Information     righTune gives you secure access to your electronic health record. If you see a primary care provider, you can also send messages to your care team and make appointments. If you have questions, please call your primary care clinic.  If you do not have a primary care provider, please call [phone] and they will assist you.        Care EveryWhere ID     This is your Care EveryWhere ID. This could be used by other organizations to access your [facility] medical records  [id number]        Your Vitals Were      Last Period                   06/11/2016            Blood Pressure from Last 3 Encounters:   11/07/17 104/66   05/15/17 108/64   03/30/17 126/79    Weight from Last 3 Encounters:   11/07/17 56.2 kg (124 lb)   05/15/17 56.7 kg (125 lb)   03/30/17 55.3 kg (122 lb)              Today, you had the following     No orders found for display       Primary Care Provider Office Phone # Fax #    Brooklynn Leyva [phone] [address]  [facility] [address]        Equal Access to Services     [facility]: Hadii mat lua hadasho Somanda, waaxda khurramadaha, qaybta kaalmada carlos, ania kahn . So [facility] [phone].    ATENCIÓN: Si habla español, tiene a orona disposición servicios gratuitos de asistencia lingüística. [facility] [phone].    We comply with applicable federal civil rights laws and [place] laws. We do not discriminate on the basis of race, color, national origin, age, disability, sex, sexual orientation, or gender identity.            Thank you!     Thank you for choosing [facility]  for your care. Our goal is always to provide you with excellent care. Hearing back from our patients is one way we can continue to improve our services. Please take a few minutes to complete the written survey that you may receive in the mail after your visit with us. Thank you!             Your Updated Medication List - Protect others around you: Learn how to safely use, store and throw away your medicines at www.disposemymeds.org.          This list is accurate as of: 12/18/17 12:56 PM.  Always use your most recent med list.                   Brand Name Dispense Instructions for use Diagnosis    escitalopram 10 MG tablet    LEXAPRO    90 tablet    Take 1 tablet (10 mg) by mouth daily    WALLACE (generalized anxiety disorder), Mild episode of recurrent major depressive disorder (H)       hydrOXYzine 25 MG tablet    ATARAX    60 tablet    Take 1-2 tablets (25-50  mg) by mouth every 6 hours as needed for anxiety (or take at bedtime for insomnia)    WALLACE (generalized anxiety disorder), Insomnia, unspecified type       ibuprofen 600 MG tablet    ADVIL/MOTRIN    30 tablet    Take 1 tablet (600 mg) by mouth every 6 hours as needed for pain (mild)    Post-op pain       MULTIVITAMIN ADULT PO           VITAMIN D (CHOLECALCIFEROL) PO      Take by mouth daily

## 2017-12-19 ASSESSMENT — ANXIETY QUESTIONNAIRES: GAD7 TOTAL SCORE: 6

## 2017-12-19 ASSESSMENT — PATIENT HEALTH QUESTIONNAIRE - PHQ9: SUM OF ALL RESPONSES TO PHQ QUESTIONS 1-9: 9

## 2018-01-03 ENCOUNTER — OFFICE VISIT (OUTPATIENT)
Dept: BEHAVIORAL HEALTH | Facility: CLINIC | Age: 53
End: 2018-01-03
Payer: COMMERCIAL

## 2018-01-03 DIAGNOSIS — F41.1 GAD (GENERALIZED ANXIETY DISORDER): Primary | ICD-10-CM

## 2018-01-03 PROCEDURE — 90837 PSYTX W PT 60 MINUTES: CPT | Performed by: SOCIAL WORKER

## 2018-01-03 ASSESSMENT — PATIENT HEALTH QUESTIONNAIRE - PHQ9
SUM OF ALL RESPONSES TO PHQ QUESTIONS 1-9: 7
SUM OF ALL RESPONSES TO PHQ QUESTIONS 1-9: 7

## 2018-01-03 ASSESSMENT — ANXIETY QUESTIONNAIRES
GAD7 TOTAL SCORE: 5
2. NOT BEING ABLE TO STOP OR CONTROL WORRYING: SEVERAL DAYS
6. BECOMING EASILY ANNOYED OR IRRITABLE: SEVERAL DAYS
5. BEING SO RESTLESS THAT IT IS HARD TO SIT STILL: NOT AT ALL
4. TROUBLE RELAXING: NOT AT ALL
7. FEELING AFRAID AS IF SOMETHING AWFUL MIGHT HAPPEN: SEVERAL DAYS
3. WORRYING TOO MUCH ABOUT DIFFERENT THINGS: SEVERAL DAYS
7. FEELING AFRAID AS IF SOMETHING AWFUL MIGHT HAPPEN: SEVERAL DAYS
GAD7 TOTAL SCORE: 5
1. FEELING NERVOUS, ANXIOUS, OR ON EDGE: SEVERAL DAYS
GAD7 TOTAL SCORE: 5

## 2018-01-03 NOTE — MR AVS SNAPSHOT
After Visit Summary   1/3/2018    Aliyah Askew    MRN: [id number]           Patient Information     Date Of Birth          1965        Visit Information        Provider Department      1/3/2018 9:30 AM Henry Edwards [facility]        Today's Diagnoses     WALLACE (generalized anxiety disorder)    -  1       Follow-ups after your visit        Your next 10 appointments already scheduled     Jan 11, 2018 10:30 AM CST   [facility] Visit with KAYLEE Rodriguez   [facility]    [address] [place] [id number] [phone]              Who to contact     If you have questions or need follow up information about today's clinic visit or your schedule please contact [facility] directly at [phone].  Normal or non-critical lab and imaging results will be communicated to you by MyChart, letter or phone within 4 business days after the clinic has received the results. If you do not hear from us within 7 days, please contact the clinic through MyChart or phone. If you have a critical or abnormal lab result, we will notify you by phone as soon as possible.  Submit refill requests through TeamVisibility or call your pharmacy and they will forward the refill request to us. Please allow 3 business days for your refill to be completed.          Additional Information About Your Visit        MyChart Information     TeamVisibility gives you secure access to your electronic health record. If you see a primary care provider, you can also send messages to your care team and make appointments. If you have questions, please call your primary care clinic.  If you do not have a primary care provider, please call [phone] and they will assist you.        Care EveryWhere ID     This is your Care EveryWhere ID. This could be used by other organizations to access your [facility] medical records  [id number]        Your Vitals Were      Last Period                   06/11/2016            Blood Pressure from Last 3 Encounters:   11/07/17 104/66   05/15/17 108/64   03/30/17 126/79    Weight from Last 3 Encounters:   11/07/17 56.2 kg (124 lb)   05/15/17 56.7 kg (125 lb)   03/30/17 55.3 kg (122 lb)              Today, you had the following     No orders found for display       Primary Care Provider Office Phone # Fax #    Brooklynn Leyva [phone] [address] [facility] [address]        Equal Access to Services     [facility]: Hadii mat lua hadashgilbert Somanda, waaxda norah, qaybta kaalmabailey gonzalez, ania kahn . So [facility] [phone].    ATENCIÓN: Si habla español, tiene a orona disposición servicios gratuitos de asistencia lingüística. [facility] [phone].    We comply with applicable federal civil rights laws and [place] laws. We do not discriminate on the basis of race, color, national origin, age, disability, sex, sexual orientation, or gender identity.            Thank you!     Thank you for choosing [facility]  for your care. Our goal is always to provide you with excellent care. Hearing back from our patients is one way we can continue to improve our services. Please take a few minutes to complete the written survey that you may receive in the mail after your visit with us. Thank you!             Your Updated Medication List - Protect others around you: Learn how to safely use, store and throw away your medicines at www.disposemymeds.org.          This list is accurate as of: 1/3/18 11:59 PM.  Always use your most recent med list.                   Brand Name Dispense Instructions for use Diagnosis    escitalopram 10 MG tablet    LEXAPRO    90 tablet    Take 1 tablet (10 mg) by mouth daily    WALLACE (generalized anxiety disorder), Mild episode of recurrent major depressive disorder (H)       hydrOXYzine 25 MG tablet    ATARAX    60 tablet    Take 1-2 tablets (25-50 mg)  by mouth every 6 hours as needed for anxiety (or take at bedtime for insomnia)    WALLACE (generalized anxiety disorder), Insomnia, unspecified type       ibuprofen 600 MG tablet    ADVIL/MOTRIN    30 tablet    Take 1 tablet (600 mg) by mouth every 6 hours as needed for pain (mild)    Post-op pain       MULTIVITAMIN ADULT PO           VITAMIN D (CHOLECALCIFEROL) PO      Take by mouth daily

## 2018-01-04 ASSESSMENT — ANXIETY QUESTIONNAIRES: GAD7 TOTAL SCORE: 5

## 2018-01-04 ASSESSMENT — PATIENT HEALTH QUESTIONNAIRE - PHQ9: SUM OF ALL RESPONSES TO PHQ QUESTIONS 1-9: 7
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
## 2018-01-09 ENCOUNTER — TELEPHONE (OUTPATIENT)
Dept: FAMILY MEDICINE | Facility: CLINIC | Age: 53
End: 2018-01-09
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
## 2018-01-09 NOTE — TELEPHONE ENCOUNTER
Called patient at provider's request to triage for appointment scheduled tomorrow  Reason for appointment is listed as collar bone joint, swollen and painful    Per patient she called last Friday to make appointment and things have actually improved since initial appointment was made.  Denies area being red or warm to the touch  Does not recall a recent fall or trauma to the area  Says she has noticed that the two sides do not look symmetrical.    Patient will keep appointment as scheduled for tomorrow.  Also needs a note for her employer as she has been out with the flu last week.  Leandra Wilson RN

## 2018-01-10 ENCOUNTER — OFFICE VISIT (OUTPATIENT)
Dept: FAMILY MEDICINE | Facility: CLINIC | Age: 53
End: 2018-01-10
Payer: COMMERCIAL

## 2018-01-10 ENCOUNTER — RADIANT APPOINTMENT (OUTPATIENT)
Dept: GENERAL RADIOLOGY | Facility: CLINIC | Age: 53
End: 2018-01-10
Attending: FAMILY MEDICINE
Payer: COMMERCIAL

## 2018-01-10 VITALS
HEART RATE: 60 BPM | SYSTOLIC BLOOD PRESSURE: 109 MMHG | BODY MASS INDEX: 23.04 KG/M2 | WEIGHT: 130 LBS | HEIGHT: 63 IN | OXYGEN SATURATION: 98 % | DIASTOLIC BLOOD PRESSURE: 70 MMHG | TEMPERATURE: 97.9 F | RESPIRATION RATE: 12 BRPM

## 2018-01-10 DIAGNOSIS — J11.1 INFLUENZA-LIKE ILLNESS: ICD-10-CM

## 2018-01-10 DIAGNOSIS — M89.8X1 PAIN OF RIGHT CLAVICLE: ICD-10-CM

## 2018-01-10 DIAGNOSIS — M89.8X1 PAIN OF RIGHT CLAVICLE: Primary | ICD-10-CM

## 2018-01-10 PROCEDURE — 99213 OFFICE O/P EST LOW 20 MIN: CPT | Performed by: FAMILY MEDICINE

## 2018-01-10 PROCEDURE — 73000 X-RAY EXAM OF COLLAR BONE: CPT | Mod: RT

## 2018-01-10 NOTE — MR AVS SNAPSHOT
After Visit Summary   1/10/2018    Aliyah Askew    MRN: [id number]           Patient Information     Date Of Birth          1965        Visit Information        Provider Department      1/10/2018 9:40 AM Brooklynn Leyva MD [facility]        Today's Diagnoses     Pain of right clavicle    -  1    Influenza-like illness          Care Instructions    Schedule with sports medicine if the pain continues over the next week. If not, you can cancel the visit. At any time, if you would like physical therapy referral.           Follow-ups after your visit        Additional Services     ORTHO  REFERRAL       [facility] is referring you to the Orthopedic  Services at [facility].       The  Representative will assist you in the coordination of your Orthopedic and Musculoskeletal Care as prescribed by your physician.    The  Representative will call you within 1 business day to help schedule your appointment, or you may contact the  Representative at:    All areas ~ [phone]     Type of Referral : Non Surgical       Timeframe requested: Routine    Coverage of these services is subject to the terms and limitations of your health insurance plan.  Please call member services at your health plan with any benefit or coverage questions.      If X-rays, CT or MRI's have been performed, please contact the facility where they were done to arrange for , prior to your scheduled appointment.  Please bring this referral request to your appointment and present it to your specialist.                  Your next 10 appointments already scheduled     Jan 11, 2018 10:30 AM CST   Return Visit with Henry Edwards, [facility] ([facility])    [address] [place] [address] [phone]              Who to contact     If you have questions or need follow up  "information about today's clinic visit or your schedule please contact [facility] directly at [phone].  Normal or non-critical lab and imaging results will be communicated to you by MyChart, letter or phone within 4 business days after the clinic has received the results. If you do not hear from us within 7 days, please contact the clinic through [email or web address] or phone. If you have a critical or abnormal lab result, we will notify you by phone as soon as possible.  Submit refill requests through [email or web address] or call your pharmacy and they will forward the refill request to us. Please allow 3 business days for your refill to be completed.          Additional Information About Your Visit        [email or web address] Information     [email or web address] gives you secure access to your electronic health record. If you see a primary care provider, you can also send messages to your care team and make appointments. If you have questions, please call your primary care clinic.  If you do not have a primary care provider, please call [phone] and they will assist you.        Care EveryWhere ID     This is your Care EveryWhere ID. This could be used by other organizations to access your [facility] medical records  [id number]        Your Vitals Were     Pulse Temperature Respirations Height Last Period Pulse Oximetry    60 97.9  F (36.6  C) (Oral) 12 5' 2.75\" (1.594 m) 06/11/2016 98%    BMI (Body Mass Index)                   23.21 kg/m2            Blood Pressure from Last 3 Encounters:   01/10/18 109/70   11/07/17 104/66   05/15/17 108/64    Weight from Last 3 Encounters:   01/10/18 130 lb (59 kg)   11/07/17 124 lb (56.2 kg)   05/15/17 125 lb (56.7 kg)              We Performed the Following     ORTHO  REFERRAL        Primary Care Provider Office Phone # Fax #    Brooklynn Leyva [phone] [address] [facility] [address]        Equal Access to Services     KRYSTAL RENDON AH: Godwin Mae, " jeannie almazan, qaybta kavargas gonzalez, ania canseco sulemancornel evonnetavo laJadaaamartha ah. So [facility] [phone].    ATENCIÓN: Si edgard dalal, tiene a orona disposición servicios gratuitos de asistencia lingüística. Bryan al [phone].    We comply with applicable federal civil rights laws and [place] laws. We do not discriminate on the basis of race, color, national origin, age, disability, sex, sexual orientation, or gender identity.            Thank you!     Thank you for choosing [facility]  for your care. Our goal is always to provide you with excellent care. Hearing back from our patients is one way we can continue to improve our services. Please take a few minutes to complete the written survey that you may receive in the mail after your visit with us. Thank you!             Your Updated Medication List - Protect others around you: Learn how to safely use, store and throw away your medicines at www.disposemymeds.org.          This list is accurate as of: 1/10/18 11:09 AM.  Always use your most recent med list.                   Brand Name Dispense Instructions for use Diagnosis    escitalopram 10 MG tablet    LEXAPRO    90 tablet    Take 1 tablet (10 mg) by mouth daily    WALLACE (generalized anxiety disorder), Mild episode of recurrent major depressive disorder (H)       hydrOXYzine 25 MG tablet    ATARAX    60 tablet    Take 1-2 tablets (25-50 mg) by mouth every 6 hours as needed for anxiety (or take at bedtime for insomnia)    WALLACE (generalized anxiety disorder), Insomnia, unspecified type       ibuprofen 600 MG tablet    ADVIL/MOTRIN    30 tablet    Take 1 tablet (600 mg) by mouth every 6 hours as needed for pain (mild)    Post-op pain       MULTIVITAMIN ADULT PO           VITAMIN D (CHOLECALCIFEROL) PO      Take by mouth daily

## 2018-01-10 NOTE — PATIENT INSTRUCTIONS
Schedule with sports medicine if the pain continues over the next week. If not, you can cancel the visit. At any time, if you would like physical therapy referral.

## 2018-01-10 NOTE — LETTER
[facility]  [address] [facility] [address]  Phone: [phone]    January 10, 2018        Aliyah Askew  [address] [facility] [address]          To whom it may concern:    RE: Aliyah Granger was seen and treated today at our clinic and missed work due to illness. She had influenza-like symptoms with a fever. She may return to work now that she is fever-free for 24 hours.     Please contact me for questions or concerns.      Sincerely,        Brooklynn Leyva MD

## 2018-01-11 ENCOUNTER — OFFICE VISIT (OUTPATIENT)
Dept: BEHAVIORAL HEALTH | Facility: CLINIC | Age: 53
End: 2018-01-11
Payer: COMMERCIAL

## 2018-01-11 DIAGNOSIS — F33.1 MODERATE EPISODE OF RECURRENT MAJOR DEPRESSIVE DISORDER (H): Primary | ICD-10-CM

## 2018-01-11 PROCEDURE — 90837 PSYTX W PT 60 MINUTES: CPT | Performed by: SOCIAL WORKER

## 2018-01-11 NOTE — MR AVS SNAPSHOT
After Visit Summary   1/11/2018    Aliyah Askew    MRN: [id number]           Patient Information     Date Of Birth          1965        Visit Information        Provider Department      1/11/2018 10:30 AM Henry Edwards, [facility]        Today's Diagnoses     Moderate episode of recurrent major depressive disorder (H)    -  1       Follow-ups after your visit        Your next 10 appointments already scheduled     Jan 24, 2018 10:00 AM CST   [facility]    [address] [place] [address] [phone]              Who to contact     If you have questions or need follow up information about today's clinic visit or your schedule please contact [facility] directly at [phone].  Normal or non-critical lab and imaging results will be communicated to you by MyChart, letter or phone within 4 business days after the clinic has received the results. If you do not hear from us within 7 days, please contact the clinic through HeyLetshart or phone. If you have a critical or abnormal lab result, we will notify you by phone as soon as possible.  Submit refill requests through MyPublisher or call your pharmacy and they will forward the refill request to us. Please allow 3 business days for your refill to be completed.          Additional Information About Your Visit        MyChart Information     MyPublisher gives you secure access to your electronic health record. If you see a primary care provider, you can also send messages to your care team and make appointments. If you have questions, please call your primary care clinic.  If you do not have a primary care provider, please call [phone] and they will assist you.        Care EveryWhere ID     This is your Care EveryWhere ID. This could be used by other organizations to access your [facility]  records  [id number]        Your Vitals Were     Last Period                   06/11/2016            Blood Pressure from Last 3 Encounters:   01/10/18 109/70   11/07/17 104/66   05/15/17 108/64    Weight from Last 3 Encounters:   01/10/18 59 kg (130 lb)   11/07/17 56.2 kg (124 lb)   05/15/17 56.7 kg (125 lb)              Today, you had the following     No orders found for display       Primary Care Provider Office Phone # Fax #    Brooklynn Leyva [phone] [address]  [facility] [address]        Equal Access to Services     [facility]: Hadii mat lua hadefrem Mae, wavarshada norah, qaybta kaalmada carlos, ania kahn . So [facility] [phone].    ATENCIÓN: Si habla español, tiene a orona disposición servicios gratuitos de asistencia lingüística. [facility] [phone].    We comply with applicable federal civil rights laws and [place] laws. We do not discriminate on the basis of race, color, national origin, age, disability, sex, sexual orientation, or gender identity.            Thank you!     Thank you for choosing [facility]  for your care. Our goal is always to provide you with excellent care. Hearing back from our patients is one way we can continue to improve our services. Please take a few minutes to complete the written survey that you may receive in the mail after your visit with us. Thank you!             Your Updated Medication List - Protect others around you: Learn how to safely use, store and throw away your medicines at www.disposemymeds.org.          This list is accurate as of: 1/11/18 11:59 PM.  Always use your most recent med list.                   Brand Name Dispense Instructions for use Diagnosis    escitalopram 10 MG tablet    LEXAPRO    90 tablet    Take 1 tablet (10 mg) by mouth daily    WALLACE (generalized anxiety disorder), Mild episode of recurrent major depressive disorder (H)       hydrOXYzine 25 MG tablet     ATARAX    60 tablet    Take 1-2 tablets (25-50 mg) by mouth every 6 hours as needed for anxiety (or take at bedtime for insomnia)    WALLACE (generalized anxiety disorder), Insomnia, unspecified type       ibuprofen 600 MG tablet    ADVIL/MOTRIN    30 tablet    Take 1 tablet (600 mg) by mouth every 6 hours as needed for pain (mild)    Post-op pain       MULTIVITAMIN ADULT PO           VITAMIN D (CHOLECALCIFEROL) PO      Take by mouth daily
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
## 2018-01-16 ASSESSMENT — ANXIETY QUESTIONNAIRES
6. BECOMING EASILY ANNOYED OR IRRITABLE: MORE THAN HALF THE DAYS
GAD7 TOTAL SCORE: 9
IF YOU CHECKED OFF ANY PROBLEMS ON THIS QUESTIONNAIRE, HOW DIFFICULT HAVE THESE PROBLEMS MADE IT FOR YOU TO DO YOUR WORK, TAKE CARE OF THINGS AT HOME, OR GET ALONG WITH OTHER PEOPLE: SOMEWHAT DIFFICULT
3. WORRYING TOO MUCH ABOUT DIFFERENT THINGS: SEVERAL DAYS
2. NOT BEING ABLE TO STOP OR CONTROL WORRYING: MORE THAN HALF THE DAYS
5. BEING SO RESTLESS THAT IT IS HARD TO SIT STILL: SEVERAL DAYS
7. FEELING AFRAID AS IF SOMETHING AWFUL MIGHT HAPPEN: SEVERAL DAYS
1. FEELING NERVOUS, ANXIOUS, OR ON EDGE: SEVERAL DAYS

## 2018-01-16 ASSESSMENT — PATIENT HEALTH QUESTIONNAIRE - PHQ9
SUM OF ALL RESPONSES TO PHQ QUESTIONS 1-9: 12
5. POOR APPETITE OR OVEREATING: SEVERAL DAYS

## 2018-01-17 ASSESSMENT — ANXIETY QUESTIONNAIRES: GAD7 TOTAL SCORE: 9

## 2018-01-24 ENCOUNTER — OFFICE VISIT (OUTPATIENT)
Dept: BEHAVIORAL HEALTH | Facility: CLINIC | Age: 53
End: 2018-01-24
Payer: COMMERCIAL

## 2018-01-24 DIAGNOSIS — F41.1 GAD (GENERALIZED ANXIETY DISORDER): Primary | ICD-10-CM

## 2018-01-24 PROCEDURE — 90837 PSYTX W PT 60 MINUTES: CPT | Performed by: SOCIAL WORKER

## 2018-01-24 ASSESSMENT — ANXIETY QUESTIONNAIRES
1. FEELING NERVOUS, ANXIOUS, OR ON EDGE: SEVERAL DAYS
6. BECOMING EASILY ANNOYED OR IRRITABLE: SEVERAL DAYS
2. NOT BEING ABLE TO STOP OR CONTROL WORRYING: SEVERAL DAYS
3. WORRYING TOO MUCH ABOUT DIFFERENT THINGS: SEVERAL DAYS
GAD7 TOTAL SCORE: 6
7. FEELING AFRAID AS IF SOMETHING AWFUL MIGHT HAPPEN: SEVERAL DAYS
7. FEELING AFRAID AS IF SOMETHING AWFUL MIGHT HAPPEN: SEVERAL DAYS
GAD7 TOTAL SCORE: 6
GAD7 TOTAL SCORE: 6
5. BEING SO RESTLESS THAT IT IS HARD TO SIT STILL: NOT AT ALL
4. TROUBLE RELAXING: SEVERAL DAYS

## 2018-01-24 ASSESSMENT — PATIENT HEALTH QUESTIONNAIRE - PHQ9
SUM OF ALL RESPONSES TO PHQ QUESTIONS 1-9: 8
10. IF YOU CHECKED OFF ANY PROBLEMS, HOW DIFFICULT HAVE THESE PROBLEMS MADE IT FOR YOU TO DO YOUR WORK, TAKE CARE OF THINGS AT HOME, OR GET ALONG WITH OTHER PEOPLE: SOMEWHAT DIFFICULT
SUM OF ALL RESPONSES TO PHQ QUESTIONS 1-9: 8

## 2018-01-24 NOTE — MR AVS SNAPSHOT
After Visit Summary   1/24/2018    Aliyah Askew    MRN: [id number]           Patient Information     Date Of Birth          1965        Visit Information        Provider Department      1/24/2018 10:00 AM [facility]        Today's Diagnoses     WALLACE (generalized anxiety disorder)    -  1       Follow-ups after your visit        Your next 10 appointments already scheduled     Feb 02, 2018  4:00 PM CST   Return Visit with KAYLEE Rodriguez   [facility] ([facility])    [address] [place] [id number] [phone]              Who to contact     If you have questions or need follow up information about today's clinic visit or your schedule please contact [facility] directly at [phone].  Normal or non-critical lab and imaging results will be communicated to you by MyChart, letter or phone within 4 business days after the clinic has received the results. If you do not hear from us within 7 days, please contact the clinic through MyChart or phone. If you have a critical or abnormal lab result, we will notify you by phone as soon as possible.  Submit refill requests through [employer] or call your pharmacy and they will forward the refill request to us. Please allow 3 business days for your refill to be completed.          Additional Information About Your Visit        MyChart Information     [employer] gives you secure access to your electronic health record. If you see a primary care provider, you can also send messages to your care team and make appointments. If you have questions, please call your primary care clinic.  If you do not have a primary care provider, please call [phone] and they will assist you.        Care EveryWhere ID     This is your Care EveryWhere ID. This could be used by other organizations to access your [facility] medical records  [id number]        Your Vitals Were      Last Period                   06/11/2016            Blood Pressure from Last 3 Encounters:   01/10/18 109/70   11/07/17 104/66   05/15/17 108/64    Weight from Last 3 Encounters:   01/10/18 59 kg (130 lb)   11/07/17 56.2 kg (124 lb)   05/15/17 56.7 kg (125 lb)              Today, you had the following     No orders found for display       Primary Care Provider Office Phone # Fax #    Brooklynn Leyva [phone] [address]  [facility] [address]        Equal Access to Services     [facility]: Hadii mat ku hadasho Soomaali, waaxda luqadaha, qaybta kaalmada carlos, ania kahn . So [facility] [phone].    ATENCIÓN: Si habla español, tiene a orona disposición servicios gratuitos de asistencia lingüística. [facility] [phone].    We comply with applicable federal civil rights laws and [place] laws. We do not discriminate on the basis of race, color, national origin, age, disability, sex, sexual orientation, or gender identity.            Thank you!     Thank you for choosing [facility]  for your care. Our goal is always to provide you with excellent care. Hearing back from our patients is one way we can continue to improve our services. Please take a few minutes to complete the written survey that you may receive in the mail after your visit with us. Thank you!             Your Updated Medication List - Protect others around you: Learn how to safely use, store and throw away your medicines at www.disposemymeds.org.          This list is accurate as of 1/24/18  4:15 PM.  Always use your most recent med list.                   Brand Name Dispense Instructions for use Diagnosis    escitalopram 10 MG tablet    LEXAPRO    90 tablet    Take 1 tablet (10 mg) by mouth daily    WALLACE (generalized anxiety disorder), Mild episode of recurrent major depressive disorder (H)       hydrOXYzine 25 MG tablet    ATARAX    60 tablet    Take 1-2 tablets (25-50 mg)  by mouth every 6 hours as needed for anxiety (or take at bedtime for insomnia)    WALLACE (generalized anxiety disorder), Insomnia, unspecified type       ibuprofen 600 MG tablet    ADVIL/MOTRIN    30 tablet    Take 1 tablet (600 mg) by mouth every 6 hours as needed for pain (mild)    Post-op pain       MULTIVITAMIN ADULT PO           VITAMIN D (CHOLECALCIFEROL) PO      Take by mouth daily
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
## 2018-01-25 ASSESSMENT — PATIENT HEALTH QUESTIONNAIRE - PHQ9: SUM OF ALL RESPONSES TO PHQ QUESTIONS 1-9: 8

## 2018-01-25 ASSESSMENT — ANXIETY QUESTIONNAIRES: GAD7 TOTAL SCORE: 6

## 2018-02-05 ENCOUNTER — OFFICE VISIT (OUTPATIENT)
Dept: BEHAVIORAL HEALTH | Facility: CLINIC | Age: 53
End: 2018-02-05
Payer: COMMERCIAL

## 2018-02-05 DIAGNOSIS — F41.1 GAD (GENERALIZED ANXIETY DISORDER): Primary | ICD-10-CM

## 2018-02-05 PROCEDURE — 90837 PSYTX W PT 60 MINUTES: CPT | Performed by: SOCIAL WORKER

## 2018-02-05 ASSESSMENT — PATIENT HEALTH QUESTIONNAIRE - PHQ9
10. IF YOU CHECKED OFF ANY PROBLEMS, HOW DIFFICULT HAVE THESE PROBLEMS MADE IT FOR YOU TO DO YOUR WORK, TAKE CARE OF THINGS AT HOME, OR GET ALONG WITH OTHER PEOPLE: SOMEWHAT DIFFICULT
SUM OF ALL RESPONSES TO PHQ QUESTIONS 1-9: 9
SUM OF ALL RESPONSES TO PHQ QUESTIONS 1-9: 9
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
## 2018-02-05 NOTE — MR AVS SNAPSHOT
After Visit Summary   2/5/2018    Aliyah Askew    MRN: [id number]           Patient Information     Date Of Birth          1965        Visit Information        Provider Department      2/5/2018 10:00 AM Henry Edwards, [facility]        Today's Diagnoses     WALLACE (generalized anxiety disorder)    -  1       Follow-ups after your visit        Who to contact     If you have questions or need follow up information about today's clinic visit or your schedule please contact [facility] directly at [phone].  Normal or non-critical lab and imaging results will be communicated to you by kontoblickhart, letter or phone within 4 business days after the clinic has received the results. If you do not hear from us within 7 days, please contact the clinic through MVP Vaultt or phone. If you have a critical or abnormal lab result, we will notify you by phone as soon as possible.  Submit refill requests through [email or web address] or call your pharmacy and they will forward the refill request to us. Please allow 3 business days for your refill to be completed.          Additional Information About Your Visit        MyChart Information     [email or web address] gives you secure access to your electronic health record. If you see a primary care provider, you can also send messages to your care team and make appointments. If you have questions, please call your primary care clinic.  If you do not have a primary care provider, please call [phone] and they will assist you.        Care EveryWhere ID     This is your Care EveryWhere ID. This could be used by other organizations to access your [facility] medical records  [id number]        Your Vitals Were     Last Period                   06/11/2016            Blood Pressure from Last 3 Encounters:   01/10/18 109/70   11/07/17 104/66   05/15/17 108/64    Weight from Last 3 Encounters:   01/10/18 59 kg (130 lb)   11/07/17 56.2 kg (124 lb)   05/15/17 56.7 kg  (125 lb)              Today, you had the following     No orders found for display       Primary Care Provider Office Phone # Fax #    Brooklynn Leyva [phone] [address]  [facility] [address]        Equal Access to Services     BRADLEYRHIANNON JUSTICE : Hadii aad ku hadmagoo Soomaali, waaxda luqadaha, qaybta kaalmada adeegyada, ania idiin hayalinen adecornel robledo criss . So [facility] [phone].    ATENCIÓN: Si habla español, tiene a orona disposición servicios gratuitos de asistencia lingüística. Llame al [phone].    We comply with applicable federal civil rights laws and [place] laws. We do not discriminate on the basis of race, color, national origin, age, disability, sex, sexual orientation, or gender identity.            Thank you!     Thank you for choosing [facility]  for your care. Our goal is always to provide you with excellent care. Hearing back from our patients is one way we can continue to improve our services. Please take a few minutes to complete the written survey that you may receive in the mail after your visit with us. Thank you!             Your Updated Medication List - Protect others around you: Learn how to safely use, store and throw away your medicines at www.disposemymeds.org.          This list is accurate as of 2/5/18 12:20 PM.  Always use your most recent med list.                   Brand Name Dispense Instructions for use Diagnosis    escitalopram 10 MG tablet    LEXAPRO    90 tablet    Take 1 tablet (10 mg) by mouth daily    WALLACE (generalized anxiety disorder), Mild episode of recurrent major depressive disorder (H)       hydrOXYzine 25 MG tablet    ATARAX    60 tablet    Take 1-2 tablets (25-50 mg) by mouth every 6 hours as needed for anxiety (or take at bedtime for insomnia)    WALLACE (generalized anxiety disorder), Insomnia, unspecified type       ibuprofen 600 MG tablet    ADVIL/MOTRIN    30 tablet    Take 1 tablet (600 mg) by mouth every 6 hours as  needed for pain (mild)    Post-op pain       MULTIVITAMIN ADULT PO           VITAMIN D (CHOLECALCIFEROL) PO      Take by mouth daily

## 2018-02-06 ASSESSMENT — PATIENT HEALTH QUESTIONNAIRE - PHQ9: SUM OF ALL RESPONSES TO PHQ QUESTIONS 1-9: 9

## 2018-02-14 ENCOUNTER — OFFICE VISIT (OUTPATIENT)
Dept: BEHAVIORAL HEALTH | Facility: CLINIC | Age: 53
End: 2018-02-14
Payer: COMMERCIAL

## 2018-02-14 DIAGNOSIS — F33.1 MODERATE EPISODE OF RECURRENT MAJOR DEPRESSIVE DISORDER (H): Primary | ICD-10-CM

## 2018-02-14 PROCEDURE — 90837 PSYTX W PT 60 MINUTES: CPT | Performed by: SOCIAL WORKER

## 2018-02-14 ASSESSMENT — PATIENT HEALTH QUESTIONNAIRE - PHQ9
SUM OF ALL RESPONSES TO PHQ QUESTIONS 1-9: 14
SUM OF ALL RESPONSES TO PHQ QUESTIONS 1-9: 14

## 2018-02-14 ASSESSMENT — ANXIETY QUESTIONNAIRES
GAD7 TOTAL SCORE: 10
1. FEELING NERVOUS, ANXIOUS, OR ON EDGE: MORE THAN HALF THE DAYS
3. WORRYING TOO MUCH ABOUT DIFFERENT THINGS: SEVERAL DAYS
6. BECOMING EASILY ANNOYED OR IRRITABLE: SEVERAL DAYS
GAD7 TOTAL SCORE: 10
2. NOT BEING ABLE TO STOP OR CONTROL WORRYING: MORE THAN HALF THE DAYS
GAD7 TOTAL SCORE: 10
7. FEELING AFRAID AS IF SOMETHING AWFUL MIGHT HAPPEN: SEVERAL DAYS
5. BEING SO RESTLESS THAT IT IS HARD TO SIT STILL: SEVERAL DAYS
7. FEELING AFRAID AS IF SOMETHING AWFUL MIGHT HAPPEN: SEVERAL DAYS
4. TROUBLE RELAXING: MORE THAN HALF THE DAYS

## 2018-02-15 ASSESSMENT — ANXIETY QUESTIONNAIRES: GAD7 TOTAL SCORE: 10

## 2018-02-15 ASSESSMENT — PATIENT HEALTH QUESTIONNAIRE - PHQ9: SUM OF ALL RESPONSES TO PHQ QUESTIONS 1-9: 14

## 2018-02-21 ENCOUNTER — OFFICE VISIT (OUTPATIENT)
Dept: BEHAVIORAL HEALTH | Facility: CLINIC | Age: 53
End: 2018-02-21
Payer: COMMERCIAL

## 2018-02-21 DIAGNOSIS — F33.2 SEVERE EPISODE OF RECURRENT MAJOR DEPRESSIVE DISORDER, WITHOUT PSYCHOTIC FEATURES (H): Primary | ICD-10-CM

## 2018-02-21 PROCEDURE — 90837 PSYTX W PT 60 MINUTES: CPT | Performed by: SOCIAL WORKER

## 2018-02-21 ASSESSMENT — PATIENT HEALTH QUESTIONNAIRE - PHQ9
SUM OF ALL RESPONSES TO PHQ QUESTIONS 1-9: 8
SUM OF ALL RESPONSES TO PHQ QUESTIONS 1-9: 8

## 2018-02-21 NOTE — MR AVS SNAPSHOT
After Visit Summary   2/21/2018    Aliyah Askew    MRN: [id number]           Patient Information     Date Of Birth          1965        Visit Information        Provider Department      2/21/2018 4:00 PM Henry Edwards [facility]        Today's Diagnoses     Severe episode of recurrent major depressive disorder, without psychotic features (H)    -  1       Follow-ups after your visit        Your next 10 appointments already scheduled     Feb 28, 2018  4:00 PM CST   [facility]    [address] [place] [id number] [phone]              Who to contact     If you have questions or need follow up information about today's clinic visit or your schedule please contact [facility] directly at [phone].  Normal or non-critical lab and imaging results will be communicated to you by MyChart, letter or phone within 4 business days after the clinic has received the results. If you do not hear from us within 7 days, please contact the clinic through Zollohart or phone. If you have a critical or abnormal lab result, we will notify you by phone as soon as possible.  Submit refill requests through Share0 or call your pharmacy and they will forward the refill request to us. Please allow 3 business days for your refill to be completed.          Additional Information About Your Visit        MyChart Information     Share0 gives you secure access to your electronic health record. If you see a primary care provider, you can also send messages to your care team and make appointments. If you have questions, please call your primary care clinic.  If you do not have a primary care provider, please call [phone] and they will assist you.        Care EveryWhere ID     This is your Care EveryWhere ID. This could be used by other organizations to access your [facility]  medical records  [id number]        Your Vitals Were     Last Period                   06/11/2016            Blood Pressure from Last 3 Encounters:   01/10/18 109/70   11/07/17 104/66   05/15/17 108/64    Weight from Last 3 Encounters:   01/10/18 59 kg (130 lb)   11/07/17 56.2 kg (124 lb)   05/15/17 56.7 kg (125 lb)              Today, you had the following     No orders found for display       Primary Care Provider Office Phone # Fax #    Brooklynn Leyva [phone] [address]  [facility] [address]        Equal Access to Services     [facility] CenterBELL : Hadii mat lua hadmagoo Carmelita, waaxda norah, qaybta kaalmada carlos, ania kahn . So [facility] [phone].    ATENCIÓN: Si habla español, tiene a roona disposición servicios gratuitos de asistencia lingüística. LlDay[facility] [phone].    We comply with applicable federal civil rights laws and [place] laws. We do not discriminate on the basis of race, color, national origin, age, disability, sex, sexual orientation, or gender identity.            Thank you!     Thank you for choosing [facility]  for your care. Our goal is always to provide you with excellent care. Hearing back from our patients is one way we can continue to improve our services. Please take a few minutes to complete the written survey that you may receive in the mail after your visit with us. Thank you!             Your Updated Medication List - Protect others around you: Learn how to safely use, store and throw away your medicines at www.disposemymeds.org.          This list is accurate as of 2/21/18 11:59 PM.  Always use your most recent med list.                   Brand Name Dispense Instructions for use Diagnosis    escitalopram 10 MG tablet    LEXAPRO    90 tablet    Take 1 tablet (10 mg) by mouth daily    WALLACE (generalized anxiety disorder), Mild episode of recurrent major depressive disorder (H)       hydrOXYzine 25 MG tablet     ATARAX    60 tablet    Take 1-2 tablets (25-50 mg) by mouth every 6 hours as needed for anxiety (or take at bedtime for insomnia)    WALLACE (generalized anxiety disorder), Insomnia, unspecified type       ibuprofen 600 MG tablet    ADVIL/MOTRIN    30 tablet    Take 1 tablet (600 mg) by mouth every 6 hours as needed for pain (mild)    Post-op pain       MULTIVITAMIN ADULT PO           VITAMIN D (CHOLECALCIFEROL) PO      Take by mouth daily

## 2018-02-22 ASSESSMENT — PATIENT HEALTH QUESTIONNAIRE - PHQ9: SUM OF ALL RESPONSES TO PHQ QUESTIONS 1-9: 8

## 2018-02-23 ENCOUNTER — TELEPHONE (OUTPATIENT)
Dept: BEHAVIORAL HEALTH | Facility: CLINIC | Age: 53
End: 2018-02-23

## 2018-02-23 DIAGNOSIS — H83.3X9 SOUND SENSITIVITY, UNSPECIFIED LATERALITY: Primary | ICD-10-CM

## 2018-02-23 NOTE — TELEPHONE ENCOUNTER
Left message with patient that I had spoken to [facility] OT supervisor about her referral.  Provided brief clinical and supervisor felt they had somebody who could help with auditory processing desensitization.  OT Karina Carvajal. [facility] will ask pcp place a referral on Monday.       2/26/18    Spoke with Dr Leyva of referral for OT and of pt who agreed with the referral.
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
## 2018-02-28 ENCOUNTER — OFFICE VISIT (OUTPATIENT)
Dept: BEHAVIORAL HEALTH | Facility: CLINIC | Age: 53
End: 2018-02-28
Payer: COMMERCIAL

## 2018-02-28 DIAGNOSIS — F41.1 GAD (GENERALIZED ANXIETY DISORDER): Primary | ICD-10-CM

## 2018-02-28 PROCEDURE — 90837 PSYTX W PT 60 MINUTES: CPT | Performed by: SOCIAL WORKER

## 2018-02-28 ASSESSMENT — ANXIETY QUESTIONNAIRES
5. BEING SO RESTLESS THAT IT IS HARD TO SIT STILL: SEVERAL DAYS
6. BECOMING EASILY ANNOYED OR IRRITABLE: SEVERAL DAYS
7. FEELING AFRAID AS IF SOMETHING AWFUL MIGHT HAPPEN: SEVERAL DAYS
GAD7 TOTAL SCORE: 7
GAD7 TOTAL SCORE: 7
3. WORRYING TOO MUCH ABOUT DIFFERENT THINGS: SEVERAL DAYS
2. NOT BEING ABLE TO STOP OR CONTROL WORRYING: SEVERAL DAYS
1. FEELING NERVOUS, ANXIOUS, OR ON EDGE: SEVERAL DAYS
7. FEELING AFRAID AS IF SOMETHING AWFUL MIGHT HAPPEN: SEVERAL DAYS
GAD7 TOTAL SCORE: 7
4. TROUBLE RELAXING: SEVERAL DAYS

## 2018-02-28 ASSESSMENT — PATIENT HEALTH QUESTIONNAIRE - PHQ9
SUM OF ALL RESPONSES TO PHQ QUESTIONS 1-9: 8
SUM OF ALL RESPONSES TO PHQ QUESTIONS 1-9: 8

## 2018-02-28 NOTE — MR AVS SNAPSHOT
After Visit Summary   2/28/2018    Aliyah Askew    MRN: [id number]           Patient Information     Date Of Birth          1965        Visit Information        Provider Department      2/28/2018 4:00 PM Henry Edwards, [facility]        Today's Diagnoses     WALLACE (generalized anxiety disorder)    -  1       Follow-ups after your visit        Who to contact     If you have questions or need follow up information about today's clinic visit or your schedule please contact [facility] directly at [phone].  Normal or non-critical lab and imaging results will be communicated to you by Jamplifyhart, letter or phone within 4 business days after the clinic has received the results. If you do not hear from us within 7 days, please contact the clinic through [employer] or phone. If you have a critical or abnormal lab result, we will notify you by phone as soon as possible.  Submit refill requests through [employer] or call your pharmacy and they will forward the refill request to us. Please allow 3 business days for your refill to be completed.          Additional Information About Your Visit        MyChart Information     [employer] gives you secure access to your electronic health record. If you see a primary care provider, you can also send messages to your care team and make appointments. If you have questions, please call your primary care clinic.  If you do not have a primary care provider, please call [phone] and they will assist you.        Care EveryWhere ID     This is your Care EveryWhere ID. This could be used by other organizations to access your [facility] medical records  [id number]        Your Vitals Were     Last Period                   06/11/2016            Blood Pressure from Last 3 Encounters:   01/10/18 109/70   11/07/17 104/66   05/15/17 108/64    Weight from Last 3 Encounters:   01/10/18 59 kg (130 lb)   11/07/17 56.2 kg (124 lb)   05/15/17 56.7 kg  (125 lb)              Today, you had the following     No orders found for display       Primary Care Provider Office Phone # Fax #    Brooklynn Leyva [phone] [address]  [facility] [address]        Equal Access to Services     BRADLEYRHIANNON JUSTICE : Hadii aad ku hadmagoo Soomaali, waaxda luqadaha, qaybta kaalmada adeegyada, ania idiin hayalinen adecornel robledo criss . So [facility] [phone].    ATENCIÓN: Si habla español, tiene a orona disposición servicios gratuitos de asistencia lingüística. Llame al [phone].    We comply with applicable federal civil rights laws and [place] laws. We do not discriminate on the basis of race, color, national origin, age, disability, sex, sexual orientation, or gender identity.            Thank you!     Thank you for choosing [facility]  for your care. Our goal is always to provide you with excellent care. Hearing back from our patients is one way we can continue to improve our services. Please take a few minutes to complete the written survey that you may receive in the mail after your visit with us. Thank you!             Your Updated Medication List - Protect others around you: Learn how to safely use, store and throw away your medicines at www.disposemymeds.org.          This list is accurate as of 2/28/18 11:59 PM.  Always use your most recent med list.                   Brand Name Dispense Instructions for use Diagnosis    escitalopram 10 MG tablet    LEXAPRO    90 tablet    Take 1 tablet (10 mg) by mouth daily    WALLACE (generalized anxiety disorder), Mild episode of recurrent major depressive disorder (H)       hydrOXYzine 25 MG tablet    ATARAX    60 tablet    Take 1-2 tablets (25-50 mg) by mouth every 6 hours as needed for anxiety (or take at bedtime for insomnia)    WALLACE (generalized anxiety disorder), Insomnia, unspecified type       ibuprofen 600 MG tablet    ADVIL/MOTRIN    30 tablet    Take 1 tablet (600 mg) by mouth every 6 hours as  needed for pain (mild)    Post-op pain       MULTIVITAMIN ADULT PO           VITAMIN D (CHOLECALCIFEROL) PO      Take by mouth daily

## 2018-03-01 ASSESSMENT — PATIENT HEALTH QUESTIONNAIRE - PHQ9: SUM OF ALL RESPONSES TO PHQ QUESTIONS 1-9: 8

## 2018-03-01 ASSESSMENT — ANXIETY QUESTIONNAIRES: GAD7 TOTAL SCORE: 7
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
## 2018-03-12 ENCOUNTER — OFFICE VISIT (OUTPATIENT)
Dept: BEHAVIORAL HEALTH | Facility: CLINIC | Age: 53
End: 2018-03-12
Payer: COMMERCIAL

## 2018-03-12 DIAGNOSIS — F41.1 GAD (GENERALIZED ANXIETY DISORDER): Primary | ICD-10-CM

## 2018-03-12 PROCEDURE — 90837 PSYTX W PT 60 MINUTES: CPT | Performed by: SOCIAL WORKER

## 2018-03-12 ASSESSMENT — PATIENT HEALTH QUESTIONNAIRE - PHQ9
SUM OF ALL RESPONSES TO PHQ QUESTIONS 1-9: 8
SUM OF ALL RESPONSES TO PHQ QUESTIONS 1-9: 8

## 2018-03-12 NOTE — MR AVS SNAPSHOT
After Visit Summary   3/12/2018    Aliyah Askew    MRN: [id number]           Patient Information     Date Of Birth          1965        Visit Information        Provider Department      3/12/2018 12:30 PM Henry Edwards, [facility]        Today's Diagnoses     WALLACE (generalized anxiety disorder)    -  1       Follow-ups after your visit        Your next 10 appointments already scheduled     Mar 19, 2018 11:00 AM CDT   Evaluation with Karina Carvajal OT   [facility] ([facility])    [address]  [address] [place] [id number]   [phone]            Mar 20, 2018  9:30 AM CDT   [facility] K[facility]LEE Rodriguez   [facility]    [address] [place] [id number] [phone]              Who to contact     If you have questions or need follow up information about today's clinic visit or your schedule please contact [facility] directly at [phone].  Normal or non-critical lab and imaging results will be communicated to you by [employer], letter or phone within 4 business days after the clinic has received the results. If you do not hear from us within 7 days, please contact the clinic through [employer] or phone. If you have a critical or abnormal lab result, we will notify you by phone as soon as possible.  Submit refill requests through [employer] or call your pharmacy and they will forward the refill request to us. Please allow 3 business days for your refill to be completed.          Additional Information About Your Visit        [employer] Information     [employer] gives you secure access to your electronic health record. If you see a primary care provider, you can also send messages to your care team and make appointments. If you have questions, please call your primary care clinic.  If you do not have a primary care provider, please call [phone] and  they will assist you.        Care EveryWhere ID     This is your Care EveryWhere ID. This could be used by other organizations to access your [facility] medical records  [id number]        Your Vitals Were     Last Period                   06/11/2016            Blood Pressure from Last 3 Encounters:   01/10/18 109/70   11/07/17 104/66   05/15/17 108/64    Weight from Last 3 Encounters:   01/10/18 59 kg (130 lb)   11/07/17 56.2 kg (124 lb)   05/15/17 56.7 kg (125 lb)              Today, you had the following     No orders found for display       Primary Care Provider Office Phone # Fax #    Brooklynn Leyva [phone] [address] [facility] [address]        Equal Access to Services     KRYSTAL RENDON : Hadii mat bautistao Somanda, waaxda luqadaha, qaybta kaalmada adeegyada, ania kahn . So [facility] [phone].    ATENCIÓN: Si habla español, tiene a orona disposición servicios gratuitos de asistencia lingüística. Llame al [phone].    We comply with applicable federal civil rights laws and [place] laws. We do not discriminate on the basis of race, color, national origin, age, disability, sex, sexual orientation, or gender identity.            Thank you!     Thank you for choosing [facility]  for your care. Our goal is always to provide you with excellent care. Hearing back from our patients is one way we can continue to improve our services. Please take a few minutes to complete the written survey that you may receive in the mail after your visit with us. Thank you!             Your Updated Medication List - Protect others around you: Learn how to safely use, store and throw away your medicines at www.disposemymeds.org.          This list is accurate as of 3/12/18 11:59 PM.  Always use your most recent med list.                   Brand Name Dispense Instructions for use Diagnosis    escitalopram 10 MG tablet    LEXAPRO    90 tablet    Take 1  tablet (10 mg) by mouth daily    WALLACE (generalized anxiety disorder), Mild episode of recurrent major depressive disorder (H)       hydrOXYzine 25 MG tablet    ATARAX    60 tablet    Take 1-2 tablets (25-50 mg) by mouth every 6 hours as needed for anxiety (or take at bedtime for insomnia)    WALLACE (generalized anxiety disorder), Insomnia, unspecified type       ibuprofen 600 MG tablet    ADVIL/MOTRIN    30 tablet    Take 1 tablet (600 mg) by mouth every 6 hours as needed for pain (mild)    Post-op pain       MULTIVITAMIN ADULT PO           VITAMIN D (CHOLECALCIFEROL) PO      Take by mouth daily
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
## 2018-03-13 ASSESSMENT — PATIENT HEALTH QUESTIONNAIRE - PHQ9: SUM OF ALL RESPONSES TO PHQ QUESTIONS 1-9: 8

## 2018-03-19 ENCOUNTER — HOSPITAL ENCOUNTER (OUTPATIENT)
Dept: OCCUPATIONAL THERAPY | Facility: CLINIC | Age: 53
Setting detail: THERAPIES SERIES
End: 2018-03-19
Attending: FAMILY MEDICINE
Payer: COMMERCIAL

## 2018-03-19 PROCEDURE — 97165 OT EVAL LOW COMPLEX 30 MIN: CPT | Mod: GO

## 2018-03-19 PROCEDURE — 40000125 ZZHC STATISTIC OT OUTPT VISIT

## 2018-03-20 ENCOUNTER — OFFICE VISIT (OUTPATIENT)
Dept: BEHAVIORAL HEALTH | Facility: CLINIC | Age: 53
End: 2018-03-20
Payer: COMMERCIAL

## 2018-03-20 DIAGNOSIS — F41.1 GAD (GENERALIZED ANXIETY DISORDER): Primary | ICD-10-CM

## 2018-03-20 PROCEDURE — 90837 PSYTX W PT 60 MINUTES: CPT | Performed by: SOCIAL WORKER

## 2018-03-20 NOTE — MR AVS SNAPSHOT
After Visit Summary   3/20/2018    Aliyah Askew    MRN: [id number]           Patient Information     Date Of Birth          1965        Visit Information        Provider Department      3/20/2018 9:30 AM Henry Edwards, [facility]        Today's Diagnoses     WALLACE (generalized anxiety disorder)    -  1       Follow-ups after your visit        Your next 10 appointments already scheduled     Mar 26, 2018 10:30 AM CDT   [facility]    [address] [place] [id number] [phone]            Mar 30, 2018 10:30 AM CDT   [facility]    [address] [place] [id number] [phone]              Who to contact     If you have questions or need follow up information about today's clinic visit or your schedule please contact [facility] directly at [phone].  Normal or non-critical lab and imaging results will be communicated to you by idiohart, letter or phone within 4 business days after the clinic has received the results. If you do not hear from us within 7 days, please contact the clinic through [employer] or phone. If you have a critical or abnormal lab result, we will notify you by phone as soon as possible.  Submit refill requests through [employer] or call your pharmacy and they will forward the refill request to us. Please allow 3 business days for your refill to be completed.          Additional Information About Your Visit        idiohart Information     [employer] gives you secure access to your electronic health record. If you see a primary care provider, you can also send messages to your care team and make appointments. If you have questions, please call your primary care clinic.  If you do not have a primary care provider, please call [phone] and  they will assist you.        Care EveryWhere ID     This is your Care EveryWhere ID. This could be used by other organizations to access your [place] medical records  [id number]        Your Vitals Were     Last Period                   06/11/2016            Blood Pressure from Last 3 Encounters:   01/10/18 109/70   11/07/17 104/66   05/15/17 108/64    Weight from Last 3 Encounters:   01/10/18 59 kg (130 lb)   11/07/17 56.2 kg (124 lb)   05/15/17 56.7 kg (125 lb)              Today, you had the following     No orders found for display       Primary Care Provider Office Phone # Fax #    Brooklynn Leyva [phone] [address] [facility] [address]        Equal Access to Services     KRYSTAL RENDON : Hadii mat bautistao Somanda, waaxda luqadaha, qaybta kaalmada adeegyada, ania kahn . So [facility] [phone].    ATENCIÓN: Si habla español, tiene a orona disposición servicios gratuitos de asistencia lingüística. Llame al [phone].    We comply with applicable federal civil rights laws and [place] laws. We do not discriminate on the basis of race, color, national origin, age, disability, sex, sexual orientation, or gender identity.            Thank you!     Thank you for choosing [facility]  for your care. Our goal is always to provide you with excellent care. Hearing back from our patients is one way we can continue to improve our services. Please take a few minutes to complete the written survey that you may receive in the mail after your visit with us. Thank you!             Your Updated Medication List - Protect others around you: Learn how to safely use, store and throw away your medicines at www.disposemymeds.org.          This list is accurate as of 3/20/18  4:40 PM.  Always use your most recent med list.                   Brand Name Dispense Instructions for use Diagnosis    escitalopram 10 MG tablet    LEXAPRO    90 tablet    Take 1  tablet (10 mg) by mouth daily    WALLACE (generalized anxiety disorder), Mild episode of recurrent major depressive disorder (H)       hydrOXYzine 25 MG tablet    ATARAX    60 tablet    Take 1-2 tablets (25-50 mg) by mouth every 6 hours as needed for anxiety (or take at bedtime for insomnia)    WALLACE (generalized anxiety disorder), Insomnia, unspecified type       ibuprofen 600 MG tablet    ADVIL/MOTRIN    30 tablet    Take 1 tablet (600 mg) by mouth every 6 hours as needed for pain (mild)    Post-op pain       MULTIVITAMIN ADULT PO           VITAMIN D (CHOLECALCIFEROL) PO      Take by mouth daily
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
## 2018-03-26 ENCOUNTER — OFFICE VISIT (OUTPATIENT)
Dept: BEHAVIORAL HEALTH | Facility: CLINIC | Age: 53
End: 2018-03-26
Payer: COMMERCIAL

## 2018-03-26 DIAGNOSIS — F41.1 GAD (GENERALIZED ANXIETY DISORDER): Primary | ICD-10-CM

## 2018-03-26 PROCEDURE — 90832 PSYTX W PT 30 MINUTES: CPT | Performed by: SOCIAL WORKER

## 2018-03-26 NOTE — MR AVS SNAPSHOT
After Visit Summary   3/26/2018    Aliyah Askew    MRN: [id number]           Patient Information     Date Of Birth          1965        Visit Information        Provider Department      3/26/2018 10:30 AM Henry Edwards [facility]        Today's Diagnoses     WALLACE (generalized anxiety disorder)    -  1       Follow-ups after your visit        Your next 10 appointments already scheduled     Mar 30, 2018 10:30 AM CDT   Return Visit with KAYLEE Rodriguez   [facility] ([facility])    [address] [place] [id number] [phone]              Who to contact     If you have questions or need follow up information about today's clinic visit or your schedule please contact [facility] directly at [phone].  Normal or non-critical lab and imaging results will be communicated to you by MyChart, letter or phone within 4 business days after the clinic has received the results. If you do not hear from us within 7 days, please contact the clinic through MyChart or phone. If you have a critical or abnormal lab result, we will notify you by phone as soon as possible.  Submit refill requests through [facility] or call your pharmacy and they will forward the refill request to us. Please allow 3 business days for your refill to be completed.          Additional Information About Your Visit        MyChart Information     [facility] gives you secure access to your electronic health record. If you see a primary care provider, you can also send messages to your care team and make appointments. If you have questions, please call your primary care clinic.  If you do not have a primary care provider, please call [phone] and they will assist you.        Care EveryWhere ID     This is your Care EveryWhere ID. This could be used by other organizations to access your [facility] medical records  [id number]        Your Vitals Were      Last Period                   06/11/2016            Blood Pressure from Last 3 Encounters:   01/10/18 109/70   11/07/17 104/66   05/15/17 108/64    Weight from Last 3 Encounters:   01/10/18 59 kg (130 lb)   11/07/17 56.2 kg (124 lb)   05/15/17 56.7 kg (125 lb)              Today, you had the following     No orders found for display       Primary Care Provider Office Phone # Fax #    Brooklynn Leyva [phone] [address]  [facility] [address]        Equal Access to Services     [facility]: Hadii mat lua hadasho Somanda, waaxda luqadaha, qaybta kaalmada carlos, ania kahn . [facility] [phone].    ATENCIÓN: Si habla español, tiene a orona disposición servicios gratuitos de asistencia lingüística. [facility] [phone].    We comply with applicable federal civil rights laws and [place] laws. We do not discriminate on the basis of race, color, national origin, age, disability, sex, sexual orientation, or gender identity.            Thank you!     Thank you for choosing [facility]  for your care. Our goal is always to provide you with excellent care. Hearing back from our patients is one way we can continue to improve our services. Please take a few minutes to complete the written survey that you may receive in the mail after your visit with us. Thank you!             Your Updated Medication List - Protect others around you: Learn how to safely use, store and throw away your medicines at www.disposemymeds.org.          This list is accurate as of 3/26/18 11:59 PM.  Always use your most recent med list.                   Brand Name Dispense Instructions for use Diagnosis    escitalopram 10 MG tablet    LEXAPRO    90 tablet    Take 1 tablet (10 mg) by mouth daily    WALLACE (generalized anxiety disorder), Mild episode of recurrent major depressive disorder (H)       hydrOXYzine 25 MG tablet    ATARAX    60 tablet    Take 1-2 tablets (25-50 mg)  by mouth every 6 hours as needed for anxiety (or take at bedtime for insomnia)    WALLACE (generalized anxiety disorder), Insomnia, unspecified type       ibuprofen 600 MG tablet    ADVIL/MOTRIN    30 tablet    Take 1 tablet (600 mg) by mouth every 6 hours as needed for pain (mild)    Post-op pain       MULTIVITAMIN ADULT PO           VITAMIN D (CHOLECALCIFEROL) PO      Take by mouth daily
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
## 2018-03-30 ENCOUNTER — OFFICE VISIT (OUTPATIENT)
Dept: BEHAVIORAL HEALTH | Facility: CLINIC | Age: 53
End: 2018-03-30
Payer: COMMERCIAL

## 2018-03-30 DIAGNOSIS — F41.1 GAD (GENERALIZED ANXIETY DISORDER): Primary | ICD-10-CM

## 2018-03-30 PROCEDURE — 90837 PSYTX W PT 60 MINUTES: CPT | Performed by: SOCIAL WORKER
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
## 2018-03-30 NOTE — MR AVS SNAPSHOT
After Visit Summary   3/30/2018    Aliyah Askew    MRN: [id number]           Patient Information     Date Of Birth          1965        Visit Information        Provider Department      3/30/2018 10:30 AM Henry Edwards, [facility]        Today's Diagnoses     WALLACE (generalized anxiety disorder)    -  1       Follow-ups after your visit        Who to contact     If you have questions or need follow up information about today's clinic visit or your schedule please contact [facility] directly at [phone].  Normal or non-critical lab and imaging results will be communicated to you by [email or web address], letter or phone within 4 business days after the clinic has received the results. If you do not hear from us within 7 days, please contact the clinic through [employer] or phone. If you have a critical or abnormal lab result, we will notify you by phone as soon as possible.  Submit refill requests through [employer] or call your pharmacy and they will forward the refill request to us. Please allow 3 business days for your refill to be completed.          Additional Information About Your Visit        MyChart Information     [employer] [facility] gives you secure access to your electronic health record. If you see a primary care provider, you can also send messages to your care team and make appointments. If you have questions, please call your primary care clinic.  If you do not have a primary care provider, please call [phone] and they will assist you.        Care EveryWhere ID     This is your Care EveryWhere ID. This could be used by other organizations to access your [place] medical records  [id number]        Your Vitals Were     Last Period                   06/11/2016            Blood Pressure from Last 3 Encounters:   01/10/18 109/70   11/07/17 104/66   05/15/17 108/64    Weight from Last 3 Encounters:   01/10/18 59 kg (130 lb)   11/07/17 56.2 kg (124 lb)   05/15/17 56.7 kg  (125 lb)              Today, you had the following     No orders found for display       Primary Care Provider Office Phone # Fax #    Brooklynn Leyva -[phone] [address]  [facility] [address]        Equal Access to Services     BRADLEYRHIANNON JUSTICE : Hadii aad ku hadmagoo Soomaali, waaxda luqadaha, qaybta kaalmada adeegyada, ania idiin hayalinen adecornel robledo criss . So [facility] [phone].    ATENCIÓN: Si habla español, tiene a orona disposición servicios gratuitos de asistencia lingüística. Llame al [phone].    We comply with applicable federal civil rights laws and [place] laws. We do not discriminate on the basis of race, color, national origin, age, disability, sex, sexual orientation, or gender identity.            Thank you!     Thank you for choosing [facility]  for your care. Our goal is always to provide you with excellent care. Hearing back from our patients is one way we can continue to improve our services. Please take a few minutes to complete the written survey that you may receive in the mail after your visit with us. Thank you!             Your Updated Medication List - Protect others around you: Learn how to safely use, store and throw away your medicines at www.disposemymeds.org.          This list is accurate as of 3/30/18  2:19 PM.  Always use your most recent med list.                   Brand Name Dispense Instructions for use Diagnosis    escitalopram 10 MG tablet    LEXAPRO    90 tablet    Take 1 tablet (10 mg) by mouth daily    WALLACE (generalized anxiety disorder), Mild episode of recurrent major depressive disorder (H)       hydrOXYzine 25 MG tablet    ATARAX    60 tablet    Take 1-2 tablets (25-50 mg) by mouth every 6 hours as needed for anxiety (or take at bedtime for insomnia)    WALLACE (generalized anxiety disorder), Insomnia, unspecified type       ibuprofen 600 MG tablet    ADVIL/MOTRIN    30 tablet    Take 1 tablet (600 mg) by mouth every 6 hours as  needed for pain (mild)    Post-op pain       MULTIVITAMIN ADULT PO           VITAMIN D (CHOLECALCIFEROL) PO      Take by mouth daily

## 2018-04-11 ENCOUNTER — OFFICE VISIT (OUTPATIENT)
Dept: BEHAVIORAL HEALTH | Facility: CLINIC | Age: 53
End: 2018-04-11
Payer: COMMERCIAL

## 2018-04-11 DIAGNOSIS — F41.1 GAD (GENERALIZED ANXIETY DISORDER): Primary | ICD-10-CM

## 2018-04-11 PROCEDURE — 90837 PSYTX W PT 60 MINUTES: CPT | Performed by: SOCIAL WORKER

## 2018-04-11 NOTE — MR AVS SNAPSHOT
After Visit Summary   4/11/2018    Aliyah Askew    MRN: [id number]           Patient Information     Date Of Birth          1965        Visit Information        Provider Department      4/11/2018 10:00 AM Henry Edwards, [facility]        Today's Diagnoses     WALLACE (generalized anxiety disorder)    -  1       Follow-ups after your visit        Who to contact     If you have questions or need follow up information about today's clinic visit or your schedule please contact [facility] directly at [phone].  Normal or non-critical lab and imaging results will be communicated to you by Yhathart, letter or phone within 4 business days after the clinic has received the results. If you do not hear from us within 7 days, please contact the clinic through [employer] or phone. If you have a critical or abnormal lab result, we will notify you by phone as soon as possible.  Submit refill requests through [employer] or call your pharmacy and they will forward the refill request to us. Please allow 3 business days for your refill to be completed.          Additional Information About Your Visit        MyChart Information     [employer] gives you secure access to your electronic health record. If you see a primary care provider, you can also send messages to your care team and make appointments. If you have questions, please call your primary care clinic.  If you do not have a primary care provider, please call [phone] and they will assist you.        Care EveryWhere ID     This is your Care EveryWhere ID. This could be used by other organizations to access your [place] medical records  [id number]        Your Vitals Were     Last Period                   06/11/2016            Blood Pressure from Last 3 Encounters:   01/10/18 109/70   11/07/17 104/66   05/15/17 108/64    Weight from Last 3 Encounters:   01/10/18 59 kg (130 lb)   11/07/17 56.2 kg (124 lb)   05/15/17 56.7 kg  (125 lb)              Today, you had the following     No orders found for display       Primary Care Provider Office Phone # Fax #    Brooklynn Leyva [phone] [address]  [facility] [address]        Equal Access to Services     BRADLEYRHIANNON JUSTICE : Hadii aad ku hadmagoo Soomaali, waaxda luqadaha, qaybta kaalmada adeegyada, ania idiin hayalinen adecornel robledo criss . So [facility] [phone].    ATENCIÓN: Si habla español, tiene a orona disposición servicios gratuitos de asistencia lingüística. Llame al [phone].    We comply with applicable federal civil rights laws and [place] laws. We do not discriminate on the basis of race, color, national origin, age, disability, sex, sexual orientation, or gender identity.            Thank you!     Thank you for choosing [facility]  for your care. Our goal is always to provide you with excellent care. Hearing back from our patients is one way we can continue to improve our services. Please take a few minutes to complete the written survey that you may receive in the mail after your visit with us. Thank you!             Your Updated Medication List - Protect others around you: Learn how to safely use, store and throw away your medicines at www.disposemymeds.org.          This list is accurate as of 4/11/18 11:59 PM.  Always use your most recent med list.                   Brand Name Dispense Instructions for use Diagnosis    escitalopram 10 MG tablet    LEXAPRO    90 tablet    Take 1 tablet (10 mg) by mouth daily    WALLACE (generalized anxiety disorder), Mild episode of recurrent major depressive disorder (H)       hydrOXYzine 25 MG tablet    ATARAX    60 tablet    Take 1-2 tablets (25-50 mg) by mouth every 6 hours as needed for anxiety (or take at bedtime for insomnia)    WALLACE (generalized anxiety disorder), Insomnia, unspecified type       ibuprofen 600 MG tablet    ADVIL/MOTRIN    30 tablet    Take 1 tablet (600 mg) by mouth every 6 hours as  needed for pain (mild)    Post-op pain       MULTIVITAMIN ADULT PO           VITAMIN D (CHOLECALCIFEROL) PO      Take by mouth daily
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
## 2018-04-18 ENCOUNTER — TELEPHONE (OUTPATIENT)
Dept: BEHAVIORAL HEALTH | Facility: CLINIC | Age: 53
End: 2018-04-18

## 2018-04-18 NOTE — TELEPHONE ENCOUNTER
Returned pt lamberto of distress past 4 days to to increase vibration sound from neighbors garage.

## 2018-04-19 ENCOUNTER — TELEPHONE (OUTPATIENT)
Dept: FAMILY MEDICINE | Facility: CLINIC | Age: 53
End: 2018-04-19

## 2018-04-19 ENCOUNTER — OFFICE VISIT (OUTPATIENT)
Dept: BEHAVIORAL HEALTH | Facility: CLINIC | Age: 53
End: 2018-04-19
Payer: COMMERCIAL

## 2018-04-19 DIAGNOSIS — F32.9 MAJOR DEPRESSION: Primary | ICD-10-CM

## 2018-04-19 PROCEDURE — 90837 PSYTX W PT 60 MINUTES: CPT | Performed by: SOCIAL WORKER

## 2018-04-19 NOTE — MR AVS SNAPSHOT
After Visit Summary   4/19/2018    Aliyah Askew    MRN: [id number]           Patient Information     Date Of Birth          1965        Visit Information        Provider Department      4/19/2018 12:00 PM [facility]        Today's Diagnoses     Major depression    -  1       Follow-ups after your visit        Your next 10 appointments already scheduled     Apr 25, 2018  3:00 PM CDT   Return Visit with KAYLEE Rodriguez   [facility] ([facility])    [address] [place] [id number] [phone]              Who to contact     If you have questions or need follow up information about today's clinic visit or your schedule please contact [facility] directly at [phone].  Normal or non-critical lab and imaging results will be communicated to you by MyChart, letter or phone within 4 business days after the clinic has received the results. If you do not hear from us within 7 days, please contact the clinic through MyChart or phone. If you have a critical or abnormal lab result, we will notify you by phone as soon as possible.  Submit refill requests through [employer] or call your pharmacy and they will forward the refill request to us. Please allow 3 business days for your refill to be completed.          Additional Information About Your Visit        MyChart Information     [employer] gives you secure access to your electronic health record. If you see a primary care provider, you can also send messages to your care team and make appointments. If you have questions, please call your primary care clinic.  If you do not have a primary care provider, please call [phone] and they will assist you.        Care EveryWhere ID     This is your Care EveryWhere ID. This could be used by other organizations to access your [facility] medical records  [id number]        Your Vitals Were     Last Period                    06/11/2016            Blood Pressure from Last 3 Encounters:   01/10/18 109/70   11/07/17 104/66   05/15/17 108/64    Weight from Last 3 Encounters:   01/10/18 59 kg (130 lb)   11/07/17 56.2 kg (124 lb)   05/15/17 56.7 kg (125 lb)              Today, you had the following     No orders found for display       Primary Care Provider Office Phone # Fax #    Brooklynn Leyva [phone]       [address]  [facility] [address]        Equal Access to Services     [facility]: Hadii aad ku hadasho Soomaali, waaxda luqadaha, qaybta kaalmada carlos, ania kahn . So [facility] [phone].    ATENCIÓN: Si habla español, tiene a orona disposición servicios gratuitos de asistencia lingüística. [facility] [phone].    We comply with applicable federal civil rights laws and [place] laws. We do not discriminate on the basis of race, color, national origin, age, disability, sex, sexual orientation, or gender identity.            Thank you!     Thank you for choosing [facility]  for your care. Our goal is always to provide you with excellent care. Hearing back from our patients is one way we can continue to improve our services. Please take a few minutes to complete the written survey that you may receive in the mail after your visit with us. Thank you!             Your Updated Medication List - Protect others around you: Learn how to safely use, store and throw away your medicines at www.disposemymeds.org.          This list is accurate as of 4/19/18 11:59 PM.  Always use your most recent med list.                   Brand Name Dispense Instructions for use Diagnosis    escitalopram 10 MG tablet    LEXAPRO    90 tablet    Take 1 tablet (10 mg) by mouth daily    WALLACE (generalized anxiety disorder), Mild episode of recurrent major depressive disorder (H)       hydrOXYzine 25 MG tablet    ATARAX    60 tablet    Take 1-2 tablets (25-50 mg) by mouth every 6  hours as needed for anxiety (or take at bedtime for insomnia)    WALLACE (generalized anxiety disorder), Insomnia, unspecified type       ibuprofen 600 MG tablet    ADVIL/MOTRIN    30 tablet    Take 1 tablet (600 mg) by mouth every 6 hours as needed for pain (mild)    Post-op pain       MULTIVITAMIN ADULT PO           VITAMIN D (CHOLECALCIFEROL) PO      Take by mouth daily

## 2018-04-19 NOTE — TELEPHONE ENCOUNTER
Reason for Call:  Other prescription    Detailed comments: patient states that per [facility] that he was to speak with Dr Leyva regarding a medication and she was to call to find out about it. This would be a medication that patient in not currently taking     Phone Number Patient can be reached at: Home number on file [phone] (home)    Best Time:     Can we leave a detailed message on this number? YES    Call taken on 4/19/2018 at 2:54 PM by Alissa Davalos

## 2018-04-20 ENCOUNTER — OFFICE VISIT (OUTPATIENT)
Dept: FAMILY MEDICINE | Facility: CLINIC | Age: 53
End: 2018-04-20
Payer: COMMERCIAL

## 2018-04-20 VITALS
HEART RATE: 58 BPM | BODY MASS INDEX: 24.28 KG/M2 | TEMPERATURE: 97.9 F | SYSTOLIC BLOOD PRESSURE: 118 MMHG | RESPIRATION RATE: 17 BRPM | WEIGHT: 136 LBS | OXYGEN SATURATION: 98 % | DIASTOLIC BLOOD PRESSURE: 68 MMHG

## 2018-04-20 DIAGNOSIS — G47.00 INSOMNIA, UNSPECIFIED TYPE: Primary | ICD-10-CM

## 2018-04-20 PROCEDURE — 99213 OFFICE O/P EST LOW 20 MIN: CPT | Performed by: PHYSICIAN ASSISTANT

## 2018-04-20 RX ORDER — ESZOPICLONE 1 MG/1
1 TABLET, FILM COATED ORAL
Qty: 30 TABLET | Refills: 1 | Status: SHIPPED | OUTPATIENT
Start: 2018-04-20 | End: 2021-03-31

## 2018-04-20 RX ORDER — TRAZODONE HYDROCHLORIDE 50 MG/1
50-100 TABLET, FILM COATED ORAL
Qty: 30 TABLET | Refills: 1 | Status: SHIPPED | OUTPATIENT
Start: 2018-04-20 | End: 2021-03-31

## 2018-04-20 NOTE — PROGRESS NOTES
SUBJECTIVE:   Aliyah Askew is a 52 year old female who presents to clinic today for the following health issues:      Insomnia      Duration: On going for a year    Description  Frequency of insomnia:  nightly  Time to fall asleep: few hours  Middle of night awakening:  several times a week   Early morning awakenin-2 times a week    Accompanying signs and symptoms:  none    History  Similar episodes in past:  YES  Previous evaluation/sleep study:  no     Precipitating or alleviating factors:  New stressful situation: no but has a a lot of disturbance noises at home   Caffeine intake after lunchtime: YES  Any new medications: no     Therapies tried and outcome: nyquil-does not help     Working with Henry Edwards, Behavioral Health Consultant regularly with CBT and mindfulness.    Patient tried hydroxyzine (atarax) last year with PCP - stomach upset and felt groggy in the am.    Patient wondering about other options but hesitant for too strong medicines.    Patient has taken lexapro in the past off/on in the past, doesn't help with sleep though.          Problem list and histories reviewed & adjusted, as indicated.  Additional history: as documented    Patient Active Problem List   Diagnosis     CARDIOVASCULAR SCREENING; LDL GOAL LESS THAN 160     Major depression     Vitamin D deficiency     Leukopenia     S/P laparoscopic supracervical hysterectomy     Urinary urgency     Urge incontinence     Urgency incontinence     Somatic dysfunction of pelvic region     Chronic pain of right knee     WALLACE (generalized anxiety disorder)     Past Surgical History:   Procedure Laterality Date     BIOPSY  17     HYSTERECTOMY, SUPRACERVICAL LAPAROSCOPIC  6/15/16     LAPAROSCOPIC HYSTERECTOMY TOTAL, SALPINGECTOMY BILATERAL N/A 6/15/2016    Procedure: LAPAROSCOPIC HYSTERECTOMY TOTAL, SALPINGECTOMY BILATERAL;  Surgeon: Kavita Manley MD;  Location: [facility]     LAPAROSCOPIC SALPINGECTOMY Bilateral 6/15/16       Social  History   Substance Use Topics     Smoking status: Never Smoker     Smokeless tobacco: Never Used     Alcohol use Yes      Comment: 5 drinks per week     Family History   Problem Relation Age of Onset     HEART DISEASE Father      early 40's had first episode, ?heart related, age 42     Anxiety Disorder Father      Obesity Father      Hypertension Mother      Asthma Mother      OSTEOPOROSIS Mother      Arthritis Mother      HEART DISEASE Mother      Obesity Sister      s/p gastric bypass     CANCER Maternal Aunt      brain tumor     Eye Disorder Brother      Eye Disorder Sister      DIABETES Brother      DIABETES Brother      Obesity Brother      Hypertension Sister      Anxiety Disorder Sister      Thyroid Disease Sister      Obesity Sister      Anxiety Disorder Brother          Current Outpatient Prescriptions   Medication Sig Dispense Refill     eszopiclone (LUNESTA) 1 MG TABS tablet Take 1 tablet (1 mg) by mouth nightly as needed for sleep 30 tablet 1     traZODone (DESYREL) 50 MG tablet Take 1-2 tablets ( mg) by mouth nightly as needed for sleep 30 tablet 1     ibuprofen (ADVIL,MOTRIN) 600 MG tablet Take 1 tablet (600 mg) by mouth every 6 hours as needed for pain (mild) (Patient not taking: Reported on 2018) 30 tablet 0     Multiple Vitamins-Minerals (MULTIVITAMIN ADULT PO)        VITAMIN D, CHOLECALCIFEROL, PO Take by mouth daily       Allergies   Allergen Reactions     Adhesive Tape      Aminoglycosides      Ofloxacin      eye drops       Reviewed and updated as needed this visit by clinical staff  Tobacco  Allergies  Meds  Problems  Med Hx  Surg Hx  Fam Hx  Soc Hx        Reviewed and updated as needed this visit by Provider  Allergies  Meds  Problems         ROS:  Constitutional, HEENT, cardiovascular, pulmonary, GI, , musculoskeletal, neuro, skin, endocrine and psych systems are negative, except as otherwise noted.    OBJECTIVE:     /68 (BP Location: Left arm, Patient  Position: Sitting, Cuff Size: Adult Regular)  Pulse 58  Temp 97.9  F (36.6  C) (Oral)  Resp 17  Wt 136 lb (61.7 kg)  LMP 06/11/2016  SpO2 98%  BMI 24.28 kg/m2  Body mass index is 24.28 kg/(m^2).  GENERAL: healthy, alert and no distress  RESP: lungs clear to auscultation - no rales, rhonchi or wheezes  CV: regular rate and rhythm, normal S1 S2, no S3 or S4, no murmur, click or rub, no peripheral edema and peripheral pulses strong  PSYCH: mentation appears normal, affect normal/bright    Diagnostic Test Results:  none     ASSESSMENT/PLAN:       ICD-10-CM    1. Insomnia, unspecified type G47.00 eszopiclone (LUNESTA) 1 MG TABS tablet     traZODone (DESYREL) 50 MG tablet       Patient Instructions   Trial of as needed Trazodone vs Lunesta sent to pharmacy.  Return to clinic with any worsening or changes in symptoms and follow up with PCP for routine care.       Insomnia  Insomnia is repeated difficulty going to sleep or staying asleep, or both. Whether you have insomnia is not defined by a specific amount of sleep. Different people need different amounts of sleep, and you may need more or less sleep at different times of your life.  There are 3 major types of insomnia:  short-term, chronic, and  other.   Short-term, or acute insomnia lasts less than 3 months.  The symptoms are temporary and can be linked directly to a stressor, such as the death of a loved one, financial problems, or a new physical problem.  Short-term insomnia stops when the stressor resolves or the person adapts to its presence.  Chronic insomnia occurs at least 3 times a week and lasts longer than 3 months.  Chronic insomnia can occur when either the cause of the sleeping problem is not clear, or the insomnia does not get better when the stressor is resolved. A number of other criteria are also used to make the diagnosis of chronic insomnia.    Other insomnia  is the third type of insomnia-related sleep disorders.  This description applies to  people who have problems getting to sleep or staying asleep, but do not meet all of the factors that describe either short-term or chronic insomnia.    Many things cause insomnia. Different people may have different causes. It can be from an underlying medical or psychological condition, or lifestyle. It can also be primary insomnia, which means no cause can be found.  Causes of insomnia include:    Chronic medical problems- heart disease, gastrointestinal problems, hormonal changes, breathing problems    Anxiety    Stress    Depression    Pain    Work schedule    Sleep apnea    Illegal drugs    Certain medicines  Many different medidcines can affect your sleep, such as stimulants, caffeine, alcohol, some decongestants, and diet pills. Other medicines may include some types of blood pressure pills, steroids, asthma medicines, antihistamines, antidepressants, seizure medicines and statins. Not all of these will affect your sleep, and they shouldn t be stopped without talking to your doctor.  Symptoms of insomnia can include:    Lying awake for long periods at night before falling asleep    Waking up several times during the night    Waking up early in the morning and not being able to get back to sleep    Feeling tired and not refreshed by sleep    Not being able to function properly during the day and finding it hard to concentrate    Irritability    Tiredness and fatigue during the day  Home care    Review your medicines with your doctor or pharmacist to find out if they can cause insomnia. Not all medicines will affect your sleep, but they shouldn't be stopped without reviewing them with your doctor. There may be serious side effects and consequences from suddenly stopping your medicines. Not taking them may cause strokes, heart attacks, and many other problems.    Caffeine, smoking and alcohol also affect sleep. Limit your daily use and do not use these before bedtime. Alcohol may make you sleepy at first, but as  its effects wear off, you may awaken a few hours later and have trouble returning to sleep.    Do not exercise, eat or drink large amounts of liquid within 2 hours of your bedtime.    Improve your sleep habits. Have a fixed bed and wake-up time. Try to keep noise, light and heat in your bedroom at a comfortable level. Try using earplugs or eyeshades if needed.     Avoid watching TV in bed.    If you do not fall asleep within 30 minutes, try to relax by reading or listening to soft music.    Limit daytime napping to one 30 minute period, early in the day.    Get regular exercise. Find other ways to lessen your stress level.    If a medicine was prescribed to help reset your sleep patterns, take it as directed. Sleeping pills are intended for short-term use, only. If taken for too long, the effect wears off while the risk of physical addiction and psychological dependence increases.  Sleep diary  If the cause isn t obvious and it is not improving, try keeping a  sleep diary  for a couple of weeks. Include in it:    The time you go to bed    How long it takes to fall asleep    How many times you wake up    What time you wake up    Your meal times and what you eat    What time you drink alcohol    Your exercise habits and times  Follow-up care  Follow up with your healthcare provider, or as advised. If X-rays or CT scans were done, you will be notified if there is a change in the reading, especially if it affects treatment.  Call 911  Call 911 if any of these occur:    Trouble breathing    Confusion or trouble waking    Fainting or loss of consciousness    Rapid heart rate    New chest, arm, shoulder, neck or upper back pain    Trouble with speech or vision, weakness of an arm or leg    Trouble walking or talking, loss of balance, numbness or weakness in one side of your body, facial droop  When to seek medical advice  Call your healthcare provider right away if any of these occur:    Extreme restlessness or  irritability    Confusion or hallucinations (seeing or hearing things that are not there)    Anxiety, depression    Several days without sleeping  Date Last Reviewed: 11/19/2015 2000-2017 [employer] [address], [employer] [address] [facility], [place] [address]. All rights reserved. This information is not intended as a substitute for professional medical care. Always follow your healthcare professional's instructions.          Kendra Little PA-C  [facility]

## 2018-04-20 NOTE — PATIENT INSTRUCTIONS
Trial of as needed Trazodone vs Lunesta sent to pharmacy.  Return to clinic with any worsening or changes in symptoms and follow up with PCP for routine care.       Insomnia  Insomnia is repeated difficulty going to sleep or staying asleep, or both. Whether you have insomnia is not defined by a specific amount of sleep. Different people need different amounts of sleep, and you may need more or less sleep at different times of your life.  There are 3 major types of insomnia:  short-term, chronic, and  other.   Short-term, or acute insomnia lasts less than 3 months.  The symptoms are temporary and can be linked directly to a stressor, such as the death of a loved one, financial problems, or a new physical problem.  Short-term insomnia stops when the stressor resolves or the person adapts to its presence.  Chronic insomnia occurs at least 3 times a week and lasts longer than 3 months.  Chronic insomnia can occur when either the cause of the sleeping problem is not clear, or the insomnia does not get better when the stressor is resolved. A number of other criteria are also used to make the diagnosis of chronic insomnia.    Other insomnia  is the third type of insomnia-related sleep disorders.  This description applies to people who have problems getting to sleep or staying asleep, but do not meet all of the factors that describe either short-term or chronic insomnia.    Many things cause insomnia. Different people may have different causes. It can be from an underlying medical or psychological condition, or lifestyle. It can also be primary insomnia, which means no cause can be found.  Causes of insomnia include:    Chronic medical problems- heart disease, gastrointestinal problems, hormonal changes, breathing problems    Anxiety    Stress    Depression    Pain    Work schedule    Sleep apnea    Illegal drugs    Certain medicines  Many different medidcines can affect your sleep, such as stimulants, caffeine, alcohol,  some decongestants, and diet pills. Other medicines may include some types of blood pressure pills, steroids, asthma medicines, antihistamines, antidepressants, seizure medicines and statins. Not all of these will affect your sleep, and they shouldn t be stopped without talking to your doctor.  Symptoms of insomnia can include:    Lying awake for long periods at night before falling asleep    Waking up several times during the night    Waking up early in the morning and not being able to get back to sleep    Feeling tired and not refreshed by sleep    Not being able to function properly during the day and finding it hard to concentrate    Irritability    Tiredness and fatigue during the day  Home care    Review your medicines with your doctor or pharmacist to find out if they can cause insomnia. Not all medicines will affect your sleep, but they shouldn't be stopped without reviewing them with your doctor. There may be serious side effects and consequences from suddenly stopping your medicines. Not taking them may cause strokes, heart attacks, and many other problems.    Caffeine, smoking and alcohol also affect sleep. Limit your daily use and do not use these before bedtime. Alcohol may make you sleepy at first, but as its effects wear off, you may awaken a few hours later and have trouble returning to sleep.    Do not exercise, eat or drink large amounts of liquid within 2 hours of your bedtime.    Improve your sleep habits. Have a fixed bed and wake-up time. Try to keep noise, light and heat in your bedroom at a comfortable level. Try using earplugs or eyeshades if needed.     Avoid watching TV in bed.    If you do not fall asleep within 30 minutes, try to relax by reading or listening to soft music.    Limit daytime napping to one 30 minute period, early in the day.    Get regular exercise. Find other ways to lessen your stress level.    If a medicine was prescribed to help reset your sleep patterns, take it as  directed. Sleeping pills are intended for short-term use, only. If taken for too long, the effect wears off while the risk of physical addiction and psychological dependence increases.  Sleep diary  If the cause isn t obvious and it is not improving, try keeping a  sleep diary  for a couple of weeks. Include in it:    The time you go to bed    How long it takes to fall asleep    How many times you wake up    What time you wake up    Your meal times and what you eat    What time you drink alcohol    Your exercise habits and times  Follow-up care  Follow up with your healthcare provider, or as advised. If X-rays or CT scans were done, you will be notified if there is a change in the reading, especially if it affects treatment.  Call 911  Call 911 if any of these occur:    Trouble breathing    Confusion or trouble waking    Fainting or loss of consciousness    Rapid heart rate    New chest, arm, shoulder, neck or upper back pain    Trouble with speech or vision, weakness of an arm or leg    Trouble walking or talking, loss of balance, numbness or weakness in one side of your body, facial droop  When to seek medical advice  Call your healthcare provider right away if any of these occur:    Extreme restlessness or irritability    Confusion or hallucinations (seeing or hearing things that are not there)    Anxiety, depression    Several days without sleeping  Date Last Reviewed: 11/19/2015 2000-2017 [employer]. [address] [place] [address], [place] [address]. All rights reserved. This information is not intended as a substitute for professional medical care. Always follow your healthcare professional's instructions.
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
## 2018-04-20 NOTE — MR AVS SNAPSHOT
After Visit Summary   4/20/2018    Aliyah Askew    MRN: [id number]           Patient Information     Date Of Birth          1965        Visit Information        Provider Department      4/20/2018 11:20 AM Kendra Little PA-C [facility]        Today's Diagnoses     Insomnia, unspecified type    -  1      Care Instructions    Trial of as needed Trazodone vs Lunesta sent to pharmacy.  Return to clinic with any worsening or changes in symptoms and follow up with PCP for routine care.         Insomnia  Insomnia is repeated difficulty going to sleep or staying asleep, or both. Whether you have insomnia is not defined by a specific amount of sleep. Different people need different amounts of sleep, and you may need more or less sleep at different times of your life.  There are 3 major types of insomnia:  short-term, chronic, and  other.   Short-term, or acute insomnia lasts less than 3 months.  The symptoms are temporary and can be linked directly to a stressor, such as the death of a loved one, financial problems, or a new physical problem.  Short-term insomnia stops when the stressor resolves or the person adapts to its presence.  Chronic insomnia occurs at least 3 times a week and lasts longer than 3 months.  Chronic insomnia can occur when either the cause of the sleeping problem is not clear, or the insomnia does not get better when the stressor is resolved. A number of other criteria are also used to make the diagnosis of chronic insomnia.    Other insomnia  is the third type of insomnia-related sleep disorders.  This description applies to people who have problems getting to sleep or staying asleep, but do not meet all of the factors that describe either short-term or chronic insomnia.    Many things cause insomnia. Different people may have different causes. It can be from an underlying medical or psychological condition, or lifestyle. It can also be primary insomnia,  which means no cause can be found.  Causes of insomnia include:    Chronic medical problems- heart disease, gastrointestinal problems, hormonal changes, breathing problems    Anxiety    Stress    Depression    Pain    Work schedule    Sleep apnea    Illegal drugs    Certain medicines  Many different medidcines can affect your sleep, such as stimulants, caffeine, alcohol, some decongestants, and diet pills. Other medicines may include some types of blood pressure pills, steroids, asthma medicines, antihistamines, antidepressants, seizure medicines and statins. Not all of these will affect your sleep, and they shouldn t be stopped without talking to your doctor.  Symptoms of insomnia can include:    Lying awake for long periods at night before falling asleep    Waking up several times during the night    Waking up early in the morning and not being able to get back to sleep    Feeling tired and not refreshed by sleep    Not being able to function properly during the day and finding it hard to concentrate    Irritability    Tiredness and fatigue during the day  Home care    Review your medicines with your doctor or pharmacist to find out if they can cause insomnia. Not all medicines will affect your sleep, but they shouldn't be stopped without reviewing them with your doctor. There may be serious side effects and consequences from suddenly stopping your medicines. Not taking them may cause strokes, heart attacks, and many other problems.    Caffeine, smoking and alcohol also affect sleep. Limit your daily use and do not use these before bedtime. Alcohol may make you sleepy at first, but as its effects wear off, you may awaken a few hours later and have trouble returning to sleep.    Do not exercise, eat or drink large amounts of liquid within 2 hours of your bedtime.    Improve your sleep habits. Have a fixed bed and wake-up time. Try to keep noise, light and heat in your bedroom at a comfortable level. Try using  earplugs or eyeshades if needed.     Avoid watching TV in bed.    If you do not fall asleep within 30 minutes, try to relax by reading or listening to soft music.    Limit daytime napping to one 30 minute period, early in the day.    Get regular exercise. Find other ways to lessen your stress level.    If a medicine was prescribed to help reset your sleep patterns, take it as directed. Sleeping pills are intended for short-term use, only. If taken for too long, the effect wears off while the risk of physical addiction and psychological dependence increases.  Sleep diary  If the cause isn t obvious and it is not improving, try keeping a  sleep diary  for a couple of weeks. Include in it:    The time you go to bed    How long it takes to fall asleep    How many times you wake up    What time you wake up    Your meal times and what you eat    What time you drink alcohol    Your exercise habits and times  Follow-up care  Follow up with your healthcare provider, or as advised. If X-rays or CT scans were done, you will be notified if there is a change in the reading, especially if it affects treatment.  Call 911  Call 911 if any of these occur:    Trouble breathing    Confusion or trouble waking    Fainting or loss of consciousness    Rapid heart rate    New chest, arm, shoulder, neck or upper back pain    Trouble with speech or vision, weakness of an arm or leg    Trouble walking or talking, loss of balance, numbness or weakness in one side of your body, facial droop  When to seek medical advice  Call your healthcare provider right away if any of these occur:    Extreme restlessness or irritability    Confusion or hallucinations (seeing or hearing things that are not there)    Anxiety, depression    Several days without sleeping  Date Last Reviewed: 11/19/2015 2000-2017 [employer]. [address] [place] [address], [place] [address]. All rights reserved. This information is not intended as a substitute for  professional medical care. Always follow your healthcare professional's instructions.              Follow-ups after your visit        Your next 10 appointments already scheduled     Apr 25, 2018  3:00 PM CDT   Return Visit with KAYLEE Rodriguez   [facility] ([facility])    [address] [place] [id number] [phone]              Who to contact     If you have questions or need follow up information about today's clinic visit or your schedule please contact [facility] directly at [phone].  Normal or non-critical lab and imaging results will be communicated to you by [employer], letter or phone within 4 business days after the clinic has received the results. If you do not hear from us within 7 days, please contact the clinic through [employer] or phone. If you have a critical or abnormal lab result, we will notify you by phone as soon as possible.  Submit refill requests through [employer] or call your pharmacy and they will forward the refill request to us. Please allow 3 business days for your refill to be completed.          Additional Information About Your Visit        MyChart Information     [employer] gives you secure access to your electronic health record. If you see a primary care provider, you can also send messages to your care team and make appointments. If you have questions, please call your primary care clinic.  If you do not have a primary care provider, please call [phone] and they will assist you.        Care EveryWhere ID     This is your Care EveryWhere ID. This could be used by other organizations to access your [facility] medical records  [id number]        Your Vitals Were     Pulse Temperature Respirations Last Period Pulse Oximetry BMI (Body Mass Index)    58 97.9  F (36.6  C) (Oral) 17 06/11/2016 98% 24.28 kg/m2       Blood Pressure from Last 3 Encounters:   04/20/18 118/68   01/10/18 109/70   11/07/17 104/66    Weight  from Last 3 Encounters:   04/20/18 136 lb (61.7 kg)   01/10/18 130 lb (59 kg)   11/07/17 124 lb (56.2 kg)              Today, you had the following     No orders found for display         Today's Medication Changes          These changes are accurate as of 4/20/18 11:48 AM.  If you have any questions, ask your nurse or doctor.               Start taking these medicines.        Dose/Directions    eszopiclone 1 MG Tabs tablet   Commonly known as:  LUNESTA   Used for:  Insomnia, unspecified type   Started by:  Kendra Little PA-C        Dose:  1 mg   Take 1 tablet (1 mg) by mouth nightly as needed for sleep   Quantity:  30 tablet   Refills:  1       traZODone 50 MG tablet   Commonly known as:  DESYREL   Used for:  Insomnia, unspecified type   Started by:  Kendra Little PA-C        Dose:   mg   Take 1-2 tablets ( mg) by mouth nightly as needed for sleep   Quantity:  30 tablet   Refills:  1         Stop taking these medicines if you haven't already. Please contact your care team if you have questions.     escitalopram 10 MG tablet   Commonly known as:  LEXAPRO   Stopped by:  Kendra Little PA-C           hydrOXYzine 25 MG tablet   Commonly known as:  ATARAX   Stopped by:  Kendra Little PA-C                Where to get your medicines      These medications were sent to [facility] [address]  [address] [facility] [address]     Phone:  [phone]     traZODone 50 MG tablet         Some of these will need a paper prescription and others can be bought over the counter.  Ask your nurse if you have questions.     Bring a paper prescription for each of these medications     eszopiclone 1 MG Tabs tablet                Primary Care Provider Office Phone # Fax #    Brooklynn Leyva [phone]       [address]  [facility] [address]        Equal Access to Services     KRYSTAL RENDON AH: Godwin mchugh  Carmelita, danielleda lurhodaadaha, qaradhata kavargas gonzalez, ania sumner evonnetavo laruthmartha kevin. So [facility] [phone].    ATENCIÓN: Si edgard dalal, tiene a orona disposición servicios gratuitos de asistencia lingüística. Bryan al [phone].    We comply with applicable federal civil rights laws and [place] laws. We do not discriminate on the basis of race, color, national origin, age, disability, sex, sexual orientation, or gender identity.            Thank you!     Thank you for choosing [facility]  for your care. Our goal is always to provide you with excellent care. Hearing back from our patients is one way we can continue to improve our services. Please take a few minutes to complete the written survey that you may receive in the mail after your visit with us. Thank you!             Your Updated Medication List - Protect others around you: Learn how to safely use, store and throw away your medicines at www.disposemymeds.org.          This list is accurate as of 4/20/18 11:48 AM.  Always use your most recent med list.                   Brand Name Dispense Instructions for use Diagnosis    eszopiclone 1 MG Tabs tablet    LUNESTA    30 tablet    Take 1 tablet (1 mg) by mouth nightly as needed for sleep    Insomnia, unspecified type       ibuprofen 600 MG tablet    ADVIL/MOTRIN    30 tablet    Take 1 tablet (600 mg) by mouth every 6 hours as needed for pain (mild)    Post-op pain       MULTIVITAMIN ADULT PO           traZODone 50 MG tablet    DESYREL    30 tablet    Take 1-2 tablets ( mg) by mouth nightly as needed for sleep    Insomnia, unspecified type       VITAMIN D (CHOLECALCIFEROL) PO      Take by mouth daily

## 2018-04-25 ENCOUNTER — OFFICE VISIT (OUTPATIENT)
Dept: BEHAVIORAL HEALTH | Facility: CLINIC | Age: 53
End: 2018-04-25
Payer: COMMERCIAL

## 2018-04-25 DIAGNOSIS — F41.1 GAD (GENERALIZED ANXIETY DISORDER): Primary | ICD-10-CM

## 2018-04-25 PROCEDURE — 90834 PSYTX W PT 45 MINUTES: CPT | Performed by: SOCIAL WORKER

## 2018-04-25 ASSESSMENT — ANXIETY QUESTIONNAIRES
3. WORRYING TOO MUCH ABOUT DIFFERENT THINGS: SEVERAL DAYS
GAD7 TOTAL SCORE: 6
5. BEING SO RESTLESS THAT IT IS HARD TO SIT STILL: NOT AT ALL
GAD7 TOTAL SCORE: 6
2. NOT BEING ABLE TO STOP OR CONTROL WORRYING: SEVERAL DAYS
4. TROUBLE RELAXING: SEVERAL DAYS
GAD7 TOTAL SCORE: 6
1. FEELING NERVOUS, ANXIOUS, OR ON EDGE: SEVERAL DAYS
7. FEELING AFRAID AS IF SOMETHING AWFUL MIGHT HAPPEN: SEVERAL DAYS
6. BECOMING EASILY ANNOYED OR IRRITABLE: SEVERAL DAYS
7. FEELING AFRAID AS IF SOMETHING AWFUL MIGHT HAPPEN: SEVERAL DAYS

## 2018-04-25 ASSESSMENT — PATIENT HEALTH QUESTIONNAIRE - PHQ9
10. IF YOU CHECKED OFF ANY PROBLEMS, HOW DIFFICULT HAVE THESE PROBLEMS MADE IT FOR YOU TO DO YOUR WORK, TAKE CARE OF THINGS AT HOME, OR GET ALONG WITH OTHER PEOPLE: VERY DIFFICULT
SUM OF ALL RESPONSES TO PHQ QUESTIONS 1-9: 8
SUM OF ALL RESPONSES TO PHQ QUESTIONS 1-9: 8

## 2018-04-25 NOTE — MR AVS SNAPSHOT
After Visit Summary   4/25/2018    Aliyah Askew    MRN: [id number]           Patient Information     Date Of Birth          1965        Visit Information        Provider Department      4/25/2018 3:00 PM [facility]        Today's Diagnoses     WALLACE (generalized anxiety disorder)    -  1       Follow-ups after your visit        Your next 10 appointments already scheduled     May 04, 2018  2:00 PM CDT   Return Visit with KAYLEE Rodriguez   [facility]    [address] [place] [address] [phone]              Who to contact     If you have questions or need follow up information about today's clinic visit or your schedule please contact [facility] directly at [phone].  Normal or non-critical lab and imaging results will be communicated to you by MyChart, letter or phone within 4 business days after the clinic has received the results. If you do not hear from us within 7 days, please contact the clinic through MyChart or phone. If you have a critical or abnormal lab result, we will notify you by phone as soon as possible.  Submit refill requests through [employer] or call your pharmacy and they will forward the refill request to us. Please allow 3 business days for your refill to be completed.          Additional Information About Your Visit        MyChart Information     [employer] gives you secure access to your electronic health record. If you see a primary care provider, you can also send messages to your care team and make appointments. If you have questions, please call your primary care clinic.  If you do not have a primary care provider, please call [phone] and they will assist you.        Care EveryWhere ID     This is your Care EveryWhere ID. This could be used by other organizations to access your [facility] medical records  [id number]        Your Vitals Were      Last Period                   06/11/2016            Blood Pressure from Last 3 Encounters:   04/20/18 118/68   01/10/18 109/70   11/07/17 104/66    Weight from Last 3 Encounters:   04/20/18 61.7 kg (136 lb)   01/10/18 59 kg (130 lb)   11/07/17 56.2 kg (124 lb)              Today, you had the following     No orders found for display       Primary Care Provider Office Phone # Fax #    Brooklynn Leyva [phone] [address]  [facility] [address]        Equal Access to Services     [facility]: Hadii mat ku hadasho Soomaali, waaxda luqadaha, qaybta kaalmada carlos, ania kahn . So [facility] [phone].    ATENCIÓN: Si habla español, tiene a orona disposición servicios gratuitos de asistencia lingüística. [facility] [phone].    We comply with applicable federal civil rights laws and [place] laws. We do not discriminate on the basis of race, color, national origin, age, disability, sex, sexual orientation, or gender identity.            Thank you!     Thank you for choosing [facility]  for your care. Our goal is always to provide you with excellent care. Hearing back from our patients is one way we can continue to improve our services. Please take a few minutes to complete the written survey that you may receive in the mail after your visit with us. Thank you!             Your Updated Medication List - Protect others around you: Learn how to safely use, store and throw away your medicines at www.disposemymeds.org.          This list is accurate as of 4/25/18 11:59 PM.  Always use your most recent med list.                   Brand Name Dispense Instructions for use Diagnosis    eszopiclone 1 MG Tabs tablet    LUNESTA    30 tablet    Take 1 tablet (1 mg) by mouth nightly as needed for sleep    Insomnia, unspecified type       ibuprofen 600 MG tablet    ADVIL/MOTRIN    30 tablet    Take 1 tablet (600 mg) by mouth every 6 hours as needed for pain  (mild)    Post-op pain       MULTIVITAMIN ADULT PO           traZODone 50 MG tablet    DESYREL    30 tablet    Take 1-2 tablets ( mg) by mouth nightly as needed for sleep    Insomnia, unspecified type       VITAMIN D (CHOLECALCIFEROL) PO      Take by mouth daily

## 2018-04-26 ASSESSMENT — ANXIETY QUESTIONNAIRES: GAD7 TOTAL SCORE: 6

## 2018-04-26 ASSESSMENT — PATIENT HEALTH QUESTIONNAIRE - PHQ9: SUM OF ALL RESPONSES TO PHQ QUESTIONS 1-9: 8
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
## 2018-05-04 ENCOUNTER — OFFICE VISIT (OUTPATIENT)
Dept: BEHAVIORAL HEALTH | Facility: CLINIC | Age: 53
End: 2018-05-04
Payer: COMMERCIAL

## 2018-05-04 DIAGNOSIS — F41.1 GAD (GENERALIZED ANXIETY DISORDER): Primary | ICD-10-CM

## 2018-05-04 PROCEDURE — 90834 PSYTX W PT 45 MINUTES: CPT | Performed by: SOCIAL WORKER

## 2018-05-04 NOTE — MR AVS SNAPSHOT
After Visit Summary   5/4/2018    Aliyah Askew    MRN: [id number]           Patient Information     Date Of Birth          1965        Visit Information        Provider Department      5/4/2018 2:00 PM Henry Edwards, [facility]        Today's Diagnoses     WALLACE (generalized anxiety disorder)    -  1       Follow-ups after your visit        Your next 10 appointments already scheduled     May 11, 2018  3:00 PM CDT   Return Visit with KAYLEE Rodriguez   [facility]    [address] [place] [id number] [phone]              Who to contact     If you have questions or need follow up information about today's clinic visit or your schedule please contact [facility] directly at [phone].  Normal or non-critical lab and imaging results will be communicated to you by MyChart, letter or phone within 4 business days after the clinic has received the results. If you do not hear from us within 7 days, please contact the clinic through MyChart or phone. If you have a critical or abnormal lab result, we will notify you by phone as soon as possible.  Submit refill requests through [employer] or call your pharmacy and they will forward the refill request to us. Please allow 3 business days for your refill to be completed.          Additional Information About Your Visit        MyChart Information     [employer] gives you secure access to your electronic health record. If you see a primary care provider, you can also send messages to your care team and make appointments. If you have questions, please call your primary care clinic.  If you do not have a primary care provider, please call [phone] and they will assist you.        Care EveryWhere ID     This is your Care EveryWhere ID. This could be used by other organizations to access your [place] medical records  [id number]        Your Vitals Were      Last Period                   06/11/2016            Blood Pressure from Last 3 Encounters:   04/20/18 118/68   01/10/18 109/70   11/07/17 104/66    Weight from Last 3 Encounters:   04/20/18 61.7 kg (136 lb)   01/10/18 59 kg (130 lb)   11/07/17 56.2 kg (124 lb)              Today, you had the following     No orders found for display       Primary Care Provider Office Phone # Fax #    Brooklynn Leyva [phone] [address]  [facility] [address]        Equal Access to Services     [facility]: Hadii mat ku hadasho Soomaali, waaxda luqadaha, qaybta kaalmada carlos, ania kahn . So [facility] [phone].    ATENCIÓN: Si habla español, tiene a orona disposición servicios gratuitos de asistencia lingüística. [facility] [phone].    We comply with applicable federal civil rights laws and [place] laws. We do not discriminate on the basis of race, color, national origin, age, disability, sex, sexual orientation, or gender identity.            Thank you!     Thank you for choosing [facility]  for your care. Our goal is always to provide you with excellent care. Hearing back from our patients is one way we can continue to improve our services. Please take a few minutes to complete the written survey that you may receive in the mail after your visit with us. Thank you!             Your Updated Medication List - Protect others around you: Learn how to safely use, store and throw away your medicines at www.disposemymeds.org.          This list is accurate as of 5/4/18 11:59 PM.  Always use your most recent med list.                   Brand Name Dispense Instructions for use Diagnosis    eszopiclone 1 MG Tabs tablet    LUNESTA    30 tablet    Take 1 tablet (1 mg) by mouth nightly as needed for sleep    Insomnia, unspecified type       ibuprofen 600 MG tablet    ADVIL/MOTRIN    30 tablet    Take 1 tablet (600 mg) by mouth every 6 hours as needed for pain  (mild)    Post-op pain       MULTIVITAMIN ADULT PO           traZODone 50 MG tablet    DESYREL    30 tablet    Take 1-2 tablets ( mg) by mouth nightly as needed for sleep    Insomnia, unspecified type       VITAMIN D (CHOLECALCIFEROL) PO      Take by mouth daily
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
## 2018-05-11 ENCOUNTER — OFFICE VISIT (OUTPATIENT)
Dept: BEHAVIORAL HEALTH | Facility: CLINIC | Age: 53
End: 2018-05-11
Payer: COMMERCIAL

## 2018-05-11 DIAGNOSIS — F41.1 GAD (GENERALIZED ANXIETY DISORDER): Primary | ICD-10-CM

## 2018-05-11 PROCEDURE — 90834 PSYTX W PT 45 MINUTES: CPT | Performed by: SOCIAL WORKER

## 2018-05-11 NOTE — MR AVS SNAPSHOT
After Visit Summary   5/11/2018    Aliyah Askew    MRN: [id number]           Patient Information     Date Of Birth          1965        Visit Information        Provider Department      5/11/2018 3:00 PM Henry Edwards, [facility]        Today's Diagnoses     WALLACE (generalized anxiety disorder)    -  1       Follow-ups after your visit        Your next 10 appointments already scheduled     May 18, 2018 11:00 AM CDT   Return Visit with KAYLEE Rodriguez   [facility]    [address] [place] [id number] [phone]              Who to contact     If you have questions or need follow up information about today's clinic visit or your schedule please contact [facility] directly at [phone].  Normal or non-critical lab and imaging results will be communicated to you by MyChart, letter or phone within 4 business days after the clinic has received the results. If you do not hear from us within 7 days, please contact the clinic through MyChart or phone. If you have a critical or abnormal lab result, we will notify you by phone as soon as possible.  Submit refill requests through [employer] or call your pharmacy and they will forward the refill request to us. Please allow 3 business days for your refill to be completed.          Additional Information About Your Visit        MyChart Information     [employer] gives you secure access to your electronic health record. If you see a primary care provider, you can also send messages to your care team and make appointments. If you have questions, please call your primary care clinic.  If you do not have a primary care provider, please call [phone] and they will assist you.        Care EveryWhere ID     This is your Care EveryWhere ID. This could be used by other organizations to access your [place] medical records  [id number]        Your Vitals Were      Last Period                   06/11/2016            Blood Pressure from Last 3 Encounters:   04/20/18 118/68   01/10/18 109/70   11/07/17 104/66    Weight from Last 3 Encounters:   04/20/18 61.7 kg (136 lb)   01/10/18 59 kg (130 lb)   11/07/17 56.2 kg (124 lb)              Today, you had the following     No orders found for display       Primary Care Provider Office Phone # Fax #    Brooklynn Leyva [phone] [address]  [facility] [address]        Equal Access to Services     [facility]: Hadii mat lua hadasho Somanda, waaxda luqadaha, qaybta kaalmada carlos, ania kahn . So [facility] [phone].    ATENCIÓN: Si habla español, tiene a orona disposición servicios gratuitos de asistencia lingüística. [facility] [phone].    We comply with applicable federal civil rights laws and [place] laws. We do not discriminate on the basis of race, color, national origin, age, disability, sex, sexual orientation, or gender identity.            Thank you!     Thank you for choosing [facility]  for your care. Our goal is always to provide you with excellent care. Hearing back from our patients is one way we can continue to improve our services. Please take a few minutes to complete the written survey that you may receive in the mail after your visit with us. Thank you!             Your Updated Medication List - Protect others around you: Learn how to safely use, store and throw away your medicines at www.disposemymeds.org.          This list is accurate as of 5/11/18 11:59 PM.  Always use your most recent med list.                   Brand Name Dispense Instructions for use Diagnosis    eszopiclone 1 MG Tabs tablet    LUNESTA    30 tablet    Take 1 tablet (1 mg) by mouth nightly as needed for sleep    Insomnia, unspecified type       ibuprofen 600 MG tablet    ADVIL/MOTRIN    30 tablet    Take 1 tablet (600 mg) by mouth every 6 hours as needed for pain  (mild)    Post-op pain       MULTIVITAMIN ADULT PO           traZODone 50 MG tablet    DESYREL    30 tablet    Take 1-2 tablets ( mg) by mouth nightly as needed for sleep    Insomnia, unspecified type       VITAMIN D (CHOLECALCIFEROL) PO      Take by mouth daily

## 2018-05-18 ENCOUNTER — OFFICE VISIT (OUTPATIENT)
Dept: BEHAVIORAL HEALTH | Facility: CLINIC | Age: 53
End: 2018-05-18
Payer: COMMERCIAL

## 2018-05-18 DIAGNOSIS — F41.1 GAD (GENERALIZED ANXIETY DISORDER): Primary | ICD-10-CM

## 2018-05-18 PROCEDURE — 90837 PSYTX W PT 60 MINUTES: CPT | Performed by: SOCIAL WORKER

## 2018-05-18 NOTE — MR AVS SNAPSHOT
After Visit Summary   5/18/2018    Aliyah Askew    MRN: [id number]           Patient Information     Date Of Birth          1965        Visit Information        Provider Department      5/18/2018 11:00 AM Henry Edwards, [facility]        Today's Diagnoses     WALLACE (generalized anxiety disorder)    -  1       Follow-ups after your visit        Who to contact     If you have questions or need follow up information about today's clinic visit or your schedule please contact [facility] directly at [phone].  Normal or non-critical lab and imaging results will be communicated to you by Chargebackhart, letter or phone within 4 business days after the clinic has received the results. If you do not hear from us within 7 days, please contact the clinic through [employer] or phone. If you have a critical or abnormal lab result, we will notify you by phone as soon as possible.  Submit refill requests through [employer] or call your pharmacy and they will forward the refill request to us. Please allow 3 business days for your refill to be completed.          Additional Information About Your Visit        MyChart Information     [employer] gives you secure access to your electronic health record. If you see a primary care provider, you can also send messages to your care team and make appointments. If you have questions, please call your primary care clinic.  If you do not have a primary care provider, please call [phone] and they will assist you.        Care EveryWhere ID     This is your Care EveryWhere ID. This could be used by other organizations to access your [place] medical records  [id number]        Your Vitals Were     Last Period                   06/11/2016            Blood Pressure from Last 3 Encounters:   04/20/18 118/68   01/10/18 109/70   11/07/17 104/66    Weight from Last 3 Encounters:   04/20/18 61.7 kg (136 lb)   01/10/18 59 kg (130 lb)   11/07/17 56.2 kg  (124 lb)              Today, you had the following     No orders found for display       Primary Care Provider Office Phone # Fax #    Brooklynn Leyva [phone] [address]  [facility] [address]        Equal Access to Services     KRYSTAL RENDON : Hadii mat ku hadmagoo Soamosali, waaxda luqadaha, qaybta kaalmada adeegyada, ania patricioin hayaan sulemancornel robledo criss brown. So [facility] [phone].    ATENCIÓN: Si habla español, tiene a orona disposición servicios gratuitos de asistencia lingüística. Llame al [phone].    We comply with applicable federal civil rights laws and [place] laws. We do not discriminate on the basis of race, color, national origin, age, disability, sex, sexual orientation, or gender identity.            Thank you!     Thank you for choosing [facility]  for your care. Our goal is always to provide you with excellent care. Hearing back from our patients is one way we can continue to improve our services. Please take a few minutes to complete the written survey that you may receive in the mail after your visit with us. Thank you!             Your Updated Medication List - Protect others around you: Learn how to safely use, store and throw away your medicines at www.disposemymeds.org.          This list is accurate as of 5/18/18  1:17 PM.  Always use your most recent med list.                   Brand Name Dispense Instructions for use Diagnosis    eszopiclone 1 MG Tabs tablet    LUNESTA    30 tablet    Take 1 tablet (1 mg) by mouth nightly as needed for sleep    Insomnia, unspecified type       ibuprofen 600 MG tablet    ADVIL/MOTRIN    30 tablet    Take 1 tablet (600 mg) by mouth every 6 hours as needed for pain (mild)    Post-op pain       MULTIVITAMIN ADULT PO           traZODone 50 MG tablet    DESYREL    30 tablet    Take 1-2 tablets ( mg) by mouth nightly as needed for sleep    Insomnia, unspecified type       VITAMIN D (CHOLECALCIFEROL) PO      Take  by mouth daily
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
## 2018-05-23 ENCOUNTER — OFFICE VISIT (OUTPATIENT)
Dept: BEHAVIORAL HEALTH | Facility: CLINIC | Age: 53
End: 2018-05-23
Payer: COMMERCIAL

## 2018-05-23 DIAGNOSIS — F33.1 MODERATE EPISODE OF RECURRENT MAJOR DEPRESSIVE DISORDER (H): Primary | ICD-10-CM

## 2018-05-23 PROCEDURE — 90834 PSYTX W PT 45 MINUTES: CPT | Performed by: SOCIAL WORKER
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
## 2018-05-23 NOTE — MR AVS SNAPSHOT
After Visit Summary   5/23/2018    Aliyah Askew    MRN: [id number]           Patient Information     Date Of Birth          1965        Visit Information        Provider Department      5/23/2018 10:30 AM Henry Edwards, [facility]        Today's Diagnoses     Moderate episode of recurrent major depressive disorder (H)    -  1       Follow-ups after your visit        Who to contact     If you have questions or need follow up information about today's clinic visit or your schedule please contact [facility] directly at [phone].  Normal or non-critical lab and imaging results will be communicated to you by [employer], letter or phone within 4 business days after the clinic has received the results. If you do not hear from us within 7 days, please contact the clinic through [employer] or phone. If you have a critical or abnormal lab result, we will notify you by phone as soon as possible.  Submit refill requests through [employer] or call your pharmacy and they will forward the refill request to us. Please allow 3 business days for your refill to be completed.          Additional Information About Your Visit        MyChart Information     [employer] gives you secure access to your electronic health record. If you see a primary care provider, you can also send messages to your care team and make appointments. If you have questions, please call your primary care clinic.  If you do not have a primary care provider, please call [phone] and they will assist you.        Care EveryWhere ID     This is your Care EveryWhere ID. This could be used by other organizations to access your [place] medical records  [id number]        Your Vitals Were     Last Period                   06/11/2016            Blood Pressure from Last 3 Encounters:   04/20/18 118/68   01/10/18 109/70   11/07/17 104/66    Weight from Last 3 Encounters:   04/20/18 61.7 kg (136 lb)   01/10/18 59 kg  (130 lb)   11/07/17 56.2 kg (124 lb)              Today, you had the following     No orders found for display       Primary Care Provider Office Phone # Fax #    Brooklynn Leyva [phone] [address]  [facility] [address]        Equal Access to Services     [facility] JUSTICE : Hadii aad ku hadasho Soomaali, waaxda luqadaha, qaybta kaalmada adeegyada, waxay patricioin hayalinen adecornel joseroxy kahn . So [facility] [phone].    ATENCIÓN: Si habla español, tiene a orona disposición servicios gratuitos de asistencia lingüística. [facility] [phone].    We comply with applicable federal civil rights laws and [place] laws. We do not discriminate on the basis of race, color, national origin, age, disability, sex, sexual orientation, or gender identity.            Thank you!     Thank you for choosing [facility]  for your care. Our goal is always to provide you with excellent care. Hearing back from our patients is one way we can continue to improve our services. Please take a few minutes to complete the written survey that you may receive in the mail after your visit with us. Thank you!             Your Updated Medication List - Protect others around you: Learn how to safely use, store and throw away your medicines at www.disposemymeds.org.          This list is accurate as of 5/23/18  2:59 PM.  Always use your most recent med list.                   Brand Name Dispense Instructions for use Diagnosis    eszopiclone 1 MG Tabs tablet    LUNESTA    30 tablet    Take 1 tablet (1 mg) by mouth nightly as needed for sleep    Insomnia, unspecified type       ibuprofen 600 MG tablet    ADVIL/MOTRIN    30 tablet    Take 1 tablet (600 mg) by mouth every 6 hours as needed for pain (mild)    Post-op pain       MULTIVITAMIN ADULT PO           traZODone 50 MG tablet    DESYREL    30 tablet    Take 1-2 tablets ( mg) by mouth nightly as needed for sleep    Insomnia, unspecified type       VITAMIN D  (CHOLECALCIFEROL) PO      Take by mouth daily

## 2018-06-22 ENCOUNTER — OFFICE VISIT (OUTPATIENT)
Dept: BEHAVIORAL HEALTH | Facility: CLINIC | Age: 53
End: 2018-06-22
Payer: COMMERCIAL

## 2018-06-22 DIAGNOSIS — F41.1 GAD (GENERALIZED ANXIETY DISORDER): Primary | ICD-10-CM

## 2018-06-22 PROCEDURE — 90837 PSYTX W PT 60 MINUTES: CPT | Performed by: SOCIAL WORKER

## 2018-06-22 NOTE — MR AVS SNAPSHOT
After Visit Summary   6/22/2018    Aliyah Askew    MRN: [id number]           Patient Information     Date Of Birth          1965        Visit Information        Provider Department      6/22/2018 12:30 PM Henry Edwards [facility]        Today's Diagnoses     WALLACE (generalized anxiety disorder)    -  1       Follow-ups after your visit        Your next 10 appointments already scheduled     Jun 27, 2018 10:30 AM CDT   [facility] Visit with KAYLEE Rodriguez   [facility]    [address] [place] [id number] [phone]              Who to contact     If you have questions or need follow up information about today's clinic visit or your schedule please contact [facility] directly at [phone].  Normal or non-critical lab and imaging results will be communicated to you by MyChart, letter or phone within 4 business days after the clinic has received the results. If you do not hear from us within 7 days, please contact the clinic through MyChart or phone. If you have a critical or abnormal lab result, we will notify you by phone as soon as possible.  Submit refill requests through [employer] or call your pharmacy and they will forward the refill request to us. Please allow 3 business days for your refill to be completed.          Additional Information About Your Visit        MyChart Information     [employer] gives you secure access to your electronic health record. If you see a primary care provider, you can also send messages to your care team and make appointments. If you have questions, please call your primary care clinic.  If you do not have a primary care provider, please call [phone] and they will assist you.        Care EveryWhere ID     This is your Care EveryWhere ID. This could be used by other organizations to access your [place] medical records  [id number]        Your Vitals Were      Last Period                   06/11/2016            Blood Pressure from Last 3 Encounters:   04/20/18 118/68   01/10/18 109/70   11/07/17 104/66    Weight from Last 3 Encounters:   04/20/18 61.7 kg (136 lb)   01/10/18 59 kg (130 lb)   11/07/17 56.2 kg (124 lb)              Today, you had the following     No orders found for display       Primary Care Provider Office Phone # Fax #    Brooklynn Leyva [phone] [address]  [facility] [address]        Equal Access to Services     [facility]: Hadii mat lua hadasho Somanda, waaxda luqadaha, qaybta kaalmada carlos, ania kahn . So [facility] [phone].    ATENCIÓN: Si habla español, tiene a orona disposición servicios gratuitos de asistencia lingüística. [facility] [phone].    We comply with applicable federal civil rights laws and [place] laws. We do not discriminate on the basis of race, color, national origin, age, disability, sex, sexual orientation, or gender identity.            Thank you!     Thank you for choosing [facility]  for your care. Our goal is always to provide you with excellent care. Hearing back from our patients is one way we can continue to improve our services. Please take a few minutes to complete the written survey that you may receive in the mail after your visit with us. Thank you!             Your Updated Medication List - Protect others around you: Learn how to safely use, store and throw away your medicines at www.disposemymeds.org.          This list is accurate as of 6/22/18 11:59 PM.  Always use your most recent med list.                   Brand Name Dispense Instructions for use Diagnosis    eszopiclone 1 MG Tabs tablet    LUNESTA    30 tablet    Take 1 tablet (1 mg) by mouth nightly as needed for sleep    Insomnia, unspecified type       ibuprofen 600 MG tablet    ADVIL/MOTRIN    30 tablet    Take 1 tablet (600 mg) by mouth every 6 hours as needed for pain  (mild)    Post-op pain       MULTIVITAMIN ADULT PO           traZODone 50 MG tablet    DESYREL    30 tablet    Take 1-2 tablets ( mg) by mouth nightly as needed for sleep    Insomnia, unspecified type       VITAMIN D (CHOLECALCIFEROL) PO      Take by mouth daily
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
## 2018-07-02 ENCOUNTER — OFFICE VISIT (OUTPATIENT)
Dept: BEHAVIORAL HEALTH | Facility: CLINIC | Age: 53
End: 2018-07-02
Payer: COMMERCIAL

## 2018-07-02 DIAGNOSIS — F41.1 GAD (GENERALIZED ANXIETY DISORDER): Primary | ICD-10-CM

## 2018-07-02 PROCEDURE — 90837 PSYTX W PT 60 MINUTES: CPT | Performed by: SOCIAL WORKER
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
## 2018-07-02 NOTE — MR AVS SNAPSHOT
After Visit Summary   7/2/2018    Aliyah Askew    MRN: [id number]           Patient Information     Date Of Birth          1965        Visit Information        Provider Department      7/2/2018 12:30 PM Henry Edwards, [facility]        Today's Diagnoses     WALLACE (generalized anxiety disorder)    -  1       Follow-ups after your visit        Who to contact     If you have questions or need follow up information about today's clinic visit or your schedule please contact [facility] directly at [phone].  Normal or non-critical lab and imaging results will be communicated to you by ticcklehart, letter or phone within 4 business days after the clinic has received the results. If you do not hear from us within 7 days, please contact the clinic through Cerost or phone. If you have a critical or abnormal lab result, we will notify you by phone as soon as possible.  Submit refill requests through Zoe Majeste or call your pharmacy and they will forward the refill request to us. Please allow 3 business days for your refill to be completed.          Additional Information About Your Visit        MyChart Information     Zoe Majeste gives you secure access to your electronic health record. If you see a primary care provider, you can also send messages to your care team and make appointments. If you have questions, please call your primary care clinic.  If you do not have a primary care provider, please call [phone] and they will assist you.        Care EveryWhere ID     This is your Care EveryWhere ID. This could be used by other organizations to access your [place] medical records  [id number]        Your Vitals Were     Last Period                   06/11/2016            Blood Pressure from Last 3 Encounters:   04/20/18 118/68   01/10/18 109/70   11/07/17 104/66    Weight from Last 3 Encounters:   04/20/18 61.7 kg (136 lb)   01/10/18 59 kg (130 lb)   11/07/17 56.2 kg  (124 lb)              Today, you had the following     No orders found for display       Primary Care Provider Office Phone # Fax #    Brooklynn Leyva [phone] [address]  [facility] [address]        Equal Access to Services     KRYSTAL RENDON : Hadii mat ku hadmagoo Soamosali, waaxda luqadaha, qaybta kaalmada adeegyada, ania patricioin hayaan sulemancornel robledo criss brown. So [facility] [phone].    ATENCIÓN: Si habla español, tiene a orona disposición servicios gratuitos de asistencia lingüística. Llame al [phone].    We comply with applicable federal civil rights laws and [place] laws. We do not discriminate on the basis of race, color, national origin, age, disability, sex, sexual orientation, or gender identity.            Thank you!     Thank you for choosing [facility]  for your care. Our goal is always to provide you with excellent care. Hearing back from our patients is one way we can continue to improve our services. Please take a few minutes to complete the written survey that you may receive in the mail after your visit with us. Thank you!             Your Updated Medication List - Protect others around you: Learn how to safely use, store and throw away your medicines at www.disposemymeds.org.          This list is accurate as of 7/2/18  1:51 PM.  Always use your most recent med list.                   Brand Name Dispense Instructions for use Diagnosis    eszopiclone 1 MG Tabs tablet    LUNESTA    30 tablet    Take 1 tablet (1 mg) by mouth nightly as needed for sleep    Insomnia, unspecified type       ibuprofen 600 MG tablet    ADVIL/MOTRIN    30 tablet    Take 1 tablet (600 mg) by mouth every 6 hours as needed for pain (mild)    Post-op pain       MULTIVITAMIN ADULT PO           traZODone 50 MG tablet    DESYREL    30 tablet    Take 1-2 tablets ( mg) by mouth nightly as needed for sleep    Insomnia, unspecified type       VITAMIN D (CHOLECALCIFEROL) PO      Take  by mouth daily

## 2018-07-13 ENCOUNTER — OFFICE VISIT (OUTPATIENT)
Dept: BEHAVIORAL HEALTH | Facility: CLINIC | Age: 53
End: 2018-07-13
Payer: COMMERCIAL

## 2018-07-13 DIAGNOSIS — F41.1 GAD (GENERALIZED ANXIETY DISORDER): Primary | ICD-10-CM

## 2018-07-13 PROCEDURE — 90834 PSYTX W PT 45 MINUTES: CPT | Performed by: SOCIAL WORKER

## 2018-07-13 NOTE — MR AVS SNAPSHOT
After Visit Summary   7/13/2018    Aliyah Askew    MRN: [id number]           Patient Information     Date Of Birth          1965        Visit Information        Provider Department      7/13/2018 3:00 PM Henry Edwards, [facility]        Today's Diagnoses     WALLACE (generalized anxiety disorder)    -  1       Follow-ups after your visit        Who to contact     If you have questions or need follow up information about today's clinic visit or your schedule please contact [facility] directly at [phone].  Normal or non-critical lab and imaging results will be communicated to you by Leadwerkshart, letter or phone within 4 business days after the clinic has received the results. If you do not hear from us within 7 days, please contact the clinic through Bocadat or phone. If you have a critical or abnormal lab result, we will notify you by phone as soon as possible.  Submit refill requests through Panviva or call your pharmacy and they will forward the refill request to us. Please allow 3 business days for your refill to be completed.          Additional Information About Your Visit        MyChart Information     Panviva gives you secure access to your electronic health record. If you see a primary care provider, you can also send messages to your care team and make appointments. If you have questions, please call your primary care clinic.  If you do not have a primary care provider, please call [phone] and they will assist you.        Care EveryWhere ID     This is your Care EveryWhere ID. This could be used by other organizations to access your [place] medical records  [id number]        Your Vitals Were     Last Period                   06/11/2016            Blood Pressure from Last 3 Encounters:   04/20/18 118/68   01/10/18 109/70   11/07/17 104/66    Weight from Last 3 Encounters:   04/20/18 61.7 kg (136 lb)   01/10/18 59 kg (130 lb)   11/07/17 56.2 kg  (124 lb)              Today, you had the following     No orders found for display       Primary Care Provider Office Phone # Fax #    Brooklynn Leyva [phone] [address]  [facility] [address]        Equal Access to Services     KRYSTAL RENDON : Hadii mat lua hadmagoo Soamosali, waaxda luqadaha, qaybta kaalmada adeegyada, ania patricioin hayaan sulemancornel robledo criss brown. So [facility] [phone].    ATENCIÓN: Si habla español, tiene a orona disposición servicios gratuitos de asistencia lingüística. Llame al [phone].    We comply with applicable federal civil rights laws and [place] laws. We do not discriminate on the basis of race, color, national origin, age, disability, sex, sexual orientation, or gender identity.            Thank you!     Thank you for choosing [facility]  for your care. Our goal is always to provide you with excellent care. Hearing back from our patients is one way we can continue to improve our services. Please take a few minutes to complete the written survey that you may receive in the mail after your visit with us. Thank you!             Your Updated Medication List - Protect others around you: Learn how to safely use, store and throw away your medicines at www.disposemymeds.org.          This list is accurate as of 7/13/18 11:59 PM.  Always use your most recent med list.                   Brand Name Dispense Instructions for use Diagnosis    eszopiclone 1 MG Tabs tablet    LUNESTA    30 tablet    Take 1 tablet (1 mg) by mouth nightly as needed for sleep    Insomnia, unspecified type       ibuprofen 600 MG tablet    ADVIL/MOTRIN    30 tablet    Take 1 tablet (600 mg) by mouth every 6 hours as needed for pain (mild)    Post-op pain       MULTIVITAMIN ADULT PO           traZODone 50 MG tablet    DESYREL    30 tablet    Take 1-2 tablets ( mg) by mouth nightly as needed for sleep    Insomnia, unspecified type       VITAMIN D (CHOLECALCIFEROL) PO      Take  by mouth daily
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
## 2018-07-26 ENCOUNTER — OFFICE VISIT (OUTPATIENT)
Dept: BEHAVIORAL HEALTH | Facility: CLINIC | Age: 53
End: 2018-07-26
Payer: COMMERCIAL

## 2018-07-26 DIAGNOSIS — F41.1 GAD (GENERALIZED ANXIETY DISORDER): Primary | ICD-10-CM

## 2018-07-26 PROCEDURE — 90834 PSYTX W PT 45 MINUTES: CPT | Performed by: SOCIAL WORKER

## 2018-07-26 NOTE — MR AVS SNAPSHOT
After Visit Summary   7/26/2018    Aliyah Askew    MRN: [id number]           Patient Information     Date Of Birth          1965        Visit Information        Provider Department      7/26/2018 10:30 AM Henry Edwards, [facility]        Today's Diagnoses     WALLACE (generalized anxiety disorder)    -  1       Follow-ups after your visit        Who to contact     If you have questions or need follow up information about today's clinic visit or your schedule please contact [facility] directly at [phone].  Normal or non-critical lab and imaging results will be communicated to you by [employer], letter or phone within 4 business days after the clinic has received the results. If you do not hear from us within 7 days, please contact the clinic through [email or web address] or phone. If you have a critical or abnormal lab result, we will notify you by phone as soon as possible.  Submit refill requests through [employer] or call your pharmacy and they will forward the refill request to us. Please allow 3 business days for your refill to be completed.          Additional Information About Your Visit        MyChart Information     [employer] gives you secure access to your electronic health record. If you see a primary care provider, you can also send messages to your care team and make appointments. If you have questions, please call your primary care clinic.  If you do not have a primary care provider, please call [phone] and they will assist you.        Care EveryWhere ID     This is your Care EveryWhere ID. This could be used by other organizations to access your [place] medical records  [id number]        Your Vitals Were     Last Period                   06/11/2016            Blood Pressure from Last 3 Encounters:   04/20/18 118/68   01/10/18 109/70   11/07/17 104/66    Weight from Last 3 Encounters:   04/20/18 61.7 kg (136 lb)   01/10/18 59 kg (130 lb)   11/07/17 56.2 kg  (124 lb)              Today, you had the following     No orders found for display       Primary Care Provider Office Phone # Fax #    Brooklynn Leyva [phone] [address]  [facility] [address]        Equal Access to Services     KRYSTAL RENDON : Hadii mat ku hadmagoo Soamosali, waaxda luqadaha, qaybta kaalmada adeegyada, ania patricioin hayaan sulemancornel robledo criss brown. So [facility] [phone].    ATENCIÓN: Si habla español, tiene a orona disposición servicios gratuitos de asistencia lingüística. Llame al [phone].    We comply with applicable federal civil rights laws and [place] laws. We do not discriminate on the basis of race, color, national origin, age, disability, sex, sexual orientation, or gender identity.            Thank you!     Thank you for choosing [facility]  for your care. Our goal is always to provide you with excellent care. Hearing back from our patients is one way we can continue to improve our services. Please take a few minutes to complete the written survey that you may receive in the mail after your visit with us. Thank you!             Your Updated Medication List - Protect others around you: Learn how to safely use, store and throw away your medicines at www.disposemymeds.org.          This list is accurate as of 7/26/18 11:59 PM.  Always use your most recent med list.                   Brand Name Dispense Instructions for use Diagnosis    eszopiclone 1 MG Tabs tablet    LUNESTA    30 tablet    Take 1 tablet (1 mg) by mouth nightly as needed for sleep    Insomnia, unspecified type       ibuprofen 600 MG tablet    ADVIL/MOTRIN    30 tablet    Take 1 tablet (600 mg) by mouth every 6 hours as needed for pain (mild)    Post-op pain       MULTIVITAMIN ADULT PO           traZODone 50 MG tablet    DESYREL    30 tablet    Take 1-2 tablets ( mg) by mouth nightly as needed for sleep    Insomnia, unspecified type       VITAMIN D (CHOLECALCIFEROL) PO      Take  by mouth daily
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
## 2018-08-16 ENCOUNTER — OFFICE VISIT (OUTPATIENT)
Dept: BEHAVIORAL HEALTH | Facility: CLINIC | Age: 53
End: 2018-08-16
Payer: COMMERCIAL

## 2018-08-16 DIAGNOSIS — F41.1 GAD (GENERALIZED ANXIETY DISORDER): Primary | ICD-10-CM

## 2018-08-16 PROCEDURE — 90834 PSYTX W PT 45 MINUTES: CPT | Performed by: SOCIAL WORKER

## 2018-08-16 NOTE — MR AVS SNAPSHOT
After Visit Summary   8/16/2018    Aliyah Askew    MRN: [id number]           Patient Information     Date Of Birth          1965        Visit Information        Provider Department      8/16/2018 11:00 AM [facility], [facility]        Today's Diagnoses     WALLACE (generalized anxiety disorder)    -  1       Follow-ups after your visit        Who to contact     If you have questions or need follow up information about today's clinic visit or your schedule please contact [facility] directly at [phone].  Normal or non-critical lab and imaging results will be communicated to you by Sarnovahart, letter or phone within 4 business days after the clinic has received the results. If you do not hear from us within 7 days, please contact the clinic through GCWt or phone. If you have a critical or abnormal lab result, we will notify you by phone as soon as possible.  Submit refill requests through [employer]izens or call your pharmacy and they will forward the refill request to us. Please allow 3 business days for your refill to be completed.          Additional Information About Your Visit        MyChart Information     [employer] gives you secure access to your electronic health record. If you see a primary care provider, you can also send messages to your care team and make appointments. If you have questions, please call your primary care clinic.  If you do not have a primary care provider, please call [phone] and they will assist you.        Care EveryWhere ID     This is your Care EveryWhere ID. This could be used by other organizations to access your [place] medical records  [id number]        Your Vitals Were     Last Period                   06/11/2016            Blood Pressure from Last 3 Encounters:   04/20/18 118/68   01/10/18 109/70   11/07/17 104/66    Weight from Last 3 Encounters:   04/20/18 61.7 kg (136 lb)   01/10/18 59 kg (130 lb)   11/07/17 56.2 kg  (124 lb)              Today, you had the following     No orders found for display       Primary Care Provider Office Phone # Fax #    Brooklynn Leyva [phone] [address]  [facility] [address]        Equal Access to Services     KRYSTAL RENDON : Hadii mat ku hadmagoo Soamosali, waaxda luqadaha, qaybta kaalmada adeegyada, ania patricioin hayaan sulemancornel robledo criss brown. So [facility] [phone].    ATENCIÓN: Si habla español, tiene a orona disposición servicios gratuitos de asistencia lingüística. Llame al [phone].    We comply with applicable federal civil rights laws and [place] laws. We do not discriminate on the basis of race, color, national origin, age, disability, sex, sexual orientation, or gender identity.            Thank you!     Thank you for choosing [facility]  for your care. Our goal is always to provide you with excellent care. Hearing back from our patients is one way we can continue to improve our services. Please take a few minutes to complete the written survey that you may receive in the mail after your visit with us. Thank you!             Your Updated Medication List - Protect others around you: Learn how to safely use, store and throw away your medicines at www.disposemymeds.org.          This list is accurate as of 8/16/18 11:59 PM.  Always use your most recent med list.                   Brand Name Dispense Instructions for use Diagnosis    eszopiclone 1 MG Tabs tablet    LUNESTA    30 tablet    Take 1 tablet (1 mg) by mouth nightly as needed for sleep    Insomnia, unspecified type       ibuprofen 600 MG tablet    ADVIL/MOTRIN    30 tablet    Take 1 tablet (600 mg) by mouth every 6 hours as needed for pain (mild)    Post-op pain       MULTIVITAMIN ADULT PO           traZODone 50 MG tablet    DESYREL    30 tablet    Take 1-2 tablets ( mg) by mouth nightly as needed for sleep    Insomnia, unspecified type       VITAMIN D (CHOLECALCIFEROL) PO      Take  by mouth daily
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
## 2018-08-31 ENCOUNTER — OFFICE VISIT (OUTPATIENT)
Dept: BEHAVIORAL HEALTH | Facility: CLINIC | Age: 53
End: 2018-08-31
Payer: COMMERCIAL

## 2018-08-31 DIAGNOSIS — F41.1 GAD (GENERALIZED ANXIETY DISORDER): Primary | ICD-10-CM

## 2018-08-31 PROCEDURE — 90837 PSYTX W PT 60 MINUTES: CPT | Performed by: SOCIAL WORKER

## 2018-08-31 NOTE — MR AVS SNAPSHOT
After Visit Summary   8/31/2018    Aliyah Askew    MRN: [id number]           Patient Information     Date Of Birth          1965        Visit Information        Provider Department      8/31/2018 2:00 PM Henry Edwards, [facility]        Today's Diagnoses     WALLACE (generalized anxiety disorder)    -  1       Follow-ups after your visit        Your next 10 appointments already scheduled     Sep 04, 2018 10:30 AM CDT   Return Visit with KAYLEE Rodriguez   [facility] ([facility])    [address] [place] [id number] [phone]              Who to contact     If you have questions or need follow up information about today's clinic visit or your schedule please contact [facility] directly at [phone].  Normal or non-critical lab and imaging results will be communicated to you by MyChart, letter or phone within 4 business days after the clinic has received the results. If you do not hear from us within 7 days, please contact the clinic through MyChart or phone. If you have a critical or abnormal lab result, we will notify you by phone as soon as possible.  Submit refill requests through [employer] or call your pharmacy and they will forward the refill request to us. Please allow 3 business days for your refill to be completed.          Additional Information About Your Visit        MyChart Information     [employer] gives you secure access to your electronic health record. If you see a primary care provider, you can also send messages to your care team and make appointments. If you have questions, please call your primary care clinic.  If you do not have a primary care provider, please call [phone] and they will assist you.        Care EveryWhere ID     This is your Care EveryWhere ID. This could be used by other organizations to access your [facility] medical records  [id number]        Your Vitals Were      Last Period                   06/11/2016            Blood Pressure from Last 3 Encounters:   04/20/18 118/68   01/10/18 109/70   11/07/17 104/66    Weight from Last 3 Encounters:   04/20/18 61.7 kg (136 lb)   01/10/18 59 kg (130 lb)   11/07/17 56.2 kg (124 lb)              Today, you had the following     No orders found for display       Primary Care Provider Office Phone # Fax #    Brooklynn Leyva [phone] [address]  [facility] [address]        Equal Access to Services     [facility]: Hadii mat lua hadasho Somanda, waaxda luqadaha, qaybta kaalmada carlos, ania kahn . So [facility] [phone].    ATENCIÓN: Si habla español, tiene a orona disposición servicios gratuitos de asistencia lingüística. [facility] [phone].    We comply with applicable federal civil rights laws and [place] laws. We do not discriminate on the basis of race, color, national origin, age, disability, sex, sexual orientation, or gender identity.            Thank you!     Thank you for choosing [facility]  for your care. Our goal is always to provide you with excellent care. Hearing back from our patients is one way we can continue to improve our services. Please take a few minutes to complete the written survey that you may receive in the mail after your visit with us. Thank you!             Your Updated Medication List - Protect others around you: Learn how to safely use, store and throw away your medicines at www.disposemymeds.org.          This list is accurate as of 8/31/18  3:24 PM.  Always use your most recent med list.                   Brand Name Dispense Instructions for use Diagnosis    eszopiclone 1 MG Tabs tablet    LUNESTA    30 tablet    Take 1 tablet (1 mg) by mouth nightly as needed for sleep    Insomnia, unspecified type       ibuprofen 600 MG tablet    ADVIL/MOTRIN    30 tablet    Take 1 tablet (600 mg) by mouth every 6 hours as needed for pain  (mild)    Post-op pain       MULTIVITAMIN ADULT PO           traZODone 50 MG tablet    DESYREL    30 tablet    Take 1-2 tablets ( mg) by mouth nightly as needed for sleep    Insomnia, unspecified type       VITAMIN D (CHOLECALCIFEROL) PO      Take by mouth daily
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
## 2018-09-04 ENCOUNTER — OFFICE VISIT (OUTPATIENT)
Dept: BEHAVIORAL HEALTH | Facility: CLINIC | Age: 53
End: 2018-09-04
Payer: COMMERCIAL

## 2018-09-04 DIAGNOSIS — F41.1 GAD (GENERALIZED ANXIETY DISORDER): Primary | ICD-10-CM

## 2018-09-04 PROCEDURE — 90834 PSYTX W PT 45 MINUTES: CPT | Performed by: SOCIAL WORKER

## 2018-09-04 NOTE — MR AVS SNAPSHOT
After Visit Summary   9/4/2018    Aliyah Askew    MRN: [id number]           Patient Information     Date Of Birth          1965        Visit Information        Provider Department      9/4/2018 10:30 AM Henry Edwards, [facility]        Today's Diagnoses     WALLACE (generalized anxiety disorder)    -  1       Follow-ups after your visit        Who to contact     If you have questions or need follow up information about today's clinic visit or your schedule please contact [facility] directly at [phone].  Normal or non-critical lab and imaging results will be communicated to you by Ecri[employer]art, letter or phone within 4 business days after the clinic has received the results. If you do not hear from us within 7 days, please contact the clinic through [employer] or phone. If you have a critical or abnormal lab result, we will notify you by phone as soon as possible.  Submit refill requests through [employer] or call your pharmacy and they will forward the refill request to us. Please allow 3 business days for your refill to be completed.          Additional Information About Your Visit        MyChart Information     [employer] gives you secure access to your electronic health record. If you see a primary care provider, you can also send messages to your care team and make appointments. If you have questions, please call your primary care clinic.  If you do not have a primary care provider, please call [phone] and they will assist you.        Care EveryWhere ID     This is your Care EveryWhere ID. This could be used by other organizations to access your [place] medical records  [id number]        Your Vitals Were     Last Period                   06/11/2016            Blood Pressure from Last 3 Encounters:   04/20/18 118/68   01/10/18 109/70   11/07/17 104/66    Weight from Last 3 Encounters:   04/20/18 61.7 kg (136 lb)   01/10/18 59 kg (130 lb)   11/07/17 56.2 kg  (124 lb)              Today, you had the following     No orders found for display       Primary Care Provider Office Phone # Fax #    Brooklynn Leyva [phone] [address]  [facility] [address]        Equal Access to Services     KRYSTAL RENDON : Hadii mat ku hadmagoo Soamosali, waaxda luqadaha, qaybta kaalmada adeegyada, ania patricioin hayaan sulemancornel robledo criss brown. So [facility] [phone].    ATENCIÓN: Si habla español, tiene a orona disposición servicios gratuitos de asistencia lingüística. Llame al [phone].    We comply with applicable federal civil rights laws and [place] laws. We do not discriminate on the basis of race, color, national origin, age, disability, sex, sexual orientation, or gender identity.            Thank you!     Thank you for choosing [facility]  for your care. Our goal is always to provide you with excellent care. Hearing back from our patients is one way we can continue to improve our services. Please take a few minutes to complete the written survey that you may receive in the mail after your visit with us. Thank you!             Your Updated Medication List - Protect others around you: Learn how to safely use, store and throw away your medicines at www.disposemymeds.org.          This list is accurate as of 9/4/18 11:59 PM.  Always use your most recent med list.                   Brand Name Dispense Instructions for use Diagnosis    eszopiclone 1 MG Tabs tablet    LUNESTA    30 tablet    Take 1 tablet (1 mg) by mouth nightly as needed for sleep    Insomnia, unspecified type       ibuprofen 600 MG tablet    ADVIL/MOTRIN    30 tablet    Take 1 tablet (600 mg) by mouth every 6 hours as needed for pain (mild)    Post-op pain       MULTIVITAMIN ADULT PO           traZODone 50 MG tablet    DESYREL    30 tablet    Take 1-2 tablets ( mg) by mouth nightly as needed for sleep    Insomnia, unspecified type       VITAMIN D (CHOLECALCIFEROL) PO      Take  by mouth daily
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
## 2018-09-24 ENCOUNTER — OFFICE VISIT (OUTPATIENT)
Dept: BEHAVIORAL HEALTH | Facility: CLINIC | Age: 53
End: 2018-09-24
Payer: COMMERCIAL

## 2018-09-24 DIAGNOSIS — F41.1 GAD (GENERALIZED ANXIETY DISORDER): Primary | ICD-10-CM

## 2018-09-24 PROCEDURE — 90837 PSYTX W PT 60 MINUTES: CPT | Performed by: SOCIAL WORKER
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
## 2018-09-24 NOTE — MR AVS SNAPSHOT
After Visit Summary   9/24/2018    Aliyah Askew    MRN: [id number]           Patient Information     Date Of Birth          1965        Visit Information        Provider Department      9/24/2018 12:30 PM Henry Edwards, [facility]        Today's Diagnoses     WALLACE (generalized anxiety disorder)    -  1       Follow-ups after your visit        Your next 10 appointments already scheduled     Sep 26, 2018 10:20 AM CDT   SHORT with Brooklynn Leyva MD   Formerly named [facility] (Formerly named [facility])    [address] [place] [id number] [phone]              Who to contact     If you have questions or need follow up information about today's clinic visit or your schedule please contact [facility] directly at [phone].  Normal or non-critical lab and imaging results will be communicated to you by MyChart, letter or phone within 4 business days after the clinic has received the results. If you do not hear from us within 7 days, please contact the clinic through MyChart or phone. If you have a critical or abnormal lab result, we will notify you by phone as soon as possible.  Submit refill requests through WatchParty or call your pharmacy and they will forward the refill request to us. Please allow 3 business days for your refill to be completed.          Additional Information About Your Visit        MyChart Information     WatchParty gives you secure access to your electronic health record. If you see a primary care provider, you can also send messages to your care team and make appointments. If you have questions, please call your primary care clinic.  If you do not have a primary care provider, please call [phone] and they will assist you.        Care EveryWhere ID     This is your Care EveryWhere ID. This could be used by other organizations to access your [place] medical records  [id number]        Your Vitals Were     Last  Period                   06/11/2016            Blood Pressure from Last 3 Encounters:   04/20/18 118/68   01/10/18 109/70   11/07/17 104/66    Weight from Last 3 Encounters:   04/20/18 61.7 kg (136 lb)   01/10/18 59 kg (130 lb)   11/07/17 56.2 kg (124 lb)              Today, you had the following     No orders found for display       Primary Care Provider Office Phone # Fax #    Brooklynn Leyva [phone] [address]  [facility] [address]        Equal Access to Services     [facility]: Hadii aad ku hadasho Soomaali, waaxda luqadaha, qaybta kaalmada carlos, ania kahn . So [facility] [phone].    ATENCIÓN: Si habla español, tiene a orona disposición servicios gratuitos de asistencia lingüística. [facility] [phone].    We comply with applicable federal civil rights laws and [place] laws. We do not discriminate on the basis of race, color, national origin, age, disability, sex, sexual orientation, or gender identity.            Thank you!     Thank you for choosing [facility]  for your care. Our goal is always to provide you with excellent care. Hearing back from our patients is one way we can continue to improve our services. Please take a few minutes to complete the written survey that you may receive in the mail after your visit with us. Thank you!             Your Updated Medication List - Protect others around you: Learn how to safely use, store and throw away your medicines at www.disposemymeds.org.          This list is accurate as of 9/24/18  2:18 PM.  Always use your most recent med list.                   Brand Name Dispense Instructions for use Diagnosis    eszopiclone 1 MG Tabs tablet    LUNESTA    30 tablet    Take 1 tablet (1 mg) by mouth nightly as needed for sleep    Insomnia, unspecified type       ibuprofen 600 MG tablet    ADVIL/MOTRIN    30 tablet    Take 1 tablet (600 mg) by mouth every 6 hours as needed for pain (mild)     Post-op pain       MULTIVITAMIN ADULT PO           traZODone 50 MG tablet    DESYREL    30 tablet    Take 1-2 tablets ( mg) by mouth nightly as needed for sleep    Insomnia, unspecified type       VITAMIN D (CHOLECALCIFEROL) PO      Take by mouth daily

## 2018-10-22 ENCOUNTER — OFFICE VISIT (OUTPATIENT)
Dept: BEHAVIORAL HEALTH | Facility: CLINIC | Age: 53
End: 2018-10-22
Payer: COMMERCIAL

## 2018-10-22 DIAGNOSIS — F33.0 MILD EPISODE OF RECURRENT MAJOR DEPRESSIVE DISORDER (H): Primary | ICD-10-CM

## 2018-10-22 PROCEDURE — 90834 PSYTX W PT 45 MINUTES: CPT | Performed by: SOCIAL WORKER

## 2018-10-22 NOTE — MR AVS SNAPSHOT
After Visit Summary   10/22/2018    Aliyah Askew    MRN: [id number]           Patient Information     Date Of Birth          1965        Visit Information        Provider Department      10/22/2018 10:30 AM Henry Edwards, [facility]        Today's Diagnoses     Mild episode of recurrent major depressive disorder (H)    -  1       Follow-ups after your visit        Who to contact     If you have questions or need follow up information about today's clinic visit or your schedule please contact [facility] directly at [phone].  Normal or non-critical lab and imaging results will be communicated to you by CADsurfhart, letter or phone within 4 business days after the clinic has received the results. If you do not hear from us within 7 days, please contact the clinic through CADsurfhart or phone. If you have a critical or abnormal lab result, we will notify you by phone as soon as possible.  Submit refill requests through AccelGolf or call your pharmacy and they will forward the refill request to us. Please allow 3 business days for your refill to be completed.          Additional Information About Your Visit        MyChart Information     [employer] gives you secure access to your electronic health record. If you see a primary care provider, you can also send messages to your care team and make appointments. If you have questions, please call your primary care clinic.  If you do not have a primary care provider, please call [phone] and they will assist you.        Care EveryWhere ID     This is your Care EveryWhere ID. This could be used by other organizations to access your [place] medical records  [id number]        Your Vitals Were     Last Period                   06/11/2016            Blood Pressure from Last 3 Encounters:   04/20/18 118/68   01/10/18 109/70   11/07/17 104/66    Weight from Last 3 Encounters:   04/20/18 61.7 kg (136 lb)   01/10/18 59 kg (130  lb)   11/07/17 56.2 kg (124 lb)              Today, you had the following     No orders found for display       Primary Care Provider Office Phone # Fax #    Brooklynn Leyva [phone] [address]  [facility] [place] [address]        Equal Access to Services     [facility] JUSTICE : Hadii aad ku hadasho Soomaali, waaxda luqadaha, qaybta kaalmada adeegyada, waxay patricioin hayalinen adecornel douglassvernroxy kahn . So [facility] [place] [facility] [phone].    ATENCIÓN: Si habla español, tiene a orona disposición servicios gratuitos de asistencia lingüística. [facility] [phone].    We comply with applicable federal civil rights laws and [place] laws. We do not discriminate on the basis of race, color, national origin, age, disability, sex, sexual orientation, or gender identity.            Thank you!     Thank you for choosing [facility]  for your care. Our goal is always to provide you with excellent care. Hearing back from our patients is one way we can continue to improve our services. Please take a few minutes to complete the written survey that you may receive in the mail after your visit with us. Thank you!             Your Updated Medication List - Protect others around you: Learn how to safely use, store and throw away your medicines at www.disposemymeds.org.          This list is accurate as of 10/22/18 11:59 PM.  Always use your most recent med list.                   Brand Name Dispense Instructions for use Diagnosis    eszopiclone 1 MG Tabs tablet    LUNESTA    30 tablet    Take 1 tablet (1 mg) by mouth nightly as needed for sleep    Insomnia, unspecified type       ibuprofen 600 MG tablet    ADVIL/MOTRIN    30 tablet    Take 1 tablet (600 mg) by mouth every 6 hours as needed for pain (mild)    Post-op pain       MULTIVITAMIN ADULT PO           traZODone 50 MG tablet    DESYREL    30 tablet    Take 1-2 tablets ( mg) by mouth nightly as needed for sleep    Insomnia, unspecified type       VITAMIN D  (CHOLECALCIFEROL) PO      Take by mouth daily
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
## 2018-11-02 ENCOUNTER — OFFICE VISIT (OUTPATIENT)
Dept: BEHAVIORAL HEALTH | Facility: CLINIC | Age: 53
End: 2018-11-02
Payer: COMMERCIAL

## 2018-11-02 DIAGNOSIS — F32.3 CURRENT SEVERE EPISODE OF MAJOR DEPRESSIVE DISORDER WITH PSYCHOTIC FEATURES WITHOUT PRIOR EPISODE (H): Primary | ICD-10-CM

## 2018-11-02 PROCEDURE — 90837 PSYTX W PT 60 MINUTES: CPT | Performed by: SOCIAL WORKER

## 2018-11-02 NOTE — MR AVS SNAPSHOT
After Visit Summary   11/2/2018    Aliyah Askew    MRN: [id number]           Patient Information     Date Of Birth          1965        Visit Information        Provider Department      11/2/2018 3:00 PM Henry Edwards, [facility]        Today's Diagnoses     Current severe episode of major depressive disorder with psychotic features without prior episode (H)    -  1       Follow-ups after your visit        Who to contact     If you have questions or need follow up information about today's clinic visit or your schedule please contact [facility] [place][note 2] directly at [phone].  Normal or non-critical lab and imaging results will be communicated to you by Amlogichart, letter or phone within 4 business days after the clinic has received the results. If you do not hear from us within 7 days, please contact the clinic through Amlogichart or phone. If you have a critical or abnormal lab result, we will notify you by phone as soon as possible.  Submit refill requests through App47 or call your pharmacy and they will forward the refill request to us. Please allow 3 business days for your refill to be completed.          Additional Information About Your Visit        MyChart Information     App47 gives you secure access to your electronic health record. If you see a primary care provider, you can also send messages to your care team and make appointments. If you have questions, please call your primary care clinic.  If you do not have a primary care provider, please call [phone] and they will assist you.        Care EveryWhere ID     This is your Care EveryWhere ID. This could be used by other organizations to access your [place] medical records  [id number]        Your Vitals Were     Last Period                   06/11/2016            Blood Pressure from Last 3 Encounters:   04/20/18 118/68   01/10/18 109/70   11/07/17 104/66    Weight from Last 3 Encounters:    04/20/18 61.7 kg (136 lb)   01/10/18 59 kg (130 lb)   11/07/17 56.2 kg (124 lb)              Today, you had the following     No orders found for display       Primary Care Provider Office Phone # Fax #    Brooklynn Leyva [phone] [address]  [facility] [place] [address]        Equal Access to Services     [facility] : Hadii aad ku hadasho Soomaali, waaxda luqadaha, qaybta kaalmada adeegyada, waxay idiin hayaan adeeg kharash la'aan ah. So [facility] [phone].    ATENCIÓN: Si edgard dalal, tiene a orona disposición servicios gratuitos de asistencia lingüística. Llame al [phone].    We comply with applicable federal civil rights laws and [place] laws. We do not discriminate on the basis of race, color, national origin, age, disability, sex, sexual orientation, or gender identity.            Thank you!     Thank you for choosing [facility]  for your care. Our goal is always to provide you with excellent care. Hearing back from our patients is one way we can continue to improve our services. Please take a few minutes to complete the written survey that you may receive in the mail after your visit with us. Thank you!             Your Updated Medication List - Protect others around you: Learn how to safely use, store and throw away your medicines at www.disposemymeds.org.          This list is accurate as of 11/2/18 11:59 PM.  Always use your most recent med list.                   Brand Name Dispense Instructions for use Diagnosis    eszopiclone 1 MG Tabs tablet    LUNESTA    30 tablet    Take 1 tablet (1 mg) by mouth nightly as needed for sleep    Insomnia, unspecified type       ibuprofen 600 MG tablet    ADVIL/MOTRIN    30 tablet    Take 1 tablet (600 mg) by mouth every 6 hours as needed for pain (mild)    Post-op pain       MULTIVITAMIN ADULT PO           traZODone 50 MG tablet    DESYREL    30 tablet    Take 1-2 tablets ( mg) by mouth nightly as needed for sleep     Insomnia, unspecified type       VITAMIN D (CHOLECALCIFEROL) PO      Take by mouth daily
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
## 2018-11-15 ENCOUNTER — OFFICE VISIT (OUTPATIENT)
Dept: BEHAVIORAL HEALTH | Facility: CLINIC | Age: 53
End: 2018-11-15
Payer: COMMERCIAL

## 2018-11-15 DIAGNOSIS — F32.9 MAJOR DEPRESSION: Primary | ICD-10-CM

## 2018-11-15 PROCEDURE — 90837 PSYTX W PT 60 MINUTES: CPT | Performed by: SOCIAL WORKER
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
## 2018-11-28 ENCOUNTER — OFFICE VISIT (OUTPATIENT)
Dept: BEHAVIORAL HEALTH | Facility: CLINIC | Age: 53
End: 2018-11-28
Payer: COMMERCIAL

## 2018-11-28 DIAGNOSIS — F41.1 GAD (GENERALIZED ANXIETY DISORDER): Primary | ICD-10-CM

## 2018-11-28 PROCEDURE — 90837 PSYTX W PT 60 MINUTES: CPT | Performed by: SOCIAL WORKER

## 2018-11-28 NOTE — MR AVS SNAPSHOT
After Visit Summary   11/28/2018    Aliyah Askew    MRN: [id number]           Patient Information     Date Of Birth          1965        Visit Information        Provider Department      11/28/2018 4:00 PM Henry Edwards, [facility]        Today's Diagnoses     WALLACE (generalized anxiety disorder)    -  1       Follow-ups after your visit        Who to contact     If you have questions or need follow up information about today's clinic visit or your schedule please contact [facility] directly at [phone].  Normal or non-critical lab and imaging results will be communicated to you by [email or web address], letter or phone within 4 business days after the clinic has received the results. If you do not hear from us within 7 days, please contact the clinic through [employer]t or phone. If you have a critical or abnormal lab result, we will notify you by phone as soon as possible.  Submit refill requests through tadoÂ° or call your pharmacy and they will forward the refill request to us. Please allow 3 business days for your refill to be completed.          Additional Information About Your Visit        MyChart Information     tadoÂ° gives you secure access to your electronic health record. If you see a primary care provider, you can also send messages to your care team and make appointments. If you have questions, please call your primary care clinic.  If you do not have a primary care provider, please call [phone] and they will assist you.        Care EveryWhere ID     This is your Care EveryWhere ID. This could be used by other organizations to access your [place] medical records  [id number]        Your Vitals Were     Last Period                   06/11/2016            Blood Pressure from Last 3 Encounters:   04/20/18 118/68   01/10/18 109/70   11/07/17 104/66    Weight from Last 3 Encounters:   04/20/18 61.7 kg (136 lb)   01/10/18 59 kg (130 lb)   11/07/17 56.2  kg (124 lb)              Today, you had the following     No orders found for display       Primary Care Provider Office Phone # Fax #    Brooklynn Leyva [phone] [address]  [facility] [address]        Equal Access to Services     BRADLEYRHIANNON JUSTICE : Hadii mat ku hadmagoo Soamosali, waaxda luqadaha, qaybta kaalmada adeegyada, ania patricioin nusratn sulemancornel garciaroxy kahn . So [facility] [phone].    ATENCIÓN: Si habla español, tiene a orona disposición servicios gratuitos de asistencia lingüística. Llame al [phone].    We comply with applicable federal civil rights laws and [place] laws. We do not discriminate on the basis of race, color, national origin, age, disability, sex, sexual orientation, or gender identity.            Thank you!     Thank you for choosing [facility]  for your care. Our goal is always to provide you with excellent care. Hearing back from our patients is one way we can continue to improve our services. Please take a few minutes to complete the written survey that you may receive in the mail after your visit with us. Thank you!             Your Updated Medication List - Protect others around you: Learn how to safely use, store and throw away your medicines at www.disposemymeds.org.          This list is accurate as of 11/28/18 11:59 PM.  Always use your most recent med list.                   Brand Name Dispense Instructions for use Diagnosis    eszopiclone 1 MG tablet    LUNESTA    30 tablet    Take 1 tablet (1 mg) by mouth nightly as needed for sleep    Insomnia, unspecified type       ibuprofen 600 MG tablet    ADVIL/MOTRIN    30 tablet    Take 1 tablet (600 mg) by mouth every 6 hours as needed for pain (mild)    Post-op pain       MULTIVITAMIN ADULT PO           traZODone 50 MG tablet    DESYREL    30 tablet    Take 1-2 tablets ( mg) by mouth nightly as needed for sleep    Insomnia, unspecified type       VITAMIN D (CHOLECALCIFEROL) PO      Take  by mouth daily
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
## 2018-12-14 ENCOUNTER — OFFICE VISIT (OUTPATIENT)
Dept: BEHAVIORAL HEALTH | Facility: CLINIC | Age: 53
End: 2018-12-14
Payer: COMMERCIAL

## 2018-12-14 DIAGNOSIS — F41.1 GAD (GENERALIZED ANXIETY DISORDER): Primary | ICD-10-CM

## 2018-12-14 PROCEDURE — 90837 PSYTX W PT 60 MINUTES: CPT | Performed by: SOCIAL WORKER
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
## 2018-12-21 ENCOUNTER — TELEPHONE (OUTPATIENT)
Dept: FAMILY MEDICINE | Facility: CLINIC | Age: 53
End: 2018-12-21

## 2018-12-21 NOTE — TELEPHONE ENCOUNTER
Left message with patient supporting and validating her message that her neighbor had a party 2 days ago.  Counseled patient that would be off next week for the holidays but back on January 2 and appears to have openings at that time.  Advised patient that I would review mycharts and would accommodate an appointment for her that week.

## 2019-01-10 ENCOUNTER — OFFICE VISIT (OUTPATIENT)
Dept: BEHAVIORAL HEALTH | Facility: CLINIC | Age: 54
End: 2019-01-10
Payer: COMMERCIAL

## 2019-01-10 DIAGNOSIS — F41.1 GAD (GENERALIZED ANXIETY DISORDER): Primary | ICD-10-CM

## 2019-01-10 PROCEDURE — 90837 PSYTX W PT 60 MINUTES: CPT | Performed by: SOCIAL WORKER
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
## 2019-01-16 ENCOUNTER — THERAPY VISIT (OUTPATIENT)
Dept: PHYSICAL THERAPY | Facility: CLINIC | Age: 54
End: 2019-01-16
Payer: COMMERCIAL

## 2019-01-16 DIAGNOSIS — M25.561 RIGHT KNEE PAIN: Primary | ICD-10-CM

## 2019-01-16 PROCEDURE — 97110 THERAPEUTIC EXERCISES: CPT | Mod: GP | Performed by: PHYSICAL THERAPIST

## 2019-01-16 PROCEDURE — 97161 PT EVAL LOW COMPLEX 20 MIN: CPT | Mod: GP | Performed by: PHYSICAL THERAPIST

## 2019-01-16 ASSESSMENT — ACTIVITIES OF DAILY LIVING (ADL)
KNEE_ACTIVITY_OF_DAILY_LIVING_SCORE: 61.43
GIVING WAY, BUCKLING OR SHIFTING OF KNEE: I HAVE THE SYMPTOM BUT IT DOES NOT AFFECT MY ACTIVITY
SIT WITH YOUR KNEE BENT: ACTIVITY IS FAIRLY DIFFICULT
RAW_SCORE: 43
WALK: ACTIVITY IS MINIMALLY DIFFICULT
HOW_WOULD_YOU_RATE_THE_OVERALL_FUNCTION_OF_YOUR_KNEE_DURING_YOUR_USUAL_DAILY_ACTIVITIES?: ABNORMAL
KNEE_ACTIVITY_OF_DAILY_LIVING_SUM: 43
GO UP STAIRS: ACTIVITY IS MINIMALLY DIFFICULT
SQUAT: ACTIVITY IS SOMEWHAT DIFFICULT
SWELLING: THE SYMPTOM AFFECTS MY ACTIVITY MODERATELY
AS_A_RESULT_OF_YOUR_KNEE_INJURY,_HOW_WOULD_YOU_RATE_YOUR_CURRENT_LEVEL_OF_DAILY_ACTIVITY?: ABNORMAL
STIFFNESS: THE SYMPTOM AFFECTS MY ACTIVITY MODERATELY
STAND: ACTIVITY IS SOMEWHAT DIFFICULT
PAIN: THE SYMPTOM AFFECTS MY ACTIVITY SLIGHTLY
RISE FROM A CHAIR: ACTIVITY IS SOMEWHAT DIFFICULT
KNEEL ON THE FRONT OF YOUR KNEE: ACTIVITY IS SOMEWHAT DIFFICULT
WEAKNESS: THE SYMPTOM AFFECTS MY ACTIVITY SLIGHTLY
HOW_WOULD_YOU_RATE_THE_CURRENT_FUNCTION_OF_YOUR_KNEE_DURING_YOUR_USUAL_DAILY_ACTIVITIES_ON_A_SCALE_FROM_0_TO_100_WITH_100_BEING_YOUR_LEVEL_OF_KNEE_FUNCTION_PRIOR_TO_YOUR_INJURY_AND_0_BEING_THE_INABILITY_TO_PERFORM_ANY_OF_YOUR_USUAL_DAILY_ACTIVITIES?: 60
GO DOWN STAIRS: ACTIVITY IS MINIMALLY DIFFICULT
LIMPING: THE SYMPTOM AFFECTS MY ACTIVITY SLIGHTLY

## 2019-01-16 NOTE — PROGRESS NOTES
[facility] Initial Evaluation  Subjective:  The history is provided by the patient. No  was used.   Aliyah Askew is a 53 year old female with a right knee condition.  Condition occurred with:  A fall/slip.  Condition occurred: during recreation/sport.  This is a new condition  12/2018 running with dog and tripped on sidewalk. Noted increase in knee swelling and feeling like kneecap was not in the right place.  Tried to ice and rest, but then did a short run and exacerbated knee.  Went to [facility], knee xray was normal.  Continuing to have issues with sitting with knee bent, running, or walking/standing for work (concrete), and stiffness in am.  Currently limiting running to 1-2x/week.  Goal is 3-4x/week approx 3-6 mile run; bike commutes 10 mths/year..    Patient reports pain:  Sub patellar.  Radiates to:  Knee.  Pain is described as aching and is intermittent and reported as 4/10.  Associated symptoms:  Loss of motion/stiffness. Pain is worse in the A.M..  Symptoms are exacerbated by running, ascending stairs, sitting, kneeling and bending/squatting and relieved by ice and rest.  Since onset symptoms are gradually improving.  Special tests:  X-ray.      General health as reported by patient is good.                                              Objective:  System                                                Knee Evaluation:  ROM:  PROM: normal (pain with flexion OP)  Strength wnl knee: Quad 4/5 pain limited; Glut med 4/5.              Special Tests:     Right knee positive for the following tests:  Patellar Compression  Palpation:      Right knee tenderness present at:  Patellar Lateral  Edema:  Edema of the knee: mild gross edema noted in right knee.      Functional Testing:          Quad:    Single Leg Squat:  Left:      Right:       Mild loss of control, femoral IR and excessive anterior knee excursion  Bilateral Leg Squat:                General     ROS     Lateral step  down rep ant knee pain, abolished post Skinner taping    Assessment/Plan:    Patient is a 53 year old female with right side knee complaints.    Patient has the following significant findings with corresponding treatment plan.                Diagnosis 1:  Right knee pain  Pain -  manual therapy, splint/taping/bracing/orthotics, self management, education and home program  Decreased strength - therapeutic exercise and therapeutic activities  Impaired muscle performance - neuro re-education  Decreased function - therapeutic activities    Therapy Evaluation Codes:   1) History comprised of:   Personal factors that impact the plan of care:      None.    Comorbidity factors that impact the plan of care are:      None.     Medications impacting care: None.  2) Examination of Body Systems comprised of:   Body structures and functions that impact the plan of care:      Knee.   Activity limitations that impact the plan of care are:      Stairs.  3) Clinical presentation characteristics are:   Stable/Uncomplicated.  4) Decision-Making    Low complexity using standardized patient assessment instrument and/or measureable assessment of functional outcome.  Cumulative Therapy Evaluation is: Low complexity.    Previous and current functional limitations:  (See Goal Flow Sheet for this information)    Short term and Long term goals: (See Goal Flow Sheet for this information)     Communication ability:  Patient appears to be able to clearly communicate and understand verbal and written communication and follow directions correctly.  Treatment Explanation - The following has been discussed with the patient:   RX ordered/plan of care  Anticipated outcomes  Possible risks and side effects  This patient would benefit from PT intervention to resume normal activities.   Rehab potential is good.    Frequency:  1 X week, once daily  Duration:  for 4 weeks  Discharge Plan:  Achieve all LTG.  Independent in home treatment program.  Reach  maximal therapeutic benefit.    Please refer to the daily flowsheet for treatment today, total treatment time and time spent performing 1:1 timed codes.

## 2019-02-07 ENCOUNTER — OFFICE VISIT (OUTPATIENT)
Dept: BEHAVIORAL HEALTH | Facility: CLINIC | Age: 54
End: 2019-02-07
Payer: COMMERCIAL

## 2019-02-07 DIAGNOSIS — F41.1 GAD (GENERALIZED ANXIETY DISORDER): Primary | ICD-10-CM

## 2019-02-07 PROCEDURE — 90834 PSYTX W PT 45 MINUTES: CPT | Performed by: SOCIAL WORKER
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
## 2019-02-13 ENCOUNTER — OFFICE VISIT (OUTPATIENT)
Dept: BEHAVIORAL HEALTH | Facility: CLINIC | Age: 54
End: 2019-02-13
Payer: COMMERCIAL

## 2019-02-13 DIAGNOSIS — F33.1 MODERATE EPISODE OF RECURRENT MAJOR DEPRESSIVE DISORDER (H): Primary | ICD-10-CM

## 2019-02-13 PROCEDURE — 90834 PSYTX W PT 45 MINUTES: CPT | Performed by: SOCIAL WORKER
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
## 2019-02-20 ENCOUNTER — OFFICE VISIT (OUTPATIENT)
Dept: BEHAVIORAL HEALTH | Facility: CLINIC | Age: 54
End: 2019-02-20
Payer: COMMERCIAL

## 2019-02-20 DIAGNOSIS — F41.1 GAD (GENERALIZED ANXIETY DISORDER): Primary | ICD-10-CM

## 2019-02-20 PROCEDURE — 90837 PSYTX W PT 60 MINUTES: CPT | Performed by: SOCIAL WORKER
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
## 2019-03-20 ENCOUNTER — OFFICE VISIT (OUTPATIENT)
Dept: BEHAVIORAL HEALTH | Facility: CLINIC | Age: 54
End: 2019-03-20
Payer: COMMERCIAL

## 2019-03-20 DIAGNOSIS — F32.9 MAJOR DEPRESSION: Primary | ICD-10-CM

## 2019-03-20 PROCEDURE — 90834 PSYTX W PT 45 MINUTES: CPT | Performed by: SOCIAL WORKER
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
## 2019-03-28 ENCOUNTER — OFFICE VISIT (OUTPATIENT)
Dept: FAMILY MEDICINE | Facility: CLINIC | Age: 54
End: 2019-03-28
Payer: COMMERCIAL

## 2019-03-28 VITALS
DIASTOLIC BLOOD PRESSURE: 75 MMHG | SYSTOLIC BLOOD PRESSURE: 123 MMHG | OXYGEN SATURATION: 95 % | HEART RATE: 61 BPM | TEMPERATURE: 98.3 F

## 2019-03-28 DIAGNOSIS — Z12.31 VISIT FOR SCREENING MAMMOGRAM: ICD-10-CM

## 2019-03-28 DIAGNOSIS — N39.41 URGE INCONTINENCE: ICD-10-CM

## 2019-03-28 DIAGNOSIS — R39.15 URINARY URGENCY: Primary | ICD-10-CM

## 2019-03-28 DIAGNOSIS — Z12.4 SCREENING FOR CERVICAL CANCER: ICD-10-CM

## 2019-03-28 DIAGNOSIS — Z78.0 MENOPAUSE: ICD-10-CM

## 2019-03-28 LAB
ALBUMIN UR-MCNC: NEGATIVE MG/DL
APPEARANCE UR: CLEAR
BACTERIA #/AREA URNS HPF: ABNORMAL /HPF
BILIRUB UR QL STRIP: NEGATIVE
COLOR UR AUTO: YELLOW
GLUCOSE UR STRIP-MCNC: NEGATIVE MG/DL
HGB UR QL STRIP: NEGATIVE
KETONES UR STRIP-MCNC: NEGATIVE MG/DL
LEUKOCYTE ESTERASE UR QL STRIP: ABNORMAL
NITRATE UR QL: NEGATIVE
NON-SQ EPI CELLS #/AREA URNS LPF: ABNORMAL /LPF
PH UR STRIP: 5.5 PH (ref 5–7)
RBC #/AREA URNS AUTO: ABNORMAL /HPF
SOURCE: ABNORMAL
SP GR UR STRIP: 1.01 (ref 1–1.03)
UROBILINOGEN UR STRIP-ACNC: 0.2 EU/DL (ref 0.2–1)
WBC #/AREA URNS AUTO: ABNORMAL /HPF

## 2019-03-28 PROCEDURE — 87624 HPV HI-RISK TYP POOLED RSLT: CPT | Performed by: FAMILY MEDICINE

## 2019-03-28 PROCEDURE — 81001 URINALYSIS AUTO W/SCOPE: CPT | Performed by: FAMILY MEDICINE

## 2019-03-28 PROCEDURE — 99214 OFFICE O/P EST MOD 30 MIN: CPT | Performed by: FAMILY MEDICINE

## 2019-03-28 PROCEDURE — G0145 SCR C/V CYTO,THINLAYER,RESCR: HCPCS | Performed by: FAMILY MEDICINE

## 2019-03-28 ASSESSMENT — ENCOUNTER SYMPTOMS
HEMATOCHEZIA: 0
CONSTIPATION: 0
PALPITATIONS: 0
HEADACHES: 0
FREQUENCY: 1
NAUSEA: 0
SHORTNESS OF BREATH: 0
ABDOMINAL PAIN: 0
DIARRHEA: 0
COUGH: 0
NERVOUS/ANXIOUS: 1
ARTHRALGIAS: 1
WEAKNESS: 0
JOINT SWELLING: 1
SORE THROAT: 0
FEVER: 0
EYE PAIN: 0
DIZZINESS: 0
PARESTHESIAS: 0
BREAST MASS: 0
MYALGIAS: 1
DYSURIA: 0
HEMATURIA: 0
HEARTBURN: 0
CHILLS: 0

## 2019-03-28 NOTE — PATIENT INSTRUCTIONS
1) Schedule with Urology.   2) Schedule with physical therapy.   3) Come back if you decide you want to consider medication for your menopausal symptoms.     Preventive Health Recommendations  Female Ages 50 - 64    Yearly exam: See your health care provider every year in order to  o Review health changes.   o Discuss preventive care.    o Review your medicines if your doctor has prescribed any.      Get a Pap test every three years (unless you have an abnormal result and your provider advises testing more often).    If you get Pap tests with HPV test, you only need to test every 5 years, unless you have an abnormal result.     You do not need a Pap test if your uterus was removed (hysterectomy) and you have not had cancer.    You should be tested each year for STDs (sexually transmitted diseases) if you're at risk.     Have a mammogram every 1 to 2 years.    Have a colonoscopy at age 50, or have a yearly FIT test (stool test). These exams screen for colon cancer.      Have a cholesterol test every 5 years, or more often if advised.    Have a diabetes test (fasting glucose) every three years. If you are at risk for diabetes, you should have this test more often.     If you are at risk for osteoporosis (brittle bone disease), think about having a bone density scan (DEXA).    Shots: Get a flu shot each year. Get a tetanus shot every 10 years.    Nutrition:     Eat at least 5 servings of fruits and vegetables each day.    Eat whole-grain bread, whole-wheat pasta and brown rice instead of white grains and rice.    Get adequate Calcium and Vitamin D.     Lifestyle    Exercise at least 150 minutes a week (30 minutes a day, 5 days a week). This will help you control your weight and prevent disease.    Limit alcohol to one drink per day.    No smoking.     Wear sunscreen to prevent skin cancer.     See your dentist every six months for an exam and cleaning.    See your eye doctor every 1 to 2 years.

## 2019-03-28 NOTE — PROGRESS NOTES
"  SUBJECTIVE:   Aliyah Askew is a 53 year old female who presents to clinic today for the following health issues:      URINARY TRACT SYMPTOMS  Onset: fall of 2018     Description:   Painful urination (Dysuria): no   Blood in urine (Hematuria): no   Delay in urine (Hesitency): no     Intensity: mild    Progression of Symptoms:  same    Accompanying Signs & Symptoms:  Fever/chills: no   Flank pain no   Nausea and vomiting: no   Any vaginal symptoms: none  Abdominal/Pelvic Pain: no     History:   History of frequent UTI's: no   History of kidney stones: no   Sexually Active: no   Possibility of pregnancy: No    Precipitating factors:   Nothing     Therapies Tried and outcome: advil as needed     Her urinary urgency never really completely went away. It has been pretty intense the past couple of weeks. She cannot drink as much water as she wants because whenever she has to urinate it is very urgent and she sometimes ends up leaking. When her fibroid was diagnosed, she was told it was pushing into her bladder. She had been seeing a PT for pelvic floor therapy. She did not see urology in follow up.     She is taking pure lemon juice with turmeric paste for anti-inflammatory purposes.     She notices food and alcohol with hot flashes and night sweats. Alcohol triggers hot flashes. Sugar has for her too. She is slowly moving toward an anti-inflammatory diet. Doesn't sleep well. Does not think she wants to use any hormonal therapy.     She continues to be bothered by the constant humming sound in her house.  She is considering moving.     From 3/20/2019 visit with Gustabo Edwards:  \"   \"Patient expressed concern with increased menopausal symptoms.  Patient reports she is having difficulty sleeping as needs to wake up several times due to bladder problems.  Patient reports these bladder issues are also impacting her ability to be active in the community.  Patient reports increased fatigue and pain in her joints that is " "preventing her from running.  Patient is running is her primary way of coping.  Patient is a follow-up appointment with her primary care physician.\"    Problem list and histories reviewed & adjusted, as indicated.  Additional history: as documented    Patient Active Problem List   Diagnosis     CARDIOVASCULAR SCREENING; LDL GOAL LESS THAN 160     Major depression     Vitamin D deficiency     Leukopenia     S/P laparoscopic supracervical hysterectomy     Urinary urgency     Urge incontinence     Right knee pain     WALLACE (generalized anxiety disorder)     Past Surgical History:   Procedure Laterality Date     BIOPSY  16     HYSTERECTOMY, SUPRACERVICAL LAPAROSCOPIC  6/15/16     LAPAROSCOPIC HYSTERECTOMY TOTAL, SALPINGECTOMY BILATERAL N/A 6/15/2016    Procedure: LAPAROSCOPIC HYSTERECTOMY TOTAL, SALPINGECTOMY BILATERAL;  Surgeon: Kavita Manley MD;  Location: [facility] OR     LAPAROSCOPIC SALPINGECTOMY Bilateral 6/15/16       Social History     Tobacco Use     Smoking status: Never Smoker     Smokeless tobacco: Never Used   Substance Use Topics     Alcohol use: Yes     Comment: 5 drinks per week     Family History   Problem Relation Age of Onset     Heart Disease Father         early 40's had first episode, ?heart related, age 42     Anxiety Disorder Father      Obesity Father      Hypertension Mother      Asthma Mother      Osteoporosis Mother      Arthritis Mother      Heart Disease Mother      Obesity Sister         s/p gastric bypass     Cancer Maternal Aunt         brain tumor     Eye Disorder Brother      Eye Disorder Sister      Diabetes Brother      Diabetes Brother      Obesity Brother      Hypertension Sister      Anxiety Disorder Sister      Thyroid Disease Sister      Obesity Sister      Anxiety Disorder Brother          Current Outpatient Medications   Medication Sig Dispense Refill     eszopiclone (LUNESTA) 1 MG TABS tablet Take 1 tablet (1 mg) by mouth nightly as needed for sleep 30 tablet 1     " Multiple Vitamins-Minerals (MULTIVITAMIN ADULT PO)        traZODone (DESYREL) 50 MG tablet Take 1-2 tablets ( mg) by mouth nightly as needed for sleep 30 tablet 1     VITAMIN D, CHOLECALCIFEROL, PO Take by mouth daily       ibuprofen (ADVIL,MOTRIN) 600 MG tablet Take 1 tablet (600 mg) by mouth every 6 hours as needed for pain (mild) (Patient not taking: Reported on 4/20/2018) 30 tablet 0     Allergies   Allergen Reactions     Adhesive Tape      Aminoglycosides      Ofloxacin      eye drops     Recent Labs   Lab Test 12/17/14  1226 10/26/12  1116   LDL  --  108   HDL  --  60   TRIG  --  79   CR 0.81  --    GFRESTIMATED 76  --    GFRESTBLACK >90   GFR Calc    --    POTASSIUM 4.8  --    TSH 1.36  --       BP Readings from Last 3 Encounters:   03/28/19 123/75   04/20/18 118/68   01/10/18 109/70    Wt Readings from Last 3 Encounters:   04/20/18 61.7 kg (136 lb)   01/10/18 59 kg (130 lb)   11/07/17 56.2 kg (124 lb)              Reviewed and updated as needed this visit by clinical staff  Tobacco  Allergies  Meds       Reviewed and updated as needed this visit by Provider         ROS:  As above     OBJECTIVE:     /75 (BP Location: Left arm, Patient Position: Sitting, Cuff Size: Adult Regular)   Pulse 61   Temp 98.3  F (36.8  C) (Oral)   LMP 06/11/2016   SpO2 95%   There is no height or weight on file to calculate BMI.  GENERAL: healthy, alert and no distress   (female): normal female external genitalia, normal urethral meatus, vaginal mucosa, normal cervix     Diagnostic Test Results:  none     ASSESSMENT/PLAN:          1. Urinary urgency  2. Urge incontinence  She did not complete previous evaluation with urology in 2016, so I gave her another referral to see urology and she may also schedule with PT as she found that very helpful in the past. UA today without evidence of infection.   - UROLOGY ADULT REFERRAL  - [facility] REFERRAL; Future    3. Menopause  She is  currently hoping to manage symptoms on her own and has been converting to an anti-inflammatory diet.  She is not interested in hormonal therapy.  She may be interested in nonhormonal therapies, but will let me know she thinks about this.    4. Visit for screening mammogram     - MA Screening Digital Bilateral; Future    5. Screening for cervical cancer     - Pap imaged thin layer screen with HPV - recommended age 30 - 65 years (select HPV order below)  - HPV High Risk Types DNA Cervical    Patient Instructions   1) Schedule with Urology.   2) Schedule with physical therapy.   3) Come back if you decide you want to consider medication for your menopausal symptoms.     Preventive Health Recommendations  Female Ages 50 - 64    Yearly exam: See your health care provider every year in order to  o Review health changes.   o Discuss preventive care.    o Review your medicines if your doctor has prescribed any.      Get a Pap test every three years (unless you have an abnormal result and your provider advises testing more often).    If you get Pap tests with HPV test, you only need to test every 5 years, unless you have an abnormal result.     You do not need a Pap test if your uterus was removed (hysterectomy) and you have not had cancer.    You should be tested each year for STDs (sexually transmitted diseases) if you're at risk.     Have a mammogram every 1 to 2 years.    Have a colonoscopy at age 50, or have a yearly FIT test (stool test). These exams screen for colon cancer.      Have a cholesterol test every 5 years, or more often if advised.    Have a diabetes test (fasting glucose) every three years. If you are at risk for diabetes, you should have this test more often.     If you are at risk for osteoporosis (brittle bone disease), think about having a bone density scan (DEXA).    Shots: Get a flu shot each year. Get a tetanus shot every 10 years.    Nutrition:     Eat at least 5 servings of fruits and vegetables  each day.    Eat whole-grain bread, whole-wheat pasta and brown rice instead of white grains and rice.    Get adequate Calcium and Vitamin D.     Lifestyle    Exercise at least 150 minutes a week (30 minutes a day, 5 days a week). This will help you control your weight and prevent disease.    Limit alcohol to one drink per day.    No smoking.     Wear sunscreen to prevent skin cancer.     See your dentist every six months for an exam and cleaning.    See your eye doctor every 1 to 2 years.        Brooklynn Leyva M.D.        [facility]

## 2019-03-28 NOTE — PROGRESS NOTES
"   SUBJECTIVE:   CC: Aliyah Askew is an 53 year old woman who presents for preventive health visit.     Physical   Annual:     Getting at least 3 servings of Calcium per day:  Yes    Bi-annual eye exam:  NO    Dental care twice a year:  Yes    Sleep apnea or symptoms of sleep apnea:  Daytime drowsiness    Diet:  Regular (no restrictions)    Frequency of exercise:  4-5 days/week    Duration of exercise:  30-45 minutes    Taking medications regularly:  No    Barriers to taking medications:  Other    Medication side effects:  Other    Additional concerns today:  Yes    PHQ-2 Total Score: 1    Today's PHQ-2 Score:   PHQ-2 ( 1999 Pfizer) 3/28/2019   Q1: Little interest or pleasure in doing things 0   Q2: Feeling down, depressed or hopeless 1   PHQ-2 Score 1   Q1: Little interest or pleasure in doing things Not at all   Q2: Feeling down, depressed or hopeless Several days   PHQ-2 Score 1       Abuse: Current or Past(Physical, Sexual or Emotional)- No  Do you feel safe in your environment? Yes    Social History     Tobacco Use     Smoking status: Never Smoker     Smokeless tobacco: Never Used   Substance Use Topics     Alcohol use: Yes     Comment: 5 drinks per week     Alcohol Use 3/28/2019   If you drink alcohol do you typically have greater than 3 drinks per day OR greater than 7 drinks per week? No   No flowsheet data found.    Reviewed orders with patient.  Reviewed health maintenance and updated orders accordingly - {Yes/No:944190::\"Yes\"}  {Chronicprobdata (Optional):607407}    {Mammo Decision Support (Optional):278758}    Pertinent mammograms are reviewed under the imaging tab.  History of abnormal Pap smear: {PAP HX:841254}  PAP / HPV 3/16/2016 7/1/2013 5/6/2010   PAP NIL NIL OTHER-NIL EM>40     Reviewed and updated as needed this visit by clinical staff         Reviewed and updated as needed this visit by Provider        {HISTORY OPTIONS (Optional):250416}    Review of Systems   Constitutional: Negative for " "chills and fever.   HENT: Negative for congestion, ear pain, hearing loss and sore throat.    Eyes: Positive for visual disturbance. Negative for pain.   Respiratory: Negative for cough and shortness of breath.    Cardiovascular: Negative for chest pain, palpitations and peripheral edema.   Gastrointestinal: Negative for abdominal pain, constipation, diarrhea, heartburn, hematochezia and nausea.   Breasts:  Negative for tenderness, breast mass and discharge.   Genitourinary: Positive for frequency and urgency. Negative for dysuria, genital sores, hematuria, pelvic pain, vaginal bleeding and vaginal discharge.   Musculoskeletal: Positive for arthralgias, joint swelling and myalgias.   Skin: Negative for rash.   Neurological: Negative for dizziness, weakness, headaches and paresthesias.   Psychiatric/Behavioral: Positive for mood changes. The patient is nervous/anxious.      {FEMALE ROS (Optional):537816}     OBJECTIVE:   LMP 06/11/2016   Physical Exam  {Exam Choices (Optional):716836}    {Diagnostic Test Results (Optional):352633::\"Diagnostic Test Results:\",\"none \"}    ASSESSMENT/PLAN:   {Diag Picklist:341743}    COUNSELING:  {FEMALE COUNSELING MESSAGES:249563::\"Reviewed preventive health counseling, as reflected in patient instructions\"}    BP Readings from Last 1 Encounters:   04/20/18 118/68     Estimated body mass index is 24.28 kg/m  as calculated from the following:    Height as of 1/10/18: 1.594 m (5' 2.75\").    Weight as of 4/20/18: 61.7 kg (136 lb).    {BP Counseling- Complete if BP >= 120/80  (Optional):786432}  {Weight Management Plan (ACO) Complete if BMI is abnormal-  Ages 18-64  BMI >24.9.  Age 65+ with BMI <23 or >30 (Optional):741258}     reports that  has never smoked. she has never used smokeless tobacco.  {Tobacco Cessation -- Complete if patient is a smoker (Optional):338855}    Counseling Resources:  ATP IV Guidelines  Pooled Cohorts Equation Calculator  Breast Cancer Risk Calculator  FRAX Risk " Assessment  ICSI Preventive Guidelines  Dietary Guidelines for Americans, 2010  USDA's MyPlate  ASA Prophylaxis  Lung CA Screening    Brooklynn Leyva MD, MD  [facility]

## 2019-03-28 NOTE — LETTER
April 4, 2019    Aliyah Askew  [address]  [facility] [place] [address]    Dear ,  This letter is regarding your recent Pap smear (cervical cancer screening) and Human Papillomavirus (HPV) test.  We are happy to inform you that your Pap smear result is normal. Cervical cancer is closely linked with certain types of HPV. Your results showed no evidence of high-risk HPV.  Therefore we recommend you return in 5 years for your next pap smear and HPV test.  You will still need to return to the clinic every year for an annual exam and other preventive tests.  If you have additional questions regarding this result, please call our registered nurse, Magalie at [phone].  Sincerely,    Brooklynn Leyva MD/[facility]

## 2019-04-01 LAB
COPATH REPORT: NORMAL
PAP: NORMAL

## 2019-04-02 LAB
FINAL DIAGNOSIS: NORMAL
HPV HR 12 DNA CVX QL NAA+PROBE: NEGATIVE
HPV16 DNA SPEC QL NAA+PROBE: NEGATIVE
HPV18 DNA SPEC QL NAA+PROBE: NEGATIVE
SPECIMEN DESCRIPTION: NORMAL
SPECIMEN SOURCE CVX/VAG CYTO: NORMAL

## 2019-04-22 NOTE — PROGRESS NOTES
SUBJECTIVE:   CC: Aliyah Askew is an 53 year old woman who presents for preventive health visit.     Healthy Habits:     Getting at least 3 servings of Calcium per day:  Yes    Bi-annual eye exam:  NO    Dental care twice a year:  Yes    Sleep apnea or symptoms of sleep apnea:  Daytime drowsiness    Diet:  Regular (no restrictions)    Frequency of exercise:  4-5 days/week    Duration of exercise:  30-45 minutes    Taking medications regularly:  Yes    Medication side effects:  Not applicable    PHQ-2 Total Score: 2    Additional concerns today:  Yes    Timing from the neighbor's house continues.  She is starting to look into buying a different home.  Her house is not ready to sell yet, but she is beginning to prepare.  She is sad about the prospect of leaving her neighbors.  She decided to pause in seeing [facility] right now. She says those visits have been very helpful, but right now is to the point that she is ready to seriously contemplate a move.     She continues to have urge incontinence and plans on seeing PT and urology again.     She would like to wait on her  tetanus vaccine until she does not have to work for a couple of days. 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     Today's PHQ-2 Score:   PHQ-2 ( 1999 Pfizer) 4/25/2019   Q1: Little interest or pleasure in doing things 1   Q2: Feeling down, depressed or hopeless 1   PHQ-2 Score 2   Q1: Little interest or pleasure in doing things Several days   Q2: Feeling down, depressed or hopeless Several days   PHQ-2 Score 2       Abuse: Current or Past(Physical, Sexual or Emotional)- No  Do you feel safe in your environment? Yes    Social History     Tobacco Use     Smoking status: Never Smoker     Smokeless tobacco: Never Used   Substance Use Topics     Alcohol use: Yes     Comment: 5 drinks per week     If you drink alcohol do you typically have >3 drinks per day or >7 drinks per week? Not applicable    Alcohol Use 4/25/2019    Prescreen: >3 drinks/day or >7 drinks/week? Yes   Prescreen: >3 drinks/day or >7 drinks/week? -   AUDIT SCORE  10     AUDIT - Alcohol Use Disorders Identification Test - Reproduced from the World Health Organization Audit 2001 (Second Edition) 4/25/2019   1.  How often do you have a drink containing alcohol? 2 to 3 times a week   2.  How many drinks containing alcohol do you have on a typical day when you are drinking? 1 or 2   3.  How often do you have five or more drinks on one occasion? Never   4.  How often during the last year have you found that you were not able to stop drinking once you had started? Never   5.  How often during the last year have you failed to do what was normally expected of you because of drinking? Never   6.  How often during the last year have you needed a first drink in the morning to get yourself going after a heavy drinking session? Never   7.  How often during the last year have you had a feeling of guilt or remorse after drinking? Weekly   8.  How often during the last year have you been unable to remember what happened the night before because of your drinking? Never   9.  Have you or someone else been injured because of your drinking? No   10. Has a relative, friend, doctor or other health care worker been concerned about your drinking or suggested you cut down? Yes, during the last year   TOTAL SCORE 10       Reviewed orders with patient.  Reviewed health maintenance and updated orders accordingly - Yes  BP Readings from Last 3 Encounters:   04/25/19 113/72   03/28/19 123/75   04/20/18 118/68    Wt Readings from Last 3 Encounters:   04/25/19 65 kg (143 lb 4 oz)   04/20/18 61.7 kg (136 lb)   01/10/18 59 kg (130 lb)                  Patient Active Problem List   Diagnosis     CARDIOVASCULAR SCREENING; LDL GOAL LESS THAN 160     Major depression     Vitamin D deficiency     Leukopenia     S/P laparoscopic supracervical hysterectomy     Urinary urgency     Urge incontinence      Right knee pain     WALLACE (generalized anxiety disorder)     Past Surgical History:   Procedure Laterality Date     ABDOMEN SURGERY  6.15.16    Hysterectomy     BIOPSY  16     HYSTERECTOMY, SUPRACERVICAL LAPAROSCOPIC  6/15/16     LAPAROSCOPIC HYSTERECTOMY TOTAL, SALPINGECTOMY BILATERAL N/A 6/15/2016    Procedure: LAPAROSCOPIC HYSTERECTOMY TOTAL, SALPINGECTOMY BILATERAL;  Surgeon: Kavita Manley MD;  Location: [facility] OR     LAPAROSCOPIC SALPINGECTOMY Bilateral 6/15/16       Social History     Tobacco Use     Smoking status: Never Smoker     Smokeless tobacco: Never Used   Substance Use Topics     Alcohol use: Yes     Comment: 5 drinks per week     Family History   Problem Relation Age of Onset     Heart Disease Father         early 40's had first episode, ?heart related, age 42     Anxiety Disorder Father      Obesity Father      Diabetes Father      Hypertension Mother      Asthma Mother      Osteoporosis Mother      Arthritis Mother      Heart Disease Mother      Obesity Sister         s/p gastric bypass     Cancer Maternal Aunt         brain tumor     Eye Disorder Brother      Eye Disorder Sister      Diabetes Brother      Diabetes Brother      Obesity Brother      Hypertension Sister      Anxiety Disorder Sister      Thyroid Disease Sister      Obesity Sister      Anxiety Disorder Brother          Current Outpatient Medications   Medication Sig Dispense Refill     eszopiclone (LUNESTA) 1 MG TABS tablet Take 1 tablet (1 mg) by mouth nightly as needed for sleep 30 tablet 1     Multiple Vitamins-Minerals (MULTIVITAMIN ADULT PO)        traZODone (DESYREL) 50 MG tablet Take 1-2 tablets ( mg) by mouth nightly as needed for sleep 30 tablet 1     VITAMIN D, CHOLECALCIFEROL, PO Take by mouth daily       Allergies   Allergen Reactions     Adhesive Tape      Aminoglycosides      Ofloxacin      eye drops     Recent Labs   Lab Test 14  1226 10/26/12  1116   LDL  --  108   HDL  --  60   TRIG  --  79   CR 0.81   --    GFRESTIMATED 76  --    GFRESTBLACK >90   GFR Calc    --    POTASSIUM 4.8  --    TSH 1.36  --         Mammogram Screening: Patient over age 50, mutual decision to screen reflected in health maintenance.    Pertinent mammograms are reviewed under the imaging tab.  History of abnormal Pap smear:   NO - age 30-65 PAP every 5 years with negative HPV co-testing recommended  Last 3 Pap and HPV Results:   PAP / HPV Latest Ref Rng & Units 3/28/2019 3/16/2016 2013   PAP - NIL NIL NIL   HPV 16 DNA NEG:Negative Negative - -   HPV 18 DNA NEG:Negative Negative - -   OTHER HR HPV NEG:Negative Negative - -     PAP / HPV Latest Ref Rng & Units 3/28/2019 3/16/2016 2013   PAP - NIL NIL NIL   HPV 16 DNA NEG:Negative Negative - -   HPV 18 DNA NEG:Negative Negative - -   OTHER HR HPV NEG:Negative Negative - -     Reviewed and updated as needed this visit by clinical staff  Tobacco  Allergies  Meds         Reviewed and updated as needed this visit by Provider        Past Medical History:   Diagnosis Date     Closed fracture of unspecified bone     left fifth metacarpal fracture - cast     Depressive disorder Alway    from birth     Heart murmur      Leukopenia      and      Leukopenia      Major depression       Past Surgical History:   Procedure Laterality Date     ABDOMEN SURGERY  6.15.16    Hysterectomy     BIOPSY  16     HYSTERECTOMY, SUPRACERVICAL LAPAROSCOPIC  6/15/16     LAPAROSCOPIC HYSTERECTOMY TOTAL, SALPINGECTOMY BILATERAL N/A 6/15/2016    Procedure: LAPAROSCOPIC HYSTERECTOMY TOTAL, SALPINGECTOMY BILATERAL;  Surgeon: Kavita Manley MD;  Location: [facility]     LAPAROSCOPIC SALPINGECTOMY Bilateral 6/15/16     OB History    Para Term  AB Living   0 0 0 0 0 0   SAB TAB Ectopic Multiple Live Births   0 0 0 0 0       Review of Systems   Constitutional: Negative for chills and fever.   HENT: Negative for congestion, ear pain, hearing loss and sore throat.    Eyes:  "Negative for pain and visual disturbance.   Respiratory: Negative for cough and shortness of breath.    Cardiovascular: Negative for chest pain, palpitations and peripheral edema.   Gastrointestinal: Negative for abdominal pain, constipation, diarrhea, heartburn, hematochezia and nausea.   Breasts:  Negative for tenderness, breast mass and discharge.   Genitourinary: Positive for frequency and urgency. Negative for dysuria, genital sores, hematuria, pelvic pain, vaginal bleeding and vaginal discharge.   Musculoskeletal: Positive for arthralgias, joint swelling and myalgias.   Skin: Negative for rash.   Neurological: Positive for headaches. Negative for dizziness, weakness and paresthesias.   Psychiatric/Behavioral: Positive for mood changes. The patient is nervous/anxious.         OBJECTIVE:   /72 (BP Location: Left arm, Patient Position: Sitting, Cuff Size: Adult Regular)   Pulse 51   Temp 97.9  F (36.6  C) (Oral)   Ht 1.588 m (5' 2.5\")   Wt 65 kg (143 lb 4 oz)   LMP 06/11/2016   SpO2 98%   BMI 25.78 kg/m    Physical Exam  GENERAL: healthy, alert and no distress  EYES: Eyes grossly normal to inspection, PERRL and conjunctivae and sclerae normal  HENT: ear canals and TM's normal, nose and mouth without ulcers or lesions  NECK: no adenopathy, no asymmetry, masses, or scars and thyroid normal to palpation  RESP: lungs clear to auscultation - no rales, rhonchi or wheezes  CV: regular rate and rhythm, normal S1 S2, no S3 or S4, no murmur, click or rub, no peripheral edema and peripheral pulses strong  ABDOMEN: soft, nontender, no hepatosplenomegaly, no masses and bowel sounds normal  MS: no gross musculoskeletal defects noted, no edema  SKIN: no suspicious lesions or rashes  NEURO: Normal strength and tone, mentation intact and speech normal  PSYCH: mentation appears normal, affect normal/bright    Diagnostic Test Results:  none     ASSESSMENT/PLAN:   1. Routine general medical examination at a [facility] " "facility  Pap is utd  Mammogram scheduled in May     2. Screen for colon cancer  Recommended colonoscopy. She would like to do colonoscopy, but prefers to schedule in the fall.     3. Visit for screening mammogram   she has scheduled mammogram in May   - MA SCREENING DIGITAL BILAT - Future  (s+30); Future    4. Immunization   tdap when she is ready and does not have to work she prefers  Recommended Shingrix vaccine      5. WALLACE (generalized anxiety disorder)  Was seeing [facility] for therapy and has paused that now as she is looking for a new house.     6. Urinary urgency  7. Urge incontinence  Referred to PT and urology at her last visit. She has not scheduled yet.     COUNSELING:  Reviewed preventive health counseling, as reflected in patient instructions    BP Readings from Last 1 Encounters:   04/25/19 113/72     Estimated body mass index is 25.78 kg/m  as calculated from the following:    Height as of this encounter: 1.588 m (5' 2.5\").    Weight as of this encounter: 65 kg (143 lb 4 oz).           reports that she has never smoked. She has never used smokeless tobacco.      Counseling Resources:  ATP IV Guidelines  Pooled Cohorts Equation Calculator  Breast Cancer Risk Calculator  FRAX Risk Assessment  ICSI Preventive Guidelines  Dietary Guidelines for Americans, 2010  USDA's MyPlate  ASA Prophylaxis  Lung CA Screening    Brooklynn Leyva MD   [facility]  "

## 2019-04-25 ENCOUNTER — OFFICE VISIT (OUTPATIENT)
Dept: FAMILY MEDICINE | Facility: CLINIC | Age: 54
End: 2019-04-25
Payer: COMMERCIAL

## 2019-04-25 VITALS
BODY MASS INDEX: 25.38 KG/M2 | OXYGEN SATURATION: 98 % | DIASTOLIC BLOOD PRESSURE: 72 MMHG | WEIGHT: 143.25 LBS | HEART RATE: 51 BPM | SYSTOLIC BLOOD PRESSURE: 113 MMHG | HEIGHT: 63 IN | TEMPERATURE: 97.9 F

## 2019-04-25 DIAGNOSIS — Z12.31 VISIT FOR SCREENING MAMMOGRAM: ICD-10-CM

## 2019-04-25 DIAGNOSIS — N39.41 URGE INCONTINENCE: ICD-10-CM

## 2019-04-25 DIAGNOSIS — Z00.00 ROUTINE GENERAL MEDICAL EXAMINATION AT A HEALTH CARE FACILITY: Primary | ICD-10-CM

## 2019-04-25 DIAGNOSIS — R39.15 URINARY URGENCY: ICD-10-CM

## 2019-04-25 DIAGNOSIS — F41.1 GAD (GENERALIZED ANXIETY DISORDER): ICD-10-CM

## 2019-04-25 DIAGNOSIS — Z23 NEED FOR PROPHYLACTIC VACCINATION WITH TETANUS-DIPHTHERIA (TD): ICD-10-CM

## 2019-04-25 DIAGNOSIS — Z12.11 SCREEN FOR COLON CANCER: ICD-10-CM

## 2019-04-25 PROCEDURE — 99396 PREV VISIT EST AGE 40-64: CPT | Performed by: FAMILY MEDICINE

## 2019-04-25 PROCEDURE — 82947 ASSAY GLUCOSE BLOOD QUANT: CPT | Performed by: FAMILY MEDICINE

## 2019-04-25 PROCEDURE — 36415 COLL VENOUS BLD VENIPUNCTURE: CPT | Performed by: FAMILY MEDICINE

## 2019-04-25 PROCEDURE — 80061 LIPID PANEL: CPT | Performed by: FAMILY MEDICINE

## 2019-04-25 ASSESSMENT — ANXIETY QUESTIONNAIRES
GAD7 TOTAL SCORE: 8
2. NOT BEING ABLE TO STOP OR CONTROL WORRYING: MORE THAN HALF THE DAYS
5. BEING SO RESTLESS THAT IT IS HARD TO SIT STILL: NOT AT ALL
6. BECOMING EASILY ANNOYED OR IRRITABLE: MORE THAN HALF THE DAYS
IF YOU CHECKED OFF ANY PROBLEMS ON THIS QUESTIONNAIRE, HOW DIFFICULT HAVE THESE PROBLEMS MADE IT FOR YOU TO DO YOUR WORK, TAKE CARE OF THINGS AT HOME, OR GET ALONG WITH OTHER PEOPLE: SOMEWHAT DIFFICULT
3. WORRYING TOO MUCH ABOUT DIFFERENT THINGS: MORE THAN HALF THE DAYS
1. FEELING NERVOUS, ANXIOUS, OR ON EDGE: SEVERAL DAYS
7. FEELING AFRAID AS IF SOMETHING AWFUL MIGHT HAPPEN: NOT AT ALL

## 2019-04-25 ASSESSMENT — ENCOUNTER SYMPTOMS
DYSURIA: 0
SORE THROAT: 0
HEMATURIA: 0
FEVER: 0
CHILLS: 0
MYALGIAS: 1
HEARTBURN: 0
EYE PAIN: 0
SHORTNESS OF BREATH: 0
NAUSEA: 0
PARESTHESIAS: 0
BREAST MASS: 0
DIARRHEA: 0
PALPITATIONS: 0
ABDOMINAL PAIN: 0
HEMATOCHEZIA: 0
COUGH: 0
FREQUENCY: 1
WEAKNESS: 0
NERVOUS/ANXIOUS: 1
DIZZINESS: 0
HEADACHES: 1
JOINT SWELLING: 1
CONSTIPATION: 0
ARTHRALGIAS: 1

## 2019-04-25 ASSESSMENT — PATIENT HEALTH QUESTIONNAIRE - PHQ9
5. POOR APPETITE OR OVEREATING: SEVERAL DAYS
SUM OF ALL RESPONSES TO PHQ QUESTIONS 1-9: 12

## 2019-04-25 ASSESSMENT — MIFFLIN-ST. JEOR: SCORE: 1220.97

## 2019-04-26 LAB
CHOLEST SERPL-MCNC: 219 MG/DL
GLUCOSE SERPL-MCNC: 92 MG/DL (ref 70–99)
HDLC SERPL-MCNC: 76 MG/DL
LDLC SERPL CALC-MCNC: 130 MG/DL
NONHDLC SERPL-MCNC: 143 MG/DL
TRIGL SERPL-MCNC: 67 MG/DL

## 2019-04-26 ASSESSMENT — ANXIETY QUESTIONNAIRES: GAD7 TOTAL SCORE: 8

## 2019-04-26 NOTE — PROGRESS NOTES
Discharge Note    Progress reporting period is from last progress note on   to Jan 16, 2019.    Aliyah failed to follow up and current status is unknown.  Please see information below for last relevant information on current status.  Patient seen for 1 visits.    SUBJECTIVE  Subjective changes noted by patient:  See eval  .  Current pain level is 4/10.     Previous pain level was   .   Changes in function:  Yes (See Goal flowsheet attached for changes in current functional level)  Adverse reaction to treatment or activity: None    OBJECTIVE  Changes noted in objective findings: See eval     ASSESSMENT/PLAN  Diagnosis: Right knee pain   Updated problem list and treatment plan:   Pain - HEP  Decreased function - HEP  Decreased strength - HEP  Impaired muscle performance - HEP  STG/LTGs have been met or progress has been made towards goals:  Yes, please see goal flowsheet for most current information  Assessment of Progress: current status is unknown.    Last current status:     Self Management Plans:  HEP  I have re-evaluated this patient and find that the nature, scope, duration and intensity of the therapy is appropriate for the medical condition of the patient.  Aliyah continues to require the following intervention to meet STG and LTG's:  HEP.    Recommendations:  Discharge with current home program.  Patient to follow up with MD as needed.    Please refer to the daily flowsheet for treatment today, total treatment time and time spent performing 1:1 timed codes.

## 2019-04-29 NOTE — RESULT ENCOUNTER NOTE
The results of your recent lipid (cholesterol) profile were mildly abnormal.    Here are the results:  Lab Results       Component                Value               Date                       CHOL                     219                 04/25/2019            Lab Results       Component                Value               Date                       HDL                      76                  04/25/2019            Lab Results       Component                Value               Date                       LDL                      130                 04/25/2019            Lab Results       Component                Value               Date                       TRIG                     67                  04/25/2019            Lab Results       Component                Value               Date                       CHOLHDLRATIO             3.1                 10/26/2012              Desired or goal levels are:  CHOLESTEROL: Desirable is less than 200.   HDL (Good Cholesterol): Desirable is greater than 40 (for men) greater than 50 (for women).  LDL (Bad Cholesterol): Desirable is less than 130 (or less than 100 if you have heart disease or diabetes). Borderline 130-160.  TRIGLYCERIDES: Desirable is less than 150.  Borderline is 150-200.    To help lower your LDL (bad cholesterol) you could start adding ground flax seed to your diet. The recommended dose is 2 heaping tablespoonfuls daily, you may want to start with 1 tablespoonful and increase to 2 heaping tablespoonfuls. You can mix or add ground flax seed to hot cereals, yogurt, applesauce, cottage cheese or any sauce mixture that is your portion. Ground flax seed can be found in the baking aisle near the flour.      As you may know, an elevated cholesterol is one factor that increases your risk for heart disease and stroke. You can improve your cholesterol by controlling the amount and type of fat you eat and by increasing your daily activity level.    Here are some  ways to improve your nutrition:  Eat less fat (especially butter, Crisco and other saturated fats)  Buy lean cuts of meat, reduce your portions of red meat or substitute poultry or fish  Use skim milk and low-fat dairy products  Eat no more than 4 egg yolks per week  Avoid fried or fast foods that are high in fat  Eat more fruits and vegetables      Also consider starting or increasing your aerobic activity. Aerobic activity is the best way to improve HDL (good) cholesterol. If this would be new to you, please talk with me first about what activities are safe for you.      Other lab results:    Your glucose (blood sugar) was normal.     Please feel free to contact us with any questions or if you would like more information.        Brooklynn Leyva M.D.

## 2019-04-30 ENCOUNTER — TELEPHONE (OUTPATIENT)
Dept: FAMILY MEDICINE | Facility: CLINIC | Age: 54
End: 2019-04-30

## 2019-04-30 NOTE — TELEPHONE ENCOUNTER
Lipids can change around the time of menopause. We would treat based on risk factors using the ASCVD risk calculation to guide in decision-making regarding medication. Her risk based on this calculation is not high enough to warrant taking medication for her cholesterol. Her HDL is great and actually helps improve her risk. She does not have reason to be worried based on her lipid results. We will continue to monitor every 5 years and more often as needed. Brooklynn Leyva M.D.          Lab Results   Component Value Date    CHOL 219 04/25/2019     Lab Results   Component Value Date    HDL 76 04/25/2019     Lab Results   Component Value Date     04/25/2019     Lab Results   Component Value Date    TRIG 67 04/25/2019     Lab Results   Component Value Date    CHOLHDLRATIO 3.1 10/26/2012          The 10-year ASCVD risk score (Obdulia GONZALEZ Jr., et al., 2013) is: 0.9%    Values used to calculate the score:      Age: 53 years      Sex: Female      Is Non- : No      Diabetic: No      Tobacco smoker: No      Systolic Blood Pressure: 113 mmHg      Is BP treated: No      HDL Cholesterol: 76 mg/dL      Total Cholesterol: 219 mg/dL

## 2019-04-30 NOTE — LETTER
[facility]  [address] [place] [id number] [phone]          May 6, 2019    Aliyah Askew                                                                                                                     [address] [facility] [address]            Dear Aliyah,    We tried contacting you but were unable to reach you.      Dr. Leyva wanted to let you know cholesterol levels can change around the time of menopause.  Treatment for elevated cholesterol levels is based on risk factors using the ASCVD risk calculation to help guide decision making when it comes to starting on a medication.      Dr. Leyva says your risk based on this calculation is not high enough to warrant taking medication for your cholesterol.  Your HDL cholesterol result is a very good result and actually helps reduce any risk.  You do not have a reason to be concerned based on your cholesterol results.  Dr. Leyva recommends continuing to monitor your cholesterol levels every 5 years and more often, if needed.          Sincerely,         Your [facility] Care Team

## 2019-04-30 NOTE — TELEPHONE ENCOUNTER
Reason for Call:  Request for results:    Name of test or procedure: Lipids    Date of test of procedure: 4/25    Location of the test or procedure: [facility]    OK to leave the result message on voice mail or with a family member? NO    Phone number Patient can be reached at:  Home number on file [phone] (home)    Additional comments: Pt is concerned about her levels.       Call taken on 4/30/2019 at 9:18 AM by Lucita Hilliard

## 2019-05-01 ENCOUNTER — ANCILLARY PROCEDURE (OUTPATIENT)
Dept: MAMMOGRAPHY | Facility: CLINIC | Age: 54
End: 2019-05-01
Payer: COMMERCIAL

## 2019-05-01 DIAGNOSIS — Z12.31 VISIT FOR SCREENING MAMMOGRAM: ICD-10-CM

## 2019-05-01 NOTE — TELEPHONE ENCOUNTER
Left message to call back and ask to speak with an available triage nurse.  SHAMEKA Rose, LAIN, RN

## 2019-05-06 NOTE — TELEPHONE ENCOUNTER
No return call from patient.    [employer] messages not read.    Letter mailed to patient.  LAI CristinaN, RN

## 2019-05-13 ENCOUNTER — THERAPY VISIT (OUTPATIENT)
Dept: PHYSICAL THERAPY | Facility: CLINIC | Age: 54
End: 2019-05-13
Payer: COMMERCIAL

## 2019-05-13 DIAGNOSIS — S83.92XD SPRAIN OF LEFT KNEE, UNSPECIFIED LIGAMENT, SUBSEQUENT ENCOUNTER: ICD-10-CM

## 2019-05-13 PROBLEM — S83.92XA SPRAIN OF LEFT KNEE: Status: ACTIVE | Noted: 2019-05-13

## 2019-05-13 PROCEDURE — 97161 PT EVAL LOW COMPLEX 20 MIN: CPT | Mod: GP | Performed by: PHYSICAL THERAPIST

## 2019-05-13 PROCEDURE — 97110 THERAPEUTIC EXERCISES: CPT | Mod: GP | Performed by: PHYSICAL THERAPIST

## 2019-05-13 ASSESSMENT — ACTIVITIES OF DAILY LIVING (ADL)
PAIN: THE SYMPTOM AFFECTS MY ACTIVITY SEVERELY
AS_A_RESULT_OF_YOUR_KNEE_INJURY,_HOW_WOULD_YOU_RATE_YOUR_CURRENT_LEVEL_OF_DAILY_ACTIVITY?: SEVERELY ABNORMAL
KNEE_ACTIVITY_OF_DAILY_LIVING_SCORE: 52.86
WEAKNESS: THE SYMPTOM AFFECTS MY ACTIVITY MODERATELY
HOW_WOULD_YOU_RATE_THE_OVERALL_FUNCTION_OF_YOUR_KNEE_DURING_YOUR_USUAL_DAILY_ACTIVITIES?: SEVERELY ABNORMAL
RAW_SCORE: 37
SWELLING: I HAVE THE SYMPTOM BUT IT DOES NOT AFFECT MY ACTIVITY
GO DOWN STAIRS: ACTIVITY IS FAIRLY DIFFICULT
RISE FROM A CHAIR: ACTIVITY IS SOMEWHAT DIFFICULT
KNEEL ON THE FRONT OF YOUR KNEE: ACTIVITY IS FAIRLY DIFFICULT
STIFFNESS: THE SYMPTOM AFFECTS MY ACTIVITY SLIGHTLY
LIMPING: THE SYMPTOM AFFECTS MY ACTIVITY SLIGHTLY
STAND: ACTIVITY IS SOMEWHAT DIFFICULT
SIT WITH YOUR KNEE BENT: ACTIVITY IS SOMEWHAT DIFFICULT
WALK: ACTIVITY IS FAIRLY DIFFICULT
SQUAT: ACTIVITY IS FAIRLY DIFFICULT
KNEE_ACTIVITY_OF_DAILY_LIVING_SUM: 37
GO UP STAIRS: ACTIVITY IS FAIRLY DIFFICULT
HOW_WOULD_YOU_RATE_THE_CURRENT_FUNCTION_OF_YOUR_KNEE_DURING_YOUR_USUAL_DAILY_ACTIVITIES_ON_A_SCALE_FROM_0_TO_100_WITH_100_BEING_YOUR_LEVEL_OF_KNEE_FUNCTION_PRIOR_TO_YOUR_INJURY_AND_0_BEING_THE_INABILITY_TO_PERFORM_ANY_OF_YOUR_USUAL_DAILY_ACTIVITIES?: 75
GIVING WAY, BUCKLING OR SHIFTING OF KNEE: I DO NOT HAVE THE SYMPTOM

## 2019-05-13 NOTE — PROGRESS NOTES
[facility] Initial Evaluation  Subjective:  The history is provided by the patient. No  was used.   Aliyah Askew is a 53 year old female with a left knee condition.  Condition occurred with:  Running.  Condition occurred: during recreation/sport.  This is a new condition  5/9/19. Patient started having L knee pain with running 5-6 miles on 5/9/19. She notes it was an easy run. She reports history of R knee injury when she tripped while running in Dec. 2018. She recalls landing on R knee. She experienced pain and swelling of her R knee at that time. Her R knee has been slowly improving since that injury. She has been running for 12 years..    Patient reports pain:  Medial.    Pain is described as aching and sharp and is constant and reported as 5/10.   Pain is the same all the time.  Symptoms are exacerbated by descending stairs, running and bending/squatting and relieved by rest.  Since onset symptoms are unchanged.        General health as reported by patient is fair.  Pertinent medical history includes:  Depression, incontinence and menopausal.  Medical allergies: yes (adhesives).  Other surgeries include:  Other (Hysterectomy).  Current medications:  Muscle relaxants.  Current occupation is Works at [employer].  Patient is working in normal job without restrictions.  Primary job tasks include:  Prolonged standing, lifting, repetitive tasks and other (computer work, pushing/pulling).    Barriers include:  None as reported by the patient.    Red flags:  None as reported by the patient.                        Objective:  Standing Alignment:    Cervical/Thoracic:  Forward head  Shoulder/UE:  Rounded shoulders              Gait:    Assistive Devices:  None  Deviations:  Pelvis:  Incr pelvic rotationGeneral Deviations:  Toe in R and toe in L                                                      Knee Evaluation:  ROM:    AROM      Extension:  Left: WNL -    Right:  WNL  Flexion:  Left: sligh loss -    Right: WNL        Strength:         Quad Set Left:  WNL    Pain: -   Quad Set Right:  WNL    Pain: -    Special Tests:   Left knee positive for the following special tests:  Meniscal  Left knee negative for the following special tests:  IT Band Friction    Right knee negative for the following special tests:  Meniscal and IT Band Friction  Palpation:  Palpation of knee: TTP over medial L tibial plateau.  Left knee tenderness present at:  Medial Joint Line  Left knee tenderness not present at:  Lateral Joint Line; Patellar Tendon; IT Band and Popliteal    Edema:  Edema of the knee: Mild edema posterior L knee.      Functional Testing:          Quad:    Single Leg Squat:  Left:       Right:        Bilateral Leg Squat:  Mild medial L knee  Moderate loss of control and excessive anterior knee excursion      Proprioception:   Stork Balance Test:  Left:  Normal control, no pain  Right:  Normal control, no pain  % of Uninvolved:           General     ROS    Assessment/Plan:    Patient is a 53 year old female with left side knee complaints.    Patient has the following significant findings with corresponding treatment plan.                Diagnosis 1:  Sprain of left knee  Pain -  hot/cold therapy, manual therapy, self management, education, directional preference exercise and home program  Decreased ROM/flexibility - manual therapy, therapeutic exercise and home program  Decreased strength - therapeutic exercise, therapeutic activities and home program  Decreased proprioception - neuro re-education, therapeutic activities and home program  Impaired muscle performance - neuro re-education and home program  Decreased function - therapeutic activities and home program  Impaired posture - neuro re-education and home program    Therapy Evaluation Codes:   1) History comprised of:   Personal factors that impact the plan of care:      Time since onset of symptoms.    Comorbidity factors that impact the plan  of care are:      Depression and Menopausal.     Medications impacting care: Muscle relaxant.  2) Examination of Body Systems comprised of:   Body structures and functions that impact the plan of care:      Knee.   Activity limitations that impact the plan of care are:      Cooking, Dressing, Lifting, Running, Sitting, Squatting/kneeling, Stairs and Walking.  3) Clinical presentation characteristics are:   Stable/Uncomplicated.  4) Decision-Making    Low complexity using standardized patient assessment instrument and/or measureable assessment of functional outcome.  Cumulative Therapy Evaluation is: Low complexity.    Previous and current functional limitations:  (See Goal Flow Sheet for this information)    Short term and Long term goals: (See Goal Flow Sheet for this information)     Communication ability:  Patient appears to be able to clearly communicate and understand verbal and written communication and follow directions correctly.  Treatment Explanation - The following has been discussed with the patient:   RX ordered/plan of care  Anticipated outcomes  Possible risks and side effects  This patient would benefit from PT intervention to resume normal activities.   Rehab potential is good.    Frequency:  1 X week, once daily  Duration:  for 6 weeks tapering to 1 X every other week over 4 weeks  Discharge Plan:  Achieve all LTG.  Independent in home treatment program.  Reach maximal therapeutic benefit.    Please refer to the daily flowsheet for treatment today, total treatment time and time spent performing 1:1 timed codes.

## 2019-05-13 NOTE — LETTER
[facility]THLETIC [facility] PHYSICAL [facility]  [address] [address]  [phone]    May 13, 2019    Re: Ailyah Askew   :   1965  MRN:  [id number]   REFERRING PHYSICIAN:   Nick York    [facility]THLET[facility] PHYSICAL [facility]    Date of Initial Evaluation:  2019  Visits:  Rxs Used: 1  Reason for Referral:  Sprain of left knee, unspecified ligament, subsequent encounter    EVALUATION SUMMARY    [facility] Initial Evaluation    Subjective:  The history is provided by the patient. No  was used.   Aliyah Askew is a 53 year old female with a left knee condition.  Condition occurred with:  Running.  Condition occurred: during recreation/sport.  This is a new condition  19. Patient started having L knee pain with running 5-6 miles on 19. She notes it was an easy run. She reports history of R knee injury when she tripped while running in Dec. 2018. She recalls landing on R knee. She experienced pain and swelling of her R knee at that time. Her R knee has been slowly improving since that injury. She has been running for 12 years..    Patient reports pain:  Medial.    Pain is described as aching and sharp and is constant and reported as 5/10.   Pain is the same all the time.  Symptoms are exacerbated by descending stairs, running and bending/squatting and relieved by rest.  Since onset symptoms are unchanged.        General health as reported by patient is fair.  Pertinent medical history includes:  Depression, incontinence and menopausal.  Medical allergies: yes (adhesives).  Other surgeries include:  Other (Hysterectomy).  Current medications:  Muscle relaxants.  Current occupation is Works at [employer].  Patient is working in normal job without restrictions.  Primary job tasks include:  Prolonged standing, lifting, repetitive tasks and other (computer work, pushing/pulling).    Barriers  include:  None as reported by the patient.    Red flags:  None as reported by the patient.    Objective:  Standing Alignment:    Cervical/Thoracic:  Forward head  Shoulder/UE:  Rounded shoulders    Gait:    Assistive Devices:  None  Deviations:  Pelvis:  Incr pelvic rotationGeneral Deviations:  Toe in R and toe in L  Re: Aliyah Askew   :   1965  Knee Evaluation  ROM:    AROM  Extension:  Left: WNL -    Right:  WNL  Flexion: Left: sligh loss -    Right: WNL    Strength:   Quad Set Left:  WNL    Pain: -   Quad Set Right:  WNL    Pain: -    Special Tests:   Left knee positive for the following special tests:  Meniscal  Left knee negative for the following special tests:  IT Band Friction  Right knee negative for the following special tests:  Meniscal and IT Band Friction    Palpation:  Palpation of knee: TTP over medial L tibial plateau.  Left knee tenderness present at:  Medial Joint Line  Left knee tenderness not present at:  Lateral Joint Line; Patellar Tendon; IT Band and Popliteal    Edema:  Edema of the knee: Mild edema posterior L knee.    Functional Testing:    Quad:    Single Leg Squat:  Left:       Right:        Bilateral Leg Squat:  Mild medial L knee  Moderate loss of control and excessive anterior knee excursion      Proprioception:   Stork Balance Test:  Left:  Normal control, no pain  Right:  Normal control, no pain  % of Uninvolved:     Assessment/Plan:    Patient is a 53 year old female with left side knee complaints.    Patient has the following significant findings with corresponding treatment plan.                Diagnosis 1:  Sprain of left knee  Pain -  hot/cold therapy, manual therapy, self management, education, directional preference exercise and home program  Decreased ROM/flexibility - manual therapy, therapeutic exercise and home program  Decreased strength - therapeutic exercise, therapeutic activities and home program  Decreased proprioception - neuro re-education, therapeutic  activities and home program  Impaired muscle performance - neuro re-education and home program  Decreased function - therapeutic activities and home program  Impaired posture - neuro re-education and home program    Therapy Evaluation Codes:   1) History comprised of:   Personal factors that impact the plan of care:      Time since onset of symptoms.    Comorbidity factors that impact the plan of care are:    Re: Aliyah Askew   :   1965      Depression and Menopausal.     Medications impacting care: Muscle relaxant.  2) Examination of Body Systems comprised of:   Body structures and functions that impact the plan of care:      Knee.   Activity limitations that impact the plan of care are:      Cooking, Dressing, Lifting, Running, Sitting, Squatting/kneeling, Stairs and   Walking.  3) Clinical presentation characteristics are:   Stable/Uncomplicated.  4) Decision-Making    Low complexity using standardized patient assessment instrument and/or   measureable assessment of functional outcome.  Cumulative Therapy Evaluation is: Low complexity.    Previous and current functional limitations:  (See Goal Flow Sheet for this information)    Short term and Long term goals: (See Goal Flow Sheet for this information)     Communication ability:  Patient appears to be able to clearly communicate and understand verbal and written communication and follow directions correctly.  Treatment Explanation - The following has been discussed with the patient:   RX ordered/plan of care  Anticipated outcomes  Possible risks and side effects  This patient would benefit from PT intervention to resume normal activities.   Rehab potential is good.    Frequency:  1 X week, once daily  Duration:  for 6 weeks tapering to 1 X every other week over 4 weeks  Discharge Plan:  Achieve all LTG.  Independent in home treatment program.  Reach maximal therapeutic benefit.    Please refer to the daily flowsheet for treatment today, total treatment  time and time spent performing 1:1 timed codes.       Thank you for your referral.      INQUIRIES  Therapist: Henry Lujan DPT  [facility] PHYSICAL THERAPY  [address] [place] [id number]  Phone: [phone]  Fax: [phone]

## 2019-05-30 ENCOUNTER — THERAPY VISIT (OUTPATIENT)
Dept: PHYSICAL THERAPY | Facility: CLINIC | Age: 54
End: 2019-05-30
Payer: COMMERCIAL

## 2019-05-30 DIAGNOSIS — S83.92XD SPRAIN OF LEFT KNEE, UNSPECIFIED LIGAMENT, SUBSEQUENT ENCOUNTER: ICD-10-CM

## 2019-05-30 PROCEDURE — 97112 NEUROMUSCULAR REEDUCATION: CPT | Mod: GP | Performed by: PHYSICAL THERAPIST

## 2019-05-30 PROCEDURE — 97110 THERAPEUTIC EXERCISES: CPT | Mod: GP | Performed by: PHYSICAL THERAPIST

## 2019-06-06 ENCOUNTER — THERAPY VISIT (OUTPATIENT)
Dept: PHYSICAL THERAPY | Facility: CLINIC | Age: 54
End: 2019-06-06
Payer: COMMERCIAL

## 2019-06-06 DIAGNOSIS — S83.92XD SPRAIN OF LEFT KNEE, UNSPECIFIED LIGAMENT, SUBSEQUENT ENCOUNTER: ICD-10-CM

## 2019-06-06 PROCEDURE — 97112 NEUROMUSCULAR REEDUCATION: CPT | Mod: GP | Performed by: PHYSICAL THERAPIST

## 2019-06-06 PROCEDURE — 97110 THERAPEUTIC EXERCISES: CPT | Mod: GP | Performed by: PHYSICAL THERAPIST

## 2019-06-06 PROCEDURE — 97140 MANUAL THERAPY 1/> REGIONS: CPT | Mod: GP | Performed by: PHYSICAL THERAPIST

## 2019-06-13 ENCOUNTER — THERAPY VISIT (OUTPATIENT)
Dept: PHYSICAL THERAPY | Facility: CLINIC | Age: 54
End: 2019-06-13
Payer: COMMERCIAL

## 2019-06-13 DIAGNOSIS — S83.92XD SPRAIN OF LEFT KNEE, UNSPECIFIED LIGAMENT, SUBSEQUENT ENCOUNTER: ICD-10-CM

## 2019-06-13 PROCEDURE — 97112 NEUROMUSCULAR REEDUCATION: CPT | Mod: GP | Performed by: PHYSICAL THERAPIST

## 2019-06-13 PROCEDURE — 97140 MANUAL THERAPY 1/> REGIONS: CPT | Mod: GP | Performed by: PHYSICAL THERAPIST

## 2019-06-13 PROCEDURE — 97110 THERAPEUTIC EXERCISES: CPT | Mod: GP | Performed by: PHYSICAL THERAPIST

## 2019-06-20 ENCOUNTER — THERAPY VISIT (OUTPATIENT)
Dept: PHYSICAL THERAPY | Facility: CLINIC | Age: 54
End: 2019-06-20
Payer: COMMERCIAL

## 2019-06-20 DIAGNOSIS — S83.92XD SPRAIN OF LEFT KNEE, UNSPECIFIED LIGAMENT, SUBSEQUENT ENCOUNTER: ICD-10-CM

## 2019-06-20 PROCEDURE — 97110 THERAPEUTIC EXERCISES: CPT | Mod: GP | Performed by: PHYSICAL THERAPIST

## 2019-06-20 PROCEDURE — 97112 NEUROMUSCULAR REEDUCATION: CPT | Mod: GP | Performed by: PHYSICAL THERAPIST

## 2019-07-11 ENCOUNTER — THERAPY VISIT (OUTPATIENT)
Dept: PHYSICAL THERAPY | Facility: CLINIC | Age: 54
End: 2019-07-11
Payer: COMMERCIAL

## 2019-07-11 DIAGNOSIS — S83.92XD SPRAIN OF LEFT KNEE, UNSPECIFIED LIGAMENT, SUBSEQUENT ENCOUNTER: ICD-10-CM

## 2019-07-11 PROCEDURE — 97112 NEUROMUSCULAR REEDUCATION: CPT | Mod: GP | Performed by: PHYSICAL THERAPIST

## 2019-07-11 PROCEDURE — 97110 THERAPEUTIC EXERCISES: CPT | Mod: GP | Performed by: PHYSICAL THERAPIST

## 2019-07-11 PROCEDURE — 97140 MANUAL THERAPY 1/> REGIONS: CPT | Mod: GP | Performed by: PHYSICAL THERAPIST

## 2019-07-11 NOTE — LETTER
[facility] ATHLETIC [facility] PHYSICAL [facility]  [address] [address]  [phone]    2019    Re: Aliyah Askew   :   1965  MRN:  [id number]   REFERRING PHYSICIAN:   Nick York DO    [facility] ATHLETIC [facility]    Date of Initial Evaluation:  19  Visits:  Rxs Used: 6  Reason for Referral:  Sprain of left knee, unspecified ligament, subsequent encounter    EVALUATION SUMMARY    Subjective:  The history is provided by the patient. No  was used.                  Objective:  Standing Alignment:      Pelvic:  ASIS high R    Gait:    Gait Type:  Normal   Assistive Devices:  None       Knee Evaluation:    Edema:  Normal    Quad:    Single Leg Squat:  Left:      No pain  Mild loss of control and excessive anterior knee excursion  Right:       Mild loss of control and excessive anterior knee excursion  Bilateral Leg Squat:        PROGRESS  REPORT  Progress reporting period is from 19 to 19.       SUBJECTIVE  Subjective changes noted by patient:  Patient reports her L knee feels better overall. She has been walking. She has tried short periods of running which did not increase her L knee pain. She plans to continue to wait on running for now d/t intermittent medial L knee pain.       Current Pain level: 0/10.     Initial Pain level: 5/10.   Changes in function:  Yes (See Goal flowsheet attached for changes in current functional level) Adverse reaction to treatment or activity: None  Re: Aliyah Askew   :   1965             OBJECTIVE  Changes noted in objective findings:  Yes, see objective findings.    ASSESSMENT/PLAN  Updated problem list and treatment plan: Diagnosis 1:  Sprain of left knee  Pain -  hot/cold therapy, manual therapy, self management, education, directional preference exercise and home program  Decreased ROM/flexibility - manual therapy, therapeutic exercise and  home program  Decreased strength - therapeutic exercise, therapeutic activities and home program  Decreased proprioception - neuro re-education, therapeutic activities and home program  Impaired muscle performance - neuro re-education and home program  Decreased function - therapeutic activities and home program  Impaired posture - neuro re-education and home program  STG/LTGs have been met or progress has been made towards goals:  Yes (See Goal flow sheet completed today.)  Assessment of Progress: The patient's condition is improving.  Self Management Plans:  Patient has been instructed in a home treatment program.  Patient  has been instructed in self management of symptoms.  I have re-evaluated this patient and find that the nature, scope, duration and intensity of the therapy is appropriate for the medical condition of the patient.  Aliyah continues to require the following intervention to meet STG and LTG's:  PT    Recommendations:  This patient would benefit from continued therapy.     Frequency:  1 X week, once daily  Duration:  for 1 weeks tapering to 1 X every other week over 4 weeks    Please refer to the daily flowsheet for treatment today, total treatment time and time spent performing 1:1 timed codes.      Thank you for your referral.    INQUIRIES  Therapist: Henry Lujan DPT   [facility] PHYSICAL THERAPY  [address] [place] [id number]  Phone: [phone]  Fax: [phone]

## 2019-07-16 NOTE — PROGRESS NOTES
Subjective:  The history is provided by the patient. No  was used.                       Objective:  Standing Alignment:          Pelvic:  ASIS high R          Gait:    Gait Type:  Normal   Assistive Devices:  None                                                        Knee Evaluation:          Edema:  Normal      Functional Testing:          Quad:    Single Leg Squat:  Left:      No pain  Mild loss of control and excessive anterior knee excursion  Right:       Mild loss of control and excessive anterior knee excursion  Bilateral Leg Squat:                General     ROS    Assessment/Plan:    PROGRESS  REPORT    Progress reporting period is from 5/13/19 to 7/11/19.       SUBJECTIVE  Subjective changes noted by patient:  Patient reports her L knee feels better overall. She has been walking. She has tried short periods of running which did not increase her L knee pain. She plans to continue to wait on running for now d/t intermittent medial L knee pain.       Current Pain level: 0/10.     Initial Pain level: 5/10.   Changes in function:  Yes (See Goal flowsheet attached for changes in current functional level)  Adverse reaction to treatment or activity: None    OBJECTIVE  Changes noted in objective findings:  Yes, see objective findings.    ASSESSMENT/PLAN  Updated problem list and treatment plan: Diagnosis 1:  Sprain of left knee  Pain -  hot/cold therapy, manual therapy, self management, education, directional preference exercise and home program  Decreased ROM/flexibility - manual therapy, therapeutic exercise and home program  Decreased strength - therapeutic exercise, therapeutic activities and home program  Decreased proprioception - neuro re-education, therapeutic activities and home program  Impaired muscle performance - neuro re-education and home program  Decreased function - therapeutic activities and home program  Impaired posture - neuro re-education and home program  STG/LTGs have  been met or progress has been made towards goals:  Yes (See Goal flow sheet completed today.)  Assessment of Progress: The patient's condition is improving.  Self Management Plans:  Patient has been instructed in a home treatment program.  Patient  has been instructed in self management of symptoms.  I have re-evaluated this patient and find that the nature, scope, duration and intensity of the therapy is appropriate for the medical condition of the patient.  Aliyah continues to require the following intervention to meet STG and LTG's:  PT    Recommendations:  This patient would benefit from continued therapy.     Frequency:  1 X week, once daily  Duration:  for 1 weeks tapering to 1 X every other week over 4 weeks        Please refer to the daily flowsheet for treatment today, total treatment time and time spent performing 1:1 timed codes.

## 2019-07-18 ENCOUNTER — THERAPY VISIT (OUTPATIENT)
Dept: PHYSICAL THERAPY | Facility: CLINIC | Age: 54
End: 2019-07-18
Payer: COMMERCIAL

## 2019-07-18 DIAGNOSIS — S83.92XD SPRAIN OF LEFT KNEE, UNSPECIFIED LIGAMENT, SUBSEQUENT ENCOUNTER: ICD-10-CM

## 2019-07-18 PROCEDURE — 97112 NEUROMUSCULAR REEDUCATION: CPT | Mod: GP | Performed by: PHYSICAL THERAPIST

## 2019-07-18 PROCEDURE — 97110 THERAPEUTIC EXERCISES: CPT | Mod: GP | Performed by: PHYSICAL THERAPIST

## 2019-07-18 PROCEDURE — 97140 MANUAL THERAPY 1/> REGIONS: CPT | Mod: GP | Performed by: PHYSICAL THERAPIST

## 2019-08-13 ENCOUNTER — TELEPHONE (OUTPATIENT)
Dept: FAMILY MEDICINE | Facility: CLINIC | Age: 54
End: 2019-08-13

## 2019-08-13 NOTE — TELEPHONE ENCOUNTER
Team coordinators-Please offer patient appt with any clinic provider this week.  May use same day hold.  Unfortunately, no openings for D.r Gordon this week.    Thank you!  LAI CristinaN, RN

## 2019-08-13 NOTE — TELEPHONE ENCOUNTER
Reason for Call:  Other appointment and call back    Detailed comments: Patient would like to know if PCP can squeeze her in this week. Patient's knee was aggravated while hiking and PT recommends Follow-up with primary (SEE MYC 7/31). Patient would like to get in asap due to work. Please advise, thank you!    Phone Number Patient can be reached at: Home number on file [phone] (home)    Best Time: anytime    Can we leave a detailed message on this number? YES    Call taken on 8/13/2019 at 10:28 AM by Mya Agosto

## 2019-08-28 NOTE — PROGRESS NOTES
"  SUBJECTIVE:   Aliyah Askew is a 54 year old female who presents to clinic today for the following health issues:    Answers for HPI/ROS submitted by the patient on 8/29/2019   If you checked off any problems, how difficult have these problems made it for you to do your work, take care of things at home, or get along with other people?: Somewhat difficult  PHQ9 TOTAL SCORE: 5  WALLACE 7 TOTAL SCORE: 5    Musculoskeletal problem/pain      Duration: End of April beginning of may and then It happened again in December     Description  Location: majority of it is in the left side and some pain on the right side    Intensity:  mild    Accompanying signs and symptoms: tingling in her feet     History  Previous similar problem: YES but never this severe   [facility]     Precipitating or alleviating factors:  Trauma or overuse: YES- she ended up falling down and injuring both knees   Aggravating factors include: walking, climbing stairs, exercise and overuse    Therapies tried and outcome: physical therapy and KT tape     She was seen by sports medicine in May and was diagnosed with a left knee sprain.  Physical therapy was recommended, which she did.  It was also recommended that she limit running and she was low impact activities such as biking or swimming instead.  She was instructed to follow-up if her symptoms were not improving at that point they would consider advanced imaging.    She initially injured her left knee last spring and had been improving some over the summer with PT and was getting taped as well. It had not resolved and she continued to experience some degree of knee pain and has not been back to her full running routine.     Two weeks ago, she injured her knee again on 8/12/19. She had done a small amount of running in the morning and it was feeling \"not great, but okay\" in the morning. She went to her [employer] shift and was working in a department that has not been hard on her " "knee. [employer] was very busy and her manager asked her to go to another department to help out and she says she was not wearing the shoes she should have and it is typically more physical work in the department. She went to help because she was asked and when she was working in this department, she \"tweaked\" her knee. She is not sure what exactly happened. She was trying to move a cart and thinks she twisted her knee. Feels like a little different spot that is in pain than the area that was initially a problem.        Problem list and histories reviewed & adjusted, as indicated.  Additional history: as documented    Patient Active Problem List   Diagnosis     CARDIOVASCULAR SCREENING; LDL GOAL LESS THAN 160     Major depression     Vitamin D deficiency     Leukopenia     S/P laparoscopic supracervical hysterectomy     Urinary urgency     Urge incontinence     Right knee pain     WALLACE (generalized anxiety disorder)     Sprain of left knee     Past Surgical History:   Procedure Laterality Date     ABDOMEN SURGERY  6.15.16    Hysterectomy     BIOPSY  16     HYSTERECTOMY, SUPRACERVICAL LAPAROSCOPIC  6/15/16     LAPAROSCOPIC HYSTERECTOMY TOTAL, SALPINGECTOMY BILATERAL N/A 6/15/2016    Procedure: LAPAROSCOPIC HYSTERECTOMY TOTAL, SALPINGECTOMY BILATERAL;  Surgeon: Kavita Manley MD;  Location: [facility] OR     LAPAROSCOPIC SALPINGECTOMY Bilateral 6/15/16       Social History     Tobacco Use     Smoking status: Never Smoker     Smokeless tobacco: Never Used   Substance Use Topics     Alcohol use: Yes     Comment: 5 drinks per week     Family History   Problem Relation Age of Onset     Heart Disease Father         early 40's had first episode, ?heart related, age 42     Anxiety Disorder Father      Obesity Father      Diabetes Father      Hypertension Mother      Asthma Mother      Osteoporosis Mother      Arthritis Mother      Heart Disease Mother      Obesity Sister         s/p gastric bypass     Cancer Maternal Aunt       " "  brain tumor     Eye Disorder Brother      Eye Disorder Sister      Diabetes Brother      Diabetes Brother      Obesity Brother      Hypertension Sister      Anxiety Disorder Sister      Thyroid Disease Sister      Obesity Sister      Anxiety Disorder Brother          Current Outpatient Medications   Medication Sig Dispense Refill     eszopiclone (LUNESTA) 1 MG TABS tablet Take 1 tablet (1 mg) by mouth nightly as needed for sleep 30 tablet 1     Multiple Vitamins-Minerals (MULTIVITAMIN ADULT PO)        traZODone (DESYREL) 50 MG tablet Take 1-2 tablets ( mg) by mouth nightly as needed for sleep 30 tablet 1     VITAMIN D, CHOLECALCIFEROL, PO Take by mouth daily       Allergies   Allergen Reactions     Adhesive Tape      Aminoglycosides      Ofloxacin      eye drops     Recent Labs   Lab Test 04/25/19  1141 12/17/14  1226 10/26/12  1116   *  --  108   HDL 76  --  60   TRIG 67  --  79   CR  --  0.81  --    GFRESTIMATED  --  76  --    GFRESTBLACK  --  >90   GFR Calc    --    POTASSIUM  --  4.8  --    TSH  --  1.36  --       BP Readings from Last 3 Encounters:   08/29/19 112/68   04/25/19 113/72   03/28/19 123/75    Wt Readings from Last 3 Encounters:   08/29/19 59.2 kg (130 lb 8 oz)   04/25/19 65 kg (143 lb 4 oz)   04/20/18 61.7 kg (136 lb)              Reviewed and updated as needed this visit by clinical staff  Tobacco  Allergies  Meds       Reviewed and updated as needed this visit by Provider         ROS:  As above     OBJECTIVE:     /68 (BP Location: Left arm, Patient Position: Sitting, Cuff Size: Adult Regular)   Pulse 60   Temp 98.3  F (36.8  C) (Oral)   Ht 1.588 m (5' 2.5\")   Wt 59.2 kg (130 lb 8 oz)   LMP 06/11/2016   SpO2 95%   BMI 23.49 kg/m    Body mass index is 23.49 kg/m .  GENERAL: healthy, alert and no distress  Left knee:  Swelling: no  Ecchymosis: no  Gait: normal  Medial tenderness: yes  Lateral tenderness: no  Anterior tenderness: no  Posterior tenderness: " no  Extension: normal range, some discomfort at full extensoin  Flexion: normal range, some discomfort at full flexion  Mc Ricci's: negativ  Lachman's / Anterior drawer: negative  Valgus stress: negative  Varus stress: negative       Distal CMS: intact          Diagnostic Test Results:  none     ASSESSMENT/PLAN:            ICD-10-CM    1. Left knee pain, unspecified chronicity M25.562 ORTHO  REFERRAL       Patient Instructions   1) Schedule with orthopedics.   2) Avoid running and other high impact activities in the meantime.         Brooklynn Leyva M.D.        [facility]

## 2019-08-29 ENCOUNTER — OFFICE VISIT (OUTPATIENT)
Dept: FAMILY MEDICINE | Facility: CLINIC | Age: 54
End: 2019-08-29
Payer: COMMERCIAL

## 2019-08-29 VITALS
TEMPERATURE: 98.3 F | OXYGEN SATURATION: 95 % | SYSTOLIC BLOOD PRESSURE: 112 MMHG | DIASTOLIC BLOOD PRESSURE: 68 MMHG | WEIGHT: 130.5 LBS | BODY MASS INDEX: 23.12 KG/M2 | HEART RATE: 60 BPM | HEIGHT: 63 IN

## 2019-08-29 DIAGNOSIS — M25.562 LEFT KNEE PAIN, UNSPECIFIED CHRONICITY: Primary | ICD-10-CM

## 2019-08-29 PROCEDURE — 99213 OFFICE O/P EST LOW 20 MIN: CPT | Performed by: FAMILY MEDICINE

## 2019-08-29 ASSESSMENT — ANXIETY QUESTIONNAIRES
GAD7 TOTAL SCORE: 5
7. FEELING AFRAID AS IF SOMETHING AWFUL MIGHT HAPPEN: SEVERAL DAYS
3. WORRYING TOO MUCH ABOUT DIFFERENT THINGS: SEVERAL DAYS
2. NOT BEING ABLE TO STOP OR CONTROL WORRYING: SEVERAL DAYS
6. BECOMING EASILY ANNOYED OR IRRITABLE: SEVERAL DAYS
7. FEELING AFRAID AS IF SOMETHING AWFUL MIGHT HAPPEN: SEVERAL DAYS
GAD7 TOTAL SCORE: 5
GAD7 TOTAL SCORE: 5
5. BEING SO RESTLESS THAT IT IS HARD TO SIT STILL: NOT AT ALL
4. TROUBLE RELAXING: NOT AT ALL
1. FEELING NERVOUS, ANXIOUS, OR ON EDGE: SEVERAL DAYS

## 2019-08-29 ASSESSMENT — PATIENT HEALTH QUESTIONNAIRE - PHQ9
SUM OF ALL RESPONSES TO PHQ QUESTIONS 1-9: 5
10. IF YOU CHECKED OFF ANY PROBLEMS, HOW DIFFICULT HAVE THESE PROBLEMS MADE IT FOR YOU TO DO YOUR WORK, TAKE CARE OF THINGS AT HOME, OR GET ALONG WITH OTHER PEOPLE: SOMEWHAT DIFFICULT
SUM OF ALL RESPONSES TO PHQ QUESTIONS 1-9: 5

## 2019-08-29 ASSESSMENT — MIFFLIN-ST. JEOR: SCORE: 1158.13

## 2019-08-29 NOTE — PATIENT INSTRUCTIONS
1) Schedule with orthopedics.   2) Avoid running and other high impact activities in the meantime.

## 2019-08-30 ASSESSMENT — PATIENT HEALTH QUESTIONNAIRE - PHQ9: SUM OF ALL RESPONSES TO PHQ QUESTIONS 1-9: 5

## 2019-08-30 ASSESSMENT — ANXIETY QUESTIONNAIRES: GAD7 TOTAL SCORE: 5

## 2019-09-12 ENCOUNTER — OFFICE VISIT (OUTPATIENT)
Dept: ORTHOPEDICS | Facility: CLINIC | Age: 54
End: 2019-09-12
Payer: COMMERCIAL

## 2019-09-12 VITALS
HEIGHT: 63 IN | DIASTOLIC BLOOD PRESSURE: 58 MMHG | WEIGHT: 130 LBS | BODY MASS INDEX: 23.04 KG/M2 | SYSTOLIC BLOOD PRESSURE: 112 MMHG

## 2019-09-12 DIAGNOSIS — M22.42 CHONDROMALACIA OF LEFT PATELLA: Primary | ICD-10-CM

## 2019-09-12 DIAGNOSIS — M25.562 ACUTE PAIN OF LEFT KNEE: ICD-10-CM

## 2019-09-12 PROCEDURE — 99204 OFFICE O/P NEW MOD 45 MIN: CPT | Performed by: FAMILY MEDICINE

## 2019-09-12 ASSESSMENT — MIFFLIN-ST. JEOR: SCORE: 1155.87

## 2019-09-12 NOTE — LETTER
"    9/12/2019         RE: Aliyah Askew  [address]  [place] [address]        Dear Colleague,    Thank you for referring your patient, Aliyah Askew, to the [facility]. Please see a copy of my visit note below.    ASSESSMENT & PLAN    1. Chondromalacia of left patella    2. Acute pain of left knee      Seen in consultation for subacute left knee pain, atraumatic in nature  Very active female, daily runner.   Has participated in structured therapy. Unable to resume normal activity / running  Concern for degenerative meniscal tearing or root tear.  MRI of your left knee has been ordered. Schedule with [facility] ([phone]) due to insurance coverage    [facility] next steps.    -----    SUBJECTIVE  Aliyah Askew is a/an 54 year old female who is seen in consultation at the request of  Brooklynn Leyva M.D. for evaluation of left knee pain. The patient is seen by themselves.    Onset: 4 month(s) ago. Reports insidious onset without acute precipitating event. Patient also reports that she did injure her left knee on 8/12/19 while at work. She states she was moving a large cart to her left when she felt a \"ping\" in her anteromedial knee. She notes that the pain due to the acute injury has improved since then with rest and acupuncture.  Location of Pain: left posterior and medial knee  Rating of Pain at worst: 9/10  Rating of Pain Currently: 4/10  Worsened by: running, sitting back on heels, moving positions in bed  Better with: ice  Treatments tried: rest/activity avoidance, ice, home exercises, physical therapy (9 visits @ [facility] - last visit in July 2019) and previous imaging (xray 12/26/18 @ [facility])  Associated symptoms: initial swelling in May but she has not noticed anything recently  Orthopedic history: NO  Relevant surgical history: NO  Patient Social History: works at [employer]    Patient's past medical, surgical, social, and family histories were reviewed today " "and no pertinent history related to patient's presenting problem.    REVIEW OF SYSTEMS:  10 point ROS is negative other than symptoms noted above in HPI, Past Medical History or as stated below  Constitutional: NEGATIVE for fever, chills, change in weight  Skin: NEGATIVE for worrisome rashes, moles or lesions  GI/: NEGATIVE for bowel or bladder changes  Neuro: NEGATIVE for weakness, dizziness or paresthesias    OBJECTIVE:  /58   Ht 1.588 m (5' 2.5\")   Wt 59 kg (130 lb)   LMP 06/11/2016   BMI 23.40 kg/m      General: healthy, alert and in no distress  HEENT: no scleral icterus or conjunctival erythema  Skin: no suspicious lesions or rash. No jaundice.  CV: no pedal edema  Resp: normal respiratory effort without conversational dyspnea   Psych: normal mood and affect  Gait: normal steady gait with appropriate coordination and balance  Neuro: Normal light sensory exam of lower extremity  MSK:  LEFT KNEE  Inspection:    normal alignment  Palpation:    NonTender about the lateral patellar facet, medial patellar facet. Feels discomfort over medial and posterolateral joint line but not tender to palpation.     No effusion is present    Patellofemoral crepitus is Present  Range of Motion:     00 extension to 1350 flexion  Strength:    Gluteus 5/5    Extensor mechanism intact  Special Tests:    Negative: Patellar grind, MCL/valgus stress (0 & 30 deg), LCL/varus stress (0 & 30 deg), Lachman's, posterior drawer, Adrian's    Independent visualization of the below image:  Reviewed outside images from [facility] dated 12/26/18 (imported into PACS)  Well preserved tibiofemoral and patellofemoral joint. Mild lateral patellar spurring bilaterally.  No acute osseous findings / fractures.    Stefanie Brown DO [facility]      Again, thank you for allowing me to participate in the care of your patient.        Sincerely,        Stefanie Brown DO    "

## 2019-09-12 NOTE — PATIENT INSTRUCTIONS
1. Chondromalacia of left patella    2. Acute pain of left knee      Given lack of improvement with therapy and persistent pain recommend imaging  MRI of your left knee has been ordered. Schedule with [facility] ([phone]).    Follow-up over MyChart with results / next steps.

## 2019-09-12 NOTE — PROGRESS NOTES
"ASSESSMENT & PLAN    1. Chondromalacia of left patella    2. Acute pain of left knee      Seen in consultation for subacute left knee pain, atraumatic in nature  Very active female, daily runner.   Has participated in structured therapy. Unable to resume normal activity / running  Concern for degenerative meniscal tearing or root tear.  MRI of your left knee has been ordered. Schedule with [facility] ([phone]) due to insurance coverage    Follow-up over [facility] with results / next steps.    -----    SUBJECTIVE  Aliyah Askew is a/an 54 year old female who is seen in consultation at the request of  Brooklynn Leyva M.D. for evaluation of left knee pain. The patient is seen by themselves.    Onset: 4 month(s) ago. Reports insidious onset without acute precipitating event. Patient also reports that she did injure her left knee on 8/12/19 while at work. She states she was moving a large cart to her left when she felt a \"ping\" in her anteromedial knee. She notes that the pain due to the acute injury has improved since then with rest and acupuncture.  Location of Pain: left posterior and medial knee  Rating of Pain at worst: 9/10  Rating of Pain Currently: 4/10  Worsened by: running, sitting back on heels, moving positions in bed  Better with: ice  Treatments tried: rest/activity avoidance, ice, home exercises, physical therapy (9 visits @ [facility] - last visit in July 2019) and previous imaging (xray 12/26/18 @ [facility])  Associated symptoms: initial swelling in May but she has not noticed anything recently  Orthopedic history: NO  Relevant surgical history: NO  Patient Social History: works at [employer]    Patient's past medical, surgical, social, and family histories were reviewed today and no pertinent history related to patient's presenting problem.    REVIEW OF SYSTEMS:  10 point ROS is negative other than symptoms noted above in HPI, Past Medical History or as stated below  Constitutional: NEGATIVE for fever, chills, change in " "weight  Skin: NEGATIVE for worrisome rashes, moles or lesions  GI/: NEGATIVE for bowel or bladder changes  Neuro: NEGATIVE for weakness, dizziness or paresthesias    OBJECTIVE:  /58   Ht 1.588 m (5' 2.5\")   Wt 59 kg (130 lb)   LMP 06/11/2016   BMI 23.40 kg/m     General: healthy, alert and in no distress  HEENT: no scleral icterus or conjunctival erythema  Skin: no suspicious lesions or rash. No jaundice.  CV: no pedal edema  Resp: normal respiratory effort without conversational dyspnea   Psych: normal mood and affect  Gait: normal steady gait with appropriate coordination and balance  Neuro: Normal light sensory exam of lower extremity  MSK:  LEFT KNEE  Inspection:    normal alignment  Palpation:    NonTender about the lateral patellar facet, medial patellar facet. Feels discomfort over medial and posterolateral joint line but not tender to palpation.     No effusion is present    Patellofemoral crepitus is Present  Range of Motion:     00 extension to 1350 flexion  Strength:    Gluteus 5/5    Extensor mechanism intact  Special Tests:    Negative: Patellar grind, MCL/valgus stress (0 & 30 deg), LCL/varus stress (0 & 30 deg), Lachman's, posterior drawer, Adrian's    Independent visualization of the below image:  Reviewed outside images from [facility] dated 12/26/18 (imported into PACS)  Well preserved tibiofemoral and patellofemoral joint. Mild lateral patellar spurring bilaterally.  No acute osseous findings / fractures.    Stefanie Brown,  [facility]    "

## 2019-09-12 NOTE — Clinical Note
Aleksandr Montejo,Thanks for recommending me!  Obtained an MRI to access meniscal degeneration v root tear.Will keep you in the loop.Stefanie

## 2019-09-19 ENCOUNTER — TELEPHONE (OUTPATIENT)
Dept: ORTHOPEDICS | Facility: CLINIC | Age: 54
End: 2019-09-19

## 2019-09-19 NOTE — TELEPHONE ENCOUNTER
Phone call to patient and informed of Dr. Brown's recommendations. She will continue checking cost and let us know if she decides to have it done elsewhere.   She may also check into [facility]. She can't have done at [facility] per her insurance. She states she does not want us to fax the order to [facility] at this time.     SHANIQUA Cruz RN

## 2019-09-19 NOTE — TELEPHONE ENCOUNTER
Aliyah Askew is a 54 year old female. Received voicemail from [facility] asking for orders for left knee MRI. We can fax order to: [phone].     If questions, she can be reached at: [phone]    Phone call to patient to clarify if patient was going to [facility] due to work comp or insurance? She states she does not think work comp will pay for it, so is using her insurance. [facility] is closer to her home and is in network for her. She would like us to send orders. However, she states she is still unsure if she will pursue the MRI even though she an appointment scheduled for 9/25/19. She is still in the process of finding her cost. She does not feel she wants to have surgery and states that Dr. Brown said surgery was not necessary. She wants to know why she is having pain and what her limitations would be, if any, so is going to wait to determine if cost is manageable. Asked that if she decides to have one done, to obtain images on a disk and follow up in the office with Dr. Brown.       SHANIQUA Cruz RN

## 2019-09-19 NOTE — TELEPHONE ENCOUNTER
[facility] and informed patient is still deciding if she will be using their facility. Patient does not want us to send order at this time. Informed patient will follow up if she wants to cancel appointment.     SHANIQUA Cruz RN

## 2019-09-19 NOTE — TELEPHONE ENCOUNTER
Per Dr. Brown, in her opinion, the imaging and interpretation/read of images is of poor quality making it difficult for her to make an accurate diagnosis.  She prefers patient have done elsewhere, but it is up to patient. Patient may still choose to have MRI done there if she would like.     Left voicemail asking for a return call regarding her MRI.     SHANIQUA Cruz RN

## 2019-09-30 ENCOUNTER — HEALTH MAINTENANCE LETTER (OUTPATIENT)
Age: 54
End: 2019-09-30

## 2019-10-30 ASSESSMENT — ANXIETY QUESTIONNAIRES
6. BECOMING EASILY ANNOYED OR IRRITABLE: SEVERAL DAYS
GAD7 TOTAL SCORE: 5
GAD7 TOTAL SCORE: 5
4. TROUBLE RELAXING: SEVERAL DAYS
5. BEING SO RESTLESS THAT IT IS HARD TO SIT STILL: NOT AT ALL
2. NOT BEING ABLE TO STOP OR CONTROL WORRYING: SEVERAL DAYS
7. FEELING AFRAID AS IF SOMETHING AWFUL MIGHT HAPPEN: NOT AT ALL
1. FEELING NERVOUS, ANXIOUS, OR ON EDGE: SEVERAL DAYS
GAD7 TOTAL SCORE: 5
7. FEELING AFRAID AS IF SOMETHING AWFUL MIGHT HAPPEN: NOT AT ALL
3. WORRYING TOO MUCH ABOUT DIFFERENT THINGS: SEVERAL DAYS

## 2019-10-30 ASSESSMENT — PATIENT HEALTH QUESTIONNAIRE - PHQ9
SUM OF ALL RESPONSES TO PHQ QUESTIONS 1-9: 6
10. IF YOU CHECKED OFF ANY PROBLEMS, HOW DIFFICULT HAVE THESE PROBLEMS MADE IT FOR YOU TO DO YOUR WORK, TAKE CARE OF THINGS AT HOME, OR GET ALONG WITH OTHER PEOPLE: SOMEWHAT DIFFICULT
SUM OF ALL RESPONSES TO PHQ QUESTIONS 1-9: 6

## 2019-10-31 ENCOUNTER — OFFICE VISIT (OUTPATIENT)
Dept: BEHAVIORAL HEALTH | Facility: CLINIC | Age: 54
End: 2019-10-31
Payer: COMMERCIAL

## 2019-10-31 DIAGNOSIS — F41.1 GAD (GENERALIZED ANXIETY DISORDER): Primary | ICD-10-CM

## 2019-10-31 PROCEDURE — 90834 PSYTX W PT 45 MINUTES: CPT | Performed by: SOCIAL WORKER

## 2019-10-31 ASSESSMENT — PATIENT HEALTH QUESTIONNAIRE - PHQ9: SUM OF ALL RESPONSES TO PHQ QUESTIONS 1-9: 6

## 2019-10-31 ASSESSMENT — ANXIETY QUESTIONNAIRES: GAD7 TOTAL SCORE: 5
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
## 2019-11-26 NOTE — MR AVS SNAPSHOT
After Visit Summary   11/15/2018    Aliyah Askew    MRN: [id number]           Patient Information     Date Of Birth          1965        Visit Information        Provider Department      11/15/2018 10:30 AM Henry Edwards, [facility]        Today's Diagnoses     Major depression    -  1       Follow-ups after your visit        Who to contact     If you have questions or need follow up information about today's clinic visit or your schedule please contact [facility] directly at [phone].  Normal or non-critical lab and imaging results will be communicated to you by [employer], letter or phone within 4 business days after the clinic has received the results. If you do not hear from us within 7 days, please contact the clinic through [employer] or phone. If you have a critical or abnormal lab result, we will notify you by phone as soon as possible.  Submit refill requests through [employer] or call your pharmacy and they will forward the refill request to us. Please allow 3 business days for your refill to be completed.          Additional Information About Your Visit        MyChart Information     [employer] gives you secure access to your electronic health record. If you see a primary care provider, you can also send messages to your care team and make appointments. If you have questions, please call your primary care clinic.  If you do not have a primary care provider, please call [phone] and they will assist you.        Care EveryWhere ID     This is your Care EveryWhere ID. This could be used by other organizations to access your [place] medical records  [id number]        Your Vitals Were     Last Period                   06/11/2016            Blood Pressure from Last 3 Encounters:   04/20/18 118/68   01/10/18 109/70   11/07/17 104/66    Weight from Last 3 Encounters:   04/20/18 61.7 kg (136 lb)   01/10/18 59 kg (130 lb)   11/07/17 56.2 kg (124 lb)     "BMI for Adults    Body mass index (BMI) is a number that is calculated from a person's weight and height. BMI may help to estimate how much of a person's weight is composed of fat. BMI can help identify those who may be at higher risk for certain medical problems.  How is BMI used with adults?  BMI is used as a screening tool to identify possible weight problems. It is used to check whether a person is obese, overweight, healthy weight, or underweight.  How is BMI calculated?  BMI measures your weight and compares it to your height. This can be done either in English (U.S.) or metric measurements. Note that charts are available to help you find your BMI quickly and easily without having to do these calculations yourself.  To calculate your BMI in English (U.S.) measurements, your health care provider will:  1. Measure your weight in pounds (lb).  2. Multiply the number of pounds by 703.  ? For example, for a person who weighs 180 lb, multiply that number by 703, which equals 126,540.  3. Measure your height in inches (in). Then multiply that number by itself to get a measurement called \"inches squared.\"  ? For example, for a person who is 70 in tall, the \"inches squared\" measurement is 70 in x 70 in, which equals 4900 inches squared.  4. Divide the total from Step 2 (number of lb x 703) by the total from Step 3 (inches squared): 126,540 ÷ 4900 = 25.8. This is your BMI.  To calculate your BMI in metric measurements, your health care provider will:  1. Measure your weight in kilograms (kg).  2. Measure your height in meters (m). Then multiply that number by itself to get a measurement called \"meters squared.\"  ? For example, for a person who is 1.75 m tall, the \"meters squared\" measurement is 1.75 m x 1.75 m, which is equal to 3.1 meters squared.  3. Divide the number of kilograms (your weight) by the meters squared number. In this example: 70 ÷ 3.1 = 22.6. This is your BMI.  How is BMI interpreted?  To interpret your "           Today, you had the following     No orders found for display       Primary Care Provider Office Phone # Fax #    Brooklynn Leyva [phone] [address]  [facility] [address]        Equal Access to Services     KRYSTAL RENDON : Hadii mat lua hadmagoo Soomaali, waaxda luqadaha, qaybta kaalmada adeaguilada, ania patricioin hayaan sulemancornel robledo criss brown. So [facility] [phone].    ATENCIÓN: Si habla español, tiene a orona disposición servicios gratuitos de asistencia lingüística. Llame al [phone].    We comply with applicable federal civil rights laws and [place] laws. We do not discriminate on the basis of race, color, national origin, age, disability, sex, sexual orientation, or gender identity.            Thank you!     Thank you for choosing [facility]  for your care. Our goal is always to provide you with excellent care. Hearing back from our patients is one way we can continue to improve our services. Please take a few minutes to complete the written survey that you may receive in the mail after your visit with us. Thank you!             Your Updated Medication List - Protect others around you: Learn how to safely use, store and throw away your medicines at www.disposemymeds.org.          This list is accurate as of 11/15/18  2:34 PM.  Always use your most recent med list.                   Brand Name Dispense Instructions for use Diagnosis    eszopiclone 1 MG Tabs tablet    LUNESTA    30 tablet    Take 1 tablet (1 mg) by mouth nightly as needed for sleep    Insomnia, unspecified type       ibuprofen 600 MG tablet    ADVIL/MOTRIN    30 tablet    Take 1 tablet (600 mg) by mouth every 6 hours as needed for pain (mild)    Post-op pain       MULTIVITAMIN ADULT PO           traZODone 50 MG tablet    DESYREL    30 tablet    Take 1-2 tablets ( mg) by mouth nightly as needed for sleep    Insomnia, unspecified type       VITAMIN D (CHOLECALCIFEROL) PO      Take by mouth  results, your health care provider will use BMI charts to identify whether you are underweight, normal weight, overweight, or obese. The following guidelines will be used:  · Underweight: BMI less than 18.5.  · Normal weight: BMI between 18.5 and 24.9.  · Overweight: BMI between 25 and 29.9.  · Obese: BMI of 30 and above.  Please note:  · Weight includes both fat and muscle, so someone with a muscular build, such as an athlete, may have a BMI that is higher than 24.9. In cases like these, BMI is not an accurate measure of body fat.  · To determine if excess body fat is the cause of a BMI of 25 or higher, further assessments may need to be done by a health care provider.  · BMI is usually interpreted in the same way for men and women.  Why is BMI a useful tool?  BMI is useful in two ways:  · Identifying a weight problem that may be related to a medical condition, or that may increase the risk for medical problems.  · Promoting lifestyle and diet changes in order to reach a healthy weight.  Summary  · Body mass index (BMI) is a number that is calculated from a person's weight and height.  · BMI may help to estimate how much of a person's weight is composed of fat. BMI can help identify those who may be at higher risk for certain medical problems.  · BMI can be measured using English measurements or metric measurements.  · To interpret your results, your health care provider will use BMI charts to identify whether you are underweight, normal weight, overweight, or obese.  This information is not intended to replace advice given to you by your health care provider. Make sure you discuss any questions you have with your health care provider.  Document Released: 08/29/2005 Document Revised: 10/31/2018 Document Reviewed: 10/31/2018  [employer] Interactive Patient Education © 2019 [employer]     daily

## 2019-12-12 ENCOUNTER — PATIENT OUTREACH (OUTPATIENT)
Dept: CARE COORDINATION | Facility: CLINIC | Age: 54
End: 2019-12-12

## 2019-12-12 ENCOUNTER — OFFICE VISIT (OUTPATIENT)
Dept: BEHAVIORAL HEALTH | Facility: CLINIC | Age: 54
End: 2019-12-12
Payer: COMMERCIAL

## 2019-12-12 DIAGNOSIS — F41.1 GAD (GENERALIZED ANXIETY DISORDER): Primary | ICD-10-CM

## 2019-12-12 PROCEDURE — 90834 PSYTX W PT 45 MINUTES: CPT | Performed by: SOCIAL WORKER

## 2019-12-12 NOTE — PROGRESS NOTES
Scheduled Follow Up Call: Attempt 1 Community Health Worker called and left a message for the patient. If the patient is returning my call, please transfer the patient to [facility] at [phone].    Referral Reason: Pt lost her job this month and is looking for insurance options. Please assist.

## 2019-12-13 ENCOUNTER — PATIENT OUTREACH (OUTPATIENT)
Dept: CARE COORDINATION | Facility: CLINIC | Age: 54
End: 2019-12-13

## 2019-12-13 NOTE — PROGRESS NOTES
Community Health Worker called and left a message for the patient.  If the patient is returning my call, please transfer the patient to [facility] at [phone].   Patient has been mailed an unreachable letter and was provided with [facility] contact information if they are interested in accessing Clinic Care Coordination.  Order for Care Management has been closed, no further outreach will be done at this time and patient can be re-referred.

## 2019-12-13 NOTE — LETTER
Dear Aliyah,                                                                         You were recently referred to the [facility]'s Clinic Care Coordination service.  This is a service offered through your Primary Care Clinic which can help you access resources and services in regard to your health and well-being goals. The clinic Community Health Worker has placed two calls to you to discuss the nature of this service and to offer enrollment.  If you are interested in learning more about Clinic Care Coordination, please call your Primary Care Clinic's Community Health Worker, Ciara, at [phone].                                                    Sincerely,                                                                                                                                                                   Clinic Care Coordination                                                  [facility]
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
## 2019-12-18 ENCOUNTER — OFFICE VISIT (OUTPATIENT)
Dept: BEHAVIORAL HEALTH | Facility: CLINIC | Age: 54
End: 2019-12-18
Payer: COMMERCIAL

## 2019-12-18 ENCOUNTER — TELEPHONE (OUTPATIENT)
Dept: FAMILY MEDICINE | Facility: CLINIC | Age: 54
End: 2019-12-18

## 2019-12-18 DIAGNOSIS — F41.1 GAD (GENERALIZED ANXIETY DISORDER): Primary | ICD-10-CM

## 2019-12-18 PROCEDURE — 90834 PSYTX W PT 45 MINUTES: CPT | Performed by: SOCIAL WORKER

## 2019-12-18 ASSESSMENT — ANXIETY QUESTIONNAIRES
GAD7 TOTAL SCORE: 3
1. FEELING NERVOUS, ANXIOUS, OR ON EDGE: SEVERAL DAYS
2. NOT BEING ABLE TO STOP OR CONTROL WORRYING: SEVERAL DAYS
5. BEING SO RESTLESS THAT IT IS HARD TO SIT STILL: NOT AT ALL
4. TROUBLE RELAXING: NOT AT ALL
7. FEELING AFRAID AS IF SOMETHING AWFUL MIGHT HAPPEN: NOT AT ALL
GAD7 TOTAL SCORE: 3
3. WORRYING TOO MUCH ABOUT DIFFERENT THINGS: SEVERAL DAYS
GAD7 TOTAL SCORE: 3
6. BECOMING EASILY ANNOYED OR IRRITABLE: NOT AT ALL
7. FEELING AFRAID AS IF SOMETHING AWFUL MIGHT HAPPEN: NOT AT ALL

## 2019-12-18 ASSESSMENT — PATIENT HEALTH QUESTIONNAIRE - PHQ9
SUM OF ALL RESPONSES TO PHQ QUESTIONS 1-9: 7
SUM OF ALL RESPONSES TO PHQ QUESTIONS 1-9: 7
10. IF YOU CHECKED OFF ANY PROBLEMS, HOW DIFFICULT HAVE THESE PROBLEMS MADE IT FOR YOU TO DO YOUR WORK, TAKE CARE OF THINGS AT HOME, OR GET ALONG WITH OTHER PEOPLE: NOT DIFFICULT AT ALL

## 2019-12-18 NOTE — TELEPHONE ENCOUNTER
Panel Management Review      Patient has the following on her problem list:     Depression / Dysthymia review    Measure:  Needs PHQ-9 score of 4 or less during index window.  Administer PHQ-9 and if score is 5 or more, send encounter to provider for next steps.    5 - 7 month window range:     PHQ-9 SCORE 8/29/2019 10/30/2019 10/30/2019   PHQ-9 Total Score - - -   PHQ-9 Total Score MyChart 5 (Mild depression) - 6 (Mild depression)   PHQ-9 Total Score 5 6 6       If PHQ-9 recheck is 5 or more, route to provider for next steps.    Patient is due for:  PHQ9      Composite cancer screening  Chart review shows that this patient is due/due soon for the following Colonoscopy  Summary:    Patient is due/failing the following:   COLONOSCOPY and PHQ9    Action needed:   Patient needs to do PHQ9.    Type of outreach:    Phone, spoke to patient.  and she is seeing psychiatry     Questions for provider review:    None                                                                                                                                    Yumiko Cristobal CMA on 12/18/2019 at 1:34 PM

## 2019-12-19 ASSESSMENT — ANXIETY QUESTIONNAIRES: GAD7 TOTAL SCORE: 3

## 2019-12-19 ASSESSMENT — PATIENT HEALTH QUESTIONNAIRE - PHQ9: SUM OF ALL RESPONSES TO PHQ QUESTIONS 1-9: 7
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
## 2019-12-30 ENCOUNTER — OFFICE VISIT (OUTPATIENT)
Dept: BEHAVIORAL HEALTH | Facility: CLINIC | Age: 54
End: 2019-12-30
Payer: COMMERCIAL

## 2019-12-30 DIAGNOSIS — F33.1 MODERATE EPISODE OF RECURRENT MAJOR DEPRESSIVE DISORDER (H): Primary | ICD-10-CM

## 2019-12-30 PROCEDURE — 90834 PSYTX W PT 45 MINUTES: CPT | Performed by: SOCIAL WORKER
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
## 2020-01-22 NOTE — PROGRESS NOTES
Patient did not return for further treatment and no additional progress was noted.  Please refer to the progress note and goal flowsheet completed on 07/11/19 for discharge information.

## 2020-03-27 PROBLEM — S83.92XA SPRAIN OF LEFT KNEE: Status: RESOLVED | Noted: 2019-05-13 | Resolved: 2020-03-27

## 2021-01-15 ENCOUNTER — HEALTH MAINTENANCE LETTER (OUTPATIENT)
Age: 56
End: 2021-01-15

## 2021-03-14 ENCOUNTER — HEALTH MAINTENANCE LETTER (OUTPATIENT)
Age: 56
End: 2021-03-14

## 2021-03-16 ENCOUNTER — TRANSFERRED RECORDS (OUTPATIENT)
Dept: HEALTH INFORMATION MANAGEMENT | Facility: CLINIC | Age: 56
End: 2021-03-16

## 2021-03-24 ENCOUNTER — TRANSFERRED RECORDS (OUTPATIENT)
Dept: HEALTH INFORMATION MANAGEMENT | Facility: CLINIC | Age: 56
End: 2021-03-24

## 2021-03-31 ENCOUNTER — OFFICE VISIT (OUTPATIENT)
Dept: FAMILY MEDICINE | Facility: CLINIC | Age: 56
End: 2021-03-31
Payer: COMMERCIAL

## 2021-03-31 VITALS
SYSTOLIC BLOOD PRESSURE: 133 MMHG | OXYGEN SATURATION: 100 % | TEMPERATURE: 97.8 F | HEART RATE: 59 BPM | DIASTOLIC BLOOD PRESSURE: 77 MMHG

## 2021-03-31 DIAGNOSIS — Z01.818 PREOP GENERAL PHYSICAL EXAM: Primary | ICD-10-CM

## 2021-03-31 DIAGNOSIS — G89.29 CHRONIC PAIN OF LEFT KNEE: ICD-10-CM

## 2021-03-31 DIAGNOSIS — M25.562 CHRONIC PAIN OF LEFT KNEE: ICD-10-CM

## 2021-03-31 LAB
BASOPHILS # BLD AUTO: 0 10E9/L (ref 0–0.2)
BASOPHILS NFR BLD AUTO: 0.5 %
DIFFERENTIAL METHOD BLD: ABNORMAL
EOSINOPHIL # BLD AUTO: 0 10E9/L (ref 0–0.7)
EOSINOPHIL NFR BLD AUTO: 1 %
ERYTHROCYTE [DISTWIDTH] IN BLOOD BY AUTOMATED COUNT: 12 % (ref 10–15)
HCT VFR BLD AUTO: 42.6 % (ref 35–47)
HGB BLD-MCNC: 14.1 G/DL (ref 11.7–15.7)
LYMPHOCYTES # BLD AUTO: 1.5 10E9/L (ref 0.8–5.3)
LYMPHOCYTES NFR BLD AUTO: 38.1 %
MCH RBC QN AUTO: 29.7 PG (ref 26.5–33)
MCHC RBC AUTO-ENTMCNC: 33.1 G/DL (ref 31.5–36.5)
MCV RBC AUTO: 90 FL (ref 78–100)
MONOCYTES # BLD AUTO: 0.4 10E9/L (ref 0–1.3)
MONOCYTES NFR BLD AUTO: 9 %
NEUTROPHILS # BLD AUTO: 2 10E9/L (ref 1.6–8.3)
NEUTROPHILS NFR BLD AUTO: 51.4 %
PLATELET # BLD AUTO: 235 10E9/L (ref 150–450)
RBC # BLD AUTO: 4.75 10E12/L (ref 3.8–5.2)
WBC # BLD AUTO: 3.9 10E9/L (ref 4–11)

## 2021-03-31 PROCEDURE — 85025 COMPLETE CBC W/AUTO DIFF WBC: CPT | Performed by: STUDENT IN AN ORGANIZED HEALTH CARE EDUCATION/TRAINING PROGRAM

## 2021-03-31 PROCEDURE — 80048 BASIC METABOLIC PNL TOTAL CA: CPT | Performed by: STUDENT IN AN ORGANIZED HEALTH CARE EDUCATION/TRAINING PROGRAM

## 2021-03-31 PROCEDURE — 99214 OFFICE O/P EST MOD 30 MIN: CPT | Performed by: STUDENT IN AN ORGANIZED HEALTH CARE EDUCATION/TRAINING PROGRAM

## 2021-03-31 PROCEDURE — 36415 COLL VENOUS BLD VENIPUNCTURE: CPT | Performed by: STUDENT IN AN ORGANIZED HEALTH CARE EDUCATION/TRAINING PROGRAM

## 2021-03-31 NOTE — PATIENT INSTRUCTIONS

## 2021-03-31 NOTE — PROGRESS NOTES
[facility] [address] [place] [address]  Phone: [phone]  Primary Provider: Brooklynn Leyva  Pre-op Performing Provider: KITTY DOMINGUEZ      PREOPERATIVE EVALUATION:  Today's date: 3/31/2021    Aliyah Askew is a 55 year old female who presents for a preoperative evaluation.    Surgical Information:  Surgery/Procedure: left knee procedure -arthroscopy for   Surgery Location: [facility]   Surgeon: Dr. Griggs   Surgery Date: 4-9-21  Time of Surgery: 3:00 PM  Where patient plans to recover: At home with family  Fax number for surgical facility: TBD    Type of Anesthesia Anticipated: Local with MAC    Assessment & Plan     The proposed surgical procedure is considered INTERMEDIATE risk.    Preop general physical exam  Chronic pain of left knee  Pre op exam for knee arthroscopy secondary to chronic pain and changes seen on MRI.   - Basic metabolic panel  (Ca, Cl, CO2, Creat, Gluc, K, Na, BUN)  - Asymptomatic COVID-19 Virus (Coronavirus) by PCR  - CBC with platelets and differential       Risks and Recommendations:  The patient has the following additional risks and recommendations for perioperative complications:   - No identified additional risk factors other than previously addressed    Medication Instructions:  Patient is on no chronic medications    RECOMMENDATION:  APPROVAL GIVEN to proceed with proposed procedure, without further diagnostic evaluation.    Subjective     HPI related to upcoming procedure: History of injury to the knee two years ago after a trip and fall. Worked with physical therapy at this. Noticed she was having pain with exercise despite dedicated therapy. She did complete an MRI that showed possible meniscal tear, bone bruise, cartilage damage.     Preop Questions 3/30/2021   1. Have you ever had a heart attack or stroke? No   2. Have you ever had surgery on your heart or blood vessels, such as a stent placement, a coronary artery  bypass, or surgery on an artery in your head, neck, heart, or legs? No   3. Do you have chest pain with activity? No   4. Do you have a history of  heart failure? No   5. Do you currently have a cold, bronchitis or symptoms of other infection? No   6. Do you have a cough, shortness of breath, or wheezing? No   7. Do you or anyone in your family have previous history of blood clots? No   8. Do you or does anyone in your family have a serious bleeding problem such as prolonged bleeding following surgeries or cuts? No   9. Have you ever had problems with anemia or been told to take iron pills? No   10. Have you had any abnormal blood loss such as black, tarry or bloody stools, or abnormal vaginal bleeding? No   11. Have you ever had a blood transfusion? No   12. Are you willing to have a blood transfusion if it is medically needed before, during, or after your surgery? Yes   13. Have you or any of your relatives ever had problems with anesthesia? No   14. Do you have sleep apnea, excessive snoring or daytime drowsiness? No   15. Do you have any artifical heart valves or other implanted medical devices like a pacemaker, defibrillator, or continuous glucose monitor? No   16. Do you have artificial joints? No   17. Are you allergic to latex? No   18. Is there any chance that you may be pregnant? No, history of hysterectomy       Health Care Directive:  Patient does not have a Health Care Directive or Living Will: Discussed advance care planning with patient; information given to patient to review.    Preoperative Review of :   reviewed - no record of controlled substances prescribed.    Status of Chronic Conditions:  See problem list for active medical problems.  Problems all longstanding and stable, except as noted/documented.  See ROS for pertinent symptoms related to these conditions.        Review of Systems  Constitutional, neuro, ENT, endocrine, pulmonary, cardiac, gastrointestinal, genitourinary,  musculoskeletal, integument and psychiatric systems are negative, except as otherwise noted.    Patient Active Problem List    Diagnosis Date Noted     WALLACE (generalized anxiety disorder) 05/15/2017     Priority: Medium     Right knee pain 12/23/2016     Priority: Medium     Urinary urgency 10/12/2016     Priority: Medium     Urge incontinence 10/12/2016     Priority: Medium     S/P laparoscopic supracervical hysterectomy 06/30/2016     Priority: Medium     Leukopenia      Priority: Medium     2010 and 2014       Vitamin D deficiency 10/26/2012     Priority: Medium     Major depression 05/26/2011     Priority: Medium     CARDIOVASCULAR SCREENING; LDL GOAL LESS THAN 160 05/09/2010     Priority: Medium      Past Medical History:   Diagnosis Date     Closed fracture of unspecified bone 1990    left fifth metacarpal fracture - cast     Depressive disorder Alway    from birth     Heart murmur      Leukopenia     2010 and 2014     Leukopenia      Major depression      Past Surgical History:   Procedure Laterality Date     ABDOMEN SURGERY  6.15.16    Hysterectomy     BIOPSY  5/9/16     HYSTERECTOMY, SUPRACERVICAL LAPAROSCOPIC  6/15/16     LAPAROSCOPIC HYSTERECTOMY TOTAL, SALPINGECTOMY BILATERAL N/A 6/15/2016    Procedure: LAPAROSCOPIC HYSTERECTOMY TOTAL, SALPINGECTOMY BILATERAL;  Surgeon: Kavita Manley MD;  Location: [facility]     LAPAROSCOPIC SALPINGECTOMY Bilateral 6/15/16     Current Outpatient Medications   Medication Sig Dispense Refill     Multiple Vitamins-Minerals (MULTIVITAMIN ADULT PO)          Allergies   Allergen Reactions     Adhesive Tape      Aminoglycosides      Iodine      Hx hives in 2016 after iodine prep vs adhesive tape     Ofloxacin      eye drops        Social History     Tobacco Use     Smoking status: Never Smoker     Smokeless tobacco: Never Used   Substance Use Topics     Alcohol use: Yes     Comment: 5 drinks per week     History   Drug Use No         Objective     /77   Pulse 59   Temp  97.8  F (36.6  C)   LMP 06/11/2016   SpO2 100%     Physical Exam    GENERAL APPEARANCE: healthy, alert and no distress     EYES: EOMI, PERRL     HENT: ear canals and TM's normal and nose and mouth without ulcers or lesions     NECK: no adenopathy, no asymmetry, masses, or scars and thyroid normal to palpation     RESP: lungs clear to auscultation - no rales, rhonchi or wheezes     CV: regular rates and rhythm, normal S1 S2, no S3 or S4 and no murmur, click or rub     ABDOMEN:  soft, nontender, no HSM or masses and bowel sounds normal     MS: extremities normal- no gross deformities noted, no evidence of inflammation in joints, FROM in all extremities.     SKIN: no suspicious lesions or rashes     NEURO: Normal strength and tone, sensory exam grossly normal, mentation intact and speech normal     PSYCH: mentation appears normal. and affect normal/bright     LYMPHATICS: No cervical adenopathy    No results for input(s): HGB, PLT, INR, NA, POTASSIUM, CR, A1C in the last 92119 hours.     Diagnostics:  Labs pending at this time.  Results will be reviewed when available.   Results for orders placed or performed in visit on 03/31/21   Basic metabolic panel  (Ca, Cl, CO2, Creat, Gluc, K, Na, BUN)     Status: Abnormal   Result Value Ref Range    Sodium 139 133 - 144 mmol/L    Potassium 4.5 3.4 - 5.3 mmol/L    Chloride 108 94 - 109 mmol/L    Carbon Dioxide 31 20 - 32 mmol/L    Anion Gap <1 (L) 3 - 14 mmol/L    Glucose 99 70 - 99 mg/dL    Urea Nitrogen 10 7 - 30 mg/dL    Creatinine 0.87 0.52 - 1.04 mg/dL    GFR Estimate 76 >60 mL/min/[1.73_m2]    GFR Estimate If Black 88 >60 mL/min/[1.73_m2]    Calcium 8.9 8.5 - 10.1 mg/dL   CBC with platelets and differential     Status: Abnormal   Result Value Ref Range    WBC 3.9 (L) 4.0 - 11.0 10e9/L    RBC Count 4.75 3.8 - 5.2 10e12/L    Hemoglobin 14.1 11.7 - 15.7 g/dL    Hematocrit 42.6 35.0 - 47.0 %    MCV 90 78 - 100 fl    MCH 29.7 26.5 - 33.0 pg    MCHC 33.1 31.5 - 36.5 g/dL    RDW  12.0 10.0 - 15.0 %    Platelet Count 235 150 - 450 10e9/L    % Neutrophils 51.4 %    % Lymphocytes 38.1 %    % Monocytes 9.0 %    % Eosinophils 1.0 %    % Basophils 0.5 %    Absolute Neutrophil 2.0 1.6 - 8.3 10e9/L    Absolute Lymphocytes 1.5 0.8 - 5.3 10e9/L    Absolute Monocytes 0.4 0.0 - 1.3 10e9/L    Absolute Eosinophils 0.0 0.0 - 0.7 10e9/L    Absolute Basophils 0.0 0.0 - 0.2 10e9/L    Diff Method Automated Method        No EKG required, no history of coronary heart disease, significant arrhythmia, peripheral arterial disease or other structural heart disease.    Revised Cardiac Risk Index (RCRI):  The patient has the following serious cardiovascular risks for perioperative complications:   - No serious cardiac risks = 0 points     RCRI Interpretation: 0 points: Class I (very low risk - 0.4% complication rate)     Signed Electronically by: Chinyere Waller MD  Copy of this evaluation report is provided to requesting physician.

## 2021-04-01 LAB
ANION GAP SERPL CALCULATED.3IONS-SCNC: <1 MMOL/L (ref 3–14)
BUN SERPL-MCNC: 10 MG/DL (ref 7–30)
CALCIUM SERPL-MCNC: 8.9 MG/DL (ref 8.5–10.1)
CHLORIDE SERPL-SCNC: 108 MMOL/L (ref 94–109)
CO2 SERPL-SCNC: 31 MMOL/L (ref 20–32)
CREAT SERPL-MCNC: 0.87 MG/DL (ref 0.52–1.04)
GFR SERPL CREATININE-BSD FRML MDRD: 76 ML/MIN/{1.73_M2}
GLUCOSE SERPL-MCNC: 99 MG/DL (ref 70–99)
POTASSIUM SERPL-SCNC: 4.5 MMOL/L (ref 3.4–5.3)
SODIUM SERPL-SCNC: 139 MMOL/L (ref 133–144)

## 2021-04-01 NOTE — RESULT ENCOUNTER NOTE
Labs reviewed and look good for surgery. Looks like your WBC white blood cell count has been borderline low in the past several years. Please schedule a complete physical and to discuss this with Dr. Leyva at your convenience.    Chinyere Waller MD  [facility]  [phone]

## 2021-04-15 DIAGNOSIS — Z12.31 VISIT FOR SCREENING MAMMOGRAM: ICD-10-CM

## 2021-04-15 PROCEDURE — 77067 SCR MAMMO BI INCL CAD: CPT | Mod: GC | Performed by: RADIOLOGY

## 2021-04-15 PROCEDURE — 77063 BREAST TOMOSYNTHESIS BI: CPT | Mod: GC | Performed by: RADIOLOGY

## 2021-04-16 ENCOUNTER — IMMUNIZATION (OUTPATIENT)
Dept: NURSING | Facility: CLINIC | Age: 56
End: 2021-04-16
Payer: COMMERCIAL

## 2021-04-16 PROCEDURE — 91300 PR COVID VAC PFIZER DIL RECON 30 MCG/0.3 ML IM: CPT

## 2021-04-16 PROCEDURE — 0001A PR COVID VAC PFIZER DIL RECON 30 MCG/0.3 ML IM: CPT

## 2021-05-07 ENCOUNTER — IMMUNIZATION (OUTPATIENT)
Dept: NURSING | Facility: CLINIC | Age: 56
End: 2021-05-07
Attending: INTERNAL MEDICINE
Payer: COMMERCIAL

## 2021-05-07 PROCEDURE — 0002A PR COVID VAC PFIZER DIL RECON 30 MCG/0.3 ML IM: CPT

## 2021-05-07 PROCEDURE — 91300 PR COVID VAC PFIZER DIL RECON 30 MCG/0.3 ML IM: CPT

## 2021-05-25 NOTE — MR AVS SNAPSHOT
After Visit Summary   3/30/2017    Aliyah Askew    MRN: [id number]           Patient Information     Date Of Birth          1965        Visit Information        Provider Department      3/30/2017 9:20 AM Brooklynn Leyva MD [facility]        Today's Diagnoses     Routine general medical examination at a health care facility    -  1    Screening for colon cancer        Major depressive disorder with single episode, remission status unspecified        CARDIOVASCULAR SCREENING; LDL GOAL LESS THAN 160        Exposed to noise        Major depressive disorder, single episode, in full remission (H)          Care Instructions    1. Schedule a colonoscopy.  2. You can schedule with ENT.     Preventive Health Recommendations  Female Ages 50 - 64    Yearly exam: See your health care provider every year in order to  o Review health changes.   o Discuss preventive care.    o Review your medicines if your doctor has prescribed any.      Get a Pap test every three years (unless you have an abnormal result and your provider advises testing more often).    If you get Pap tests with HPV test, you only need to test every 5 years, unless you have an abnormal result.     You do not need a Pap test if your uterus was removed (hysterectomy) and you have not had cancer.    You should be tested each year for STDs (sexually transmitted diseases) if you're at risk.     Have a mammogram every 1 to 2 years.    Have a colonoscopy at age 50, or have a yearly FIT test (stool test). These exams screen for colon cancer.      Have a cholesterol test every 5 years, or more often if advised.    Have a diabetes test (fasting glucose) every three years. If you are at risk for diabetes, you should have this test more often.     If you are at risk for osteoporosis (brittle bone disease), think about having a bone density scan (DEXA).    Shots: Get a flu shot each year. Get a tetanus shot every 10 years.    Nutrition:      Eat at least 5 servings of fruits and vegetables each day.    Eat whole-grain bread, whole-wheat pasta and brown rice instead of white grains and rice.    Talk to your provider about Calcium and Vitamin D.     Lifestyle    Exercise at least 150 minutes a week (30 minutes a day, 5 days a week). This will help you control your weight and prevent disease.    Limit alcohol to one drink per day.    No smoking.     Wear sunscreen to prevent skin cancer.     See your dentist every six months for an exam and cleaning.    See your eye doctor every 1 to 2 years.          Follow-ups after your visit        Additional Services     GASTROENTEROLOGY ADULT REF PROCEDURE ONLY       Last Lab Result: Creatinine (mg/dL)       Date                     Value                 12/17/2014               0.81             ----------  There is no height or weight on file to calculate BMI.     Needed:  No  Language:  English    Patient will be contacted to schedule procedure.     Please be aware that coverage of these services is subject to the terms and limitations of your health insurance plan.  Call member services at your health plan with any benefit or coverage questions.  Any procedures must be performed at a [facility] facility OR coordinated by your clinic's referral office.    Please bring the following with you to your appointment:    (1) Any X-Rays, CTs or MRIs which have been performed.  Contact the facility where they were done to arrange for  prior to your scheduled appointment.    (2) List of current medications   (3) This referral request   (4) Any documents/labs given to you for this referral            OTOLARYNGOLOGY REFERRAL       Your provider has referred you to: [facility]: Adult [facility] (Otolaryngology) - [place] [phone]  [email or web address]  [facility]: [facility] [phone]    [email or web address]  [place] [phone]   [email or web address]  [place] [phone]   [email or web address]  [place] [phone]   [email or web address]  [place] [phone]   [email or web address]/  [place] [phone]   [email or web address]://[email or web address].[email or web address]/[email or web address]/[place]/    Please be aware that coverage of these services is subject to the terms and limitations of your health insurance plan.  Call member services at your health plan with any benefit or coverage questions.      Please bring the following with you to your appointment:    (1) Any X-Rays, CTs or MRIs which have been performed.  Contact the facility where they were done to arrange for  prior to your scheduled appointment.   (2) List of current medications  (3) This referral request   (4) Any documents/labs given to you for this referral                  Who to contact     If you have questions or need follow up information about today's clinic visit or your schedule please contact [facility] directly at [phone].  Normal or non-critical lab and imaging results will be communicated to you by [facility], letter or phone within 4 business days after the clinic has received the results. If you do not hear from us within 7 days, please contact the clinic through [facility] or phone. If you have a critical or abnormal lab result, we will notify you by phone as soon as possible.  Submit refill requests through [employer] or call your pharmacy and they will forward the refill request to us. Please allow 3 business days for your refill to be completed.          Additional Information About Your Visit        [facility] Information     [facility] lets you send messages to your doctor, view your test results, renew your prescriptions, schedule appointments and  "more. To sign up, go to [email or web address] . Click on \"Log in\" on the left side of the screen, which will take you to the Welcome page. Then click on \"Sign up Now\" on the right side of the page.     You will be asked to enter the access code listed below, as well as some personal information. Please follow the directions to create your username and password.     Your access code is: [email or web address]  Expires: 2017 10:12 AM     Your access code will  in 90 days. If you need help or a new code, please call your [facility] clinic or [phone].        Care EveryWhere ID     This is your Care EveryWhere ID. This could be used by other organizations to access your [facility] medical records  [id number]        Your Vitals Were     Pulse Temperature Respirations Height Last Period Pulse Oximetry    50 98.1  F (36.7  C) (Oral) 10 5' 2.75\" (1.594 m) 2016 98%    BMI (Body Mass Index)                   21.78 kg/m2            Blood Pressure from Last 3 Encounters:   17 126/79   16 123/70   10/12/16 145/79    Weight from Last 3 Encounters:   17 122 lb (55.3 kg)   16 133 lb 8 oz (60.6 kg)   10/12/16 136 lb 1.6 oz (61.7 kg)              We Performed the Following     GASTROENTEROLOGY ADULT REF PROCEDURE ONLY     OTOLARYNGOLOGY REFERRAL          Today's Medication Changes          These changes are accurate as of: 3/30/17 10:19 AM.  If you have any questions, ask your nurse or doctor.               Start taking these medicines.        Dose/Directions    escitalopram 20 MG tablet   Commonly known as:  LEXAPRO   Used for:  Major depressive disorder, single episode, in full remission (H)   Started by:  Brooklynn Leyva MD        Dose:  20 mg   Take 1 tablet (20 mg) by mouth daily   Quantity:  90 tablet   Refills:  0            Where to get your medicines      These medications were sent to [facility] [place], [place] - [address]  [address] [facility] [address]     " Phone:  [phone]     escitalopram 20 MG tablet                Primary Care Provider Office Phone # Fax #    Brooklynn Leyva [phone]       [facility] [address]  [facility] [address]        Thank you!     Thank you for choosing [facility]  for your care. Our goal is always to provide you with excellent care. Hearing back from our patients is one way we can continue to improve our services. Please take a few minutes to complete the written survey that you may receive in the mail after your visit with us. Thank you!             Your Updated Medication List - Protect others around you: Learn how to safely use, store and throw away your medicines at www.disposemymeds.org.          This list is accurate as of: 3/30/17 10:19 AM.  Always use your most recent med list.                   Brand Name Dispense Instructions for use    escitalopram 20 MG tablet    LEXAPRO    90 tablet    Take 1 tablet (20 mg) by mouth daily       ibuprofen 600 MG tablet    ADVIL/MOTRIN    30 tablet    Take 1 tablet (600 mg) by mouth every 6 hours as needed for pain (mild)       MULTIVITAMIN ADULT PO          VITAMIN D (CHOLECALCIFEROL) PO      Take by mouth daily          Abdomen soft, non-tender, no guarding.

## 2021-06-10 ENCOUNTER — TRANSFERRED RECORDS (OUTPATIENT)
Dept: HEALTH INFORMATION MANAGEMENT | Facility: CLINIC | Age: 56
End: 2021-06-10

## 2021-07-19 ENCOUNTER — OFFICE VISIT (OUTPATIENT)
Dept: FAMILY MEDICINE | Facility: CLINIC | Age: 56
End: 2021-07-19
Payer: COMMERCIAL

## 2021-07-19 VITALS
OXYGEN SATURATION: 97 % | BODY MASS INDEX: 25.56 KG/M2 | TEMPERATURE: 98.3 F | SYSTOLIC BLOOD PRESSURE: 120 MMHG | HEART RATE: 65 BPM | WEIGHT: 142 LBS | DIASTOLIC BLOOD PRESSURE: 77 MMHG | RESPIRATION RATE: 16 BRPM

## 2021-07-19 DIAGNOSIS — M25.562 CHRONIC PAIN OF LEFT KNEE: ICD-10-CM

## 2021-07-19 DIAGNOSIS — G89.29 CHRONIC PAIN OF LEFT KNEE: ICD-10-CM

## 2021-07-19 DIAGNOSIS — Z01.818 PREOP GENERAL PHYSICAL EXAM: Primary | ICD-10-CM

## 2021-07-19 LAB
ERYTHROCYTE [DISTWIDTH] IN BLOOD BY AUTOMATED COUNT: 11.7 % (ref 10–15)
HCT VFR BLD AUTO: 42.5 % (ref 35–47)
HGB BLD-MCNC: 14.4 G/DL (ref 11.7–15.7)
MCH RBC QN AUTO: 30 PG (ref 26.5–33)
MCHC RBC AUTO-ENTMCNC: 33.9 G/DL (ref 31.5–36.5)
MCV RBC AUTO: 89 FL (ref 78–100)
PLATELET # BLD AUTO: 240 10E3/UL (ref 150–450)
RBC # BLD AUTO: 4.8 10E6/UL (ref 3.8–5.2)
WBC # BLD AUTO: 3.9 10E3/UL (ref 4–11)

## 2021-07-19 PROCEDURE — 36415 COLL VENOUS BLD VENIPUNCTURE: CPT | Performed by: NURSE PRACTITIONER

## 2021-07-19 PROCEDURE — 85027 COMPLETE CBC AUTOMATED: CPT | Performed by: NURSE PRACTITIONER

## 2021-07-19 PROCEDURE — 80048 BASIC METABOLIC PNL TOTAL CA: CPT | Performed by: NURSE PRACTITIONER

## 2021-07-19 PROCEDURE — 99214 OFFICE O/P EST MOD 30 MIN: CPT | Performed by: NURSE PRACTITIONER

## 2021-07-19 NOTE — PATIENT INSTRUCTIONS

## 2021-07-19 NOTE — PROGRESS NOTES
[facility] [address] [place] [address]  Phone: [phone]  Primary Provider: Brooklynn Leyva  Pre-op Performing Provider: HARVEY GUZMAN    PREOPERATIVE EVALUATION:  Today's date: 7/19/2021    Aliyah Askew is a 56 year old female who presents for a preoperative evaluation.    Surgical Information:  Surgery/Procedure:  left knee procedure -arthroscopy for   Surgery Location:  [facility]   Surgeon: Dr. Griggs   Surgery Date: 07/23/2021  Time of Surgery:   Where patient plans to recover: At home with family  Fax number for surgical facility: TBD     Type of Anesthesia Anticipated: to be determined    Assessment & Plan     The proposed surgical procedure is considered INTERMEDIATE risk.    Preop general physical exam    - CBC with platelets; Future  - Basic metabolic panel  (Ca, Cl, CO2, Creat, Gluc, K, Na, BUN); Future  - Asymptomatic COVID-19 Virus (Coronavirus) by PCR Nasopharyngeal; Future  - Basic metabolic panel  (Ca, Cl, CO2, Creat, Gluc, K, Na, BUN)  - CBC with platelets    Chronic pain of left knee    - CBC with platelets; Future  - Basic metabolic panel  (Ca, Cl, CO2, Creat, Gluc, K, Na, BUN); Future  - Asymptomatic COVID-19 Virus (Coronavirus) by PCR Nasopharyngeal; Future  - Basic metabolic panel  (Ca, Cl, CO2, Creat, Gluc, K, Na, BUN)  - CBC with platelets         Risks and Recommendations:  The patient has the following additional risks and recommendations for perioperative complications:   - No identified additional risk factors other than previously addressed    Medication Instructions:  Patient is on no chronic medications    RECOMMENDATION:  APPROVAL GIVEN to proceed with proposed procedure, without further diagnostic evaluation.            Subjective     HPI related to upcoming procedure: LEFT KNEE PAIN  SINCE FALLING IN 12/2018     Preop Questions 7/17/2021   1. Have you ever had a heart attack or stroke? No   2. Have you ever had surgery  on your heart or blood vessels, such as a stent placement, a coronary artery bypass, or surgery on an artery in your head, neck, heart, or legs? No   3. Do you have chest pain with activity? No   4. Do you have a history of  heart failure? No   5. Do you currently have a cold, bronchitis or symptoms of other infection? No   6. Do you have a cough, shortness of breath, or wheezing? No   7. Do you or anyone in your family have previous history of blood clots? No   8. Do you or does anyone in your family have a serious bleeding problem such as prolonged bleeding following surgeries or cuts? No   9. Have you ever had problems with anemia or been told to take iron pills? No   10. Have you had any abnormal blood loss such as black, tarry or bloody stools, or abnormal vaginal bleeding? No   11. Have you ever had a blood transfusion? No   12. Are you willing to have a blood transfusion if it is medically needed before, during, or after your surgery? Yes   13. Have you or any of your relatives ever had problems with anesthesia? No   14. Do you have sleep apnea, excessive snoring or daytime drowsiness? No   15. Do you have any artifical heart valves or other implanted medical devices like a pacemaker, defibrillator, or continuous glucose monitor? No   16. Do you have artificial joints? No   17. Are you allergic to latex? YES: to adhesives   18. Is there any chance that you may be pregnant? No       Health Care Directive:  Patient does not have a Health Care Directive or Living Will: Discussed advance care planning with patient; information given to patient to review.    Preoperative Review of :   reviewed - no record of controlled substances prescribed.      Status of Chronic Conditions:  See problem list for active medical problems.  Problems all longstanding and stable, except as noted/documented.  See ROS for pertinent symptoms related to these conditions.      Review of Systems  Constitutional, neuro, ENT,  endocrine, pulmonary, cardiac, gastrointestinal, genitourinary, musculoskeletal, integument and psychiatric systems are negative, except as otherwise noted.    Patient Active Problem List    Diagnosis Date Noted     WALLACE (generalized anxiety disorder) 05/15/2017     Priority: Medium     Right knee pain 12/23/2016     Priority: Medium     Urinary urgency 10/12/2016     Priority: Medium     Urge incontinence 10/12/2016     Priority: Medium     S/P laparoscopic supracervical hysterectomy 06/30/2016     Priority: Medium     Leukopenia      Priority: Medium     2010 and 2014       Vitamin D deficiency 10/26/2012     Priority: Medium     Major depression 05/26/2011     Priority: Medium     CARDIOVASCULAR SCREENING; LDL GOAL LESS THAN 160 05/09/2010     Priority: Medium      Past Medical History:   Diagnosis Date     Closed fracture of unspecified bone 1990    left fifth metacarpal fracture - cast     Depressive disorder Alway    from birth     Heart murmur      Leukopenia     2010 and 2014     Leukopenia      Major depression      Past Surgical History:   Procedure Laterality Date     ABDOMEN SURGERY  6.15.16    Hysterectomy     BIOPSY  5/9/16     HYSTERECTOMY, SUPRACERVICAL LAPAROSCOPIC  6/15/16     LAPAROSCOPIC HYSTERECTOMY TOTAL, SALPINGECTOMY BILATERAL N/A 6/15/2016    Procedure: LAPAROSCOPIC HYSTERECTOMY TOTAL, SALPINGECTOMY BILATERAL;  Surgeon: Kavita Manley MD;  Location: [facility] OR     LAPAROSCOPIC SALPINGECTOMY Bilateral 6/15/16     Current Outpatient Medications   Medication Sig Dispense Refill     Multiple Vitamins-Minerals (MULTIVITAMIN ADULT PO)          Allergies   Allergen Reactions     Adhesive Tape      Aminoglycosides      Iodine      Hx hives in 2016 after iodine prep vs adhesive tape     Ofloxacin      eye drops        Social History     Tobacco Use     Smoking status: Never Smoker     Smokeless tobacco: Never Used   Substance Use Topics     Alcohol use: Yes     Comment: 5 drinks per week       History    Drug Use No         Objective     /77 (BP Location: Left arm, Patient Position: Sitting, Cuff Size: Adult Regular)   Pulse 65   Temp 98.3  F (36.8  C) (Tympanic)   Resp 16   LMP 06/11/2016   SpO2 97%     Physical Exam    GENERAL APPEARANCE: healthy, alert and no distress     EYES: EOMI, PERRL     HENT: ear canals and TM's normal and nose and mouth without ulcers or lesions     NECK: no adenopathy, no asymmetry, masses, or scars and thyroid normal to palpation     RESP: lungs clear to auscultation - no rales, rhonchi or wheezes     CV: regular rates and rhythm, normal S1 S2, no S3 or S4 and no murmur, click or rub     ABDOMEN:  soft, nontender, no HSM or masses and bowel sounds normal     MS: extremities normal- no gross deformities noted, no evidence of inflammation in joints, FROM in all extremities.     SKIN: no suspicious lesions or rashes     NEURO: Normal strength and tone, sensory exam grossly normal, mentation intact and speech normal     PSYCH: mentation appears normal. and affect normal/bright     LYMPHATICS: No cervical adenopathy    Recent Labs   Lab Test 03/31/21  1619   HGB 14.1         POTASSIUM 4.5   CR 0.87        Diagnostics:  Labs pending at this time.  Results will be reviewed when available.   No EKG required, no history of coronary heart disease, significant arrhythmia, peripheral arterial disease or other structural heart disease.    Revised Cardiac Risk Index (RCRI):  The patient has the following serious cardiovascular risks for perioperative complications:   - No serious cardiac risks = 0 points     RCRI Interpretation: 0 points: Class I (very low risk - 0.4% complication rate)           Signed Electronically by: PEPE Jordan CNP  Copy of this evaluation report is provided to requesting physician.

## 2021-07-20 LAB
ANION GAP SERPL CALCULATED.3IONS-SCNC: 6 MMOL/L (ref 3–14)
BUN SERPL-MCNC: 12 MG/DL (ref 7–30)
CALCIUM SERPL-MCNC: 9.1 MG/DL (ref 8.5–10.1)
CHLORIDE BLD-SCNC: 105 MMOL/L (ref 94–109)
CO2 SERPL-SCNC: 28 MMOL/L (ref 20–32)
CREAT SERPL-MCNC: 0.93 MG/DL (ref 0.52–1.04)
GFR SERPL CREATININE-BSD FRML MDRD: 69 ML/MIN/1.73M2
GLUCOSE BLD-MCNC: 88 MG/DL (ref 70–99)
POTASSIUM BLD-SCNC: 4.1 MMOL/L (ref 3.4–5.3)
SODIUM SERPL-SCNC: 139 MMOL/L (ref 133–144)

## 2021-07-21 ENCOUNTER — LAB (OUTPATIENT)
Dept: URGENT CARE | Facility: URGENT CARE | Age: 56
End: 2021-07-21
Attending: NURSE PRACTITIONER
Payer: COMMERCIAL

## 2021-07-21 DIAGNOSIS — G89.29 CHRONIC PAIN OF LEFT KNEE: ICD-10-CM

## 2021-07-21 DIAGNOSIS — M25.562 CHRONIC PAIN OF LEFT KNEE: ICD-10-CM

## 2021-07-21 DIAGNOSIS — Z01.818 PREOP GENERAL PHYSICAL EXAM: ICD-10-CM

## 2021-07-21 PROCEDURE — U0003 INFECTIOUS AGENT DETECTION BY NUCLEIC ACID (DNA OR RNA); SEVERE ACUTE RESPIRATORY SYNDROME CORONAVIRUS 2 (SARS-COV-2) (CORONAVIRUS DISEASE [COVID-19]), AMPLIFIED PROBE TECHNIQUE, MAKING USE OF HIGH THROUGHPUT TECHNOLOGIES AS DESCRIBED BY CMS-2020-01-R: HCPCS

## 2021-07-21 PROCEDURE — U0005 INFEC AGEN DETEC AMPLI PROBE: HCPCS

## 2021-07-21 PROCEDURE — 99207 PR NO CHARGE LOS: CPT

## 2021-07-22 LAB — SARS-COV-2 RNA RESP QL NAA+PROBE: NEGATIVE

## 2021-07-23 ENCOUNTER — TRANSFERRED RECORDS (OUTPATIENT)
Dept: HEALTH INFORMATION MANAGEMENT | Facility: CLINIC | Age: 56
End: 2021-07-23
Payer: COMMERCIAL

## 2021-10-06 ENCOUNTER — TRANSFERRED RECORDS (OUTPATIENT)
Dept: HEALTH INFORMATION MANAGEMENT | Facility: CLINIC | Age: 56
End: 2021-10-06

## 2021-10-24 ENCOUNTER — HEALTH MAINTENANCE LETTER (OUTPATIENT)
Age: 56
End: 2021-10-24

## 2021-12-01 NOTE — TELEPHONE ENCOUNTER
"Dr. Leyva-Please review and advise.    Patient called and spoke with writer.    Per patient:  1. Concerned about cholesterol results  2. Leads a very active lifestyle but eats a \"prison decent diet\"  3. Mother has elevated cholesterol  4. Wondering if there are any studies that show correlation between hormone changes in menopausal women and cholesterol results?   A. Did a Google search and found information that linked hormone changes to abnormal cholesterol results    Writer explained to patient how cardiovascular risk is calculated and based on that risk, why provider may recommend lifestyle/dietary changes over starting medication to help lower cholesterol.    Writer is not familiar with any studies that examine link between cholesterol results and menopausal hormone changes.    Patient verbalized understanding and in agreement with plan.  TOMY Cristina, RN    " Anticoagulation Progress Note    Indication: DVT/PE  Goal INR: 2 - 3  INR Result: 2.6    50 year old female returns to the [facility] for a follow-up visit after 2 wks.    Had recent admission 11/10-11/12 s/p vaginal bleeding. Hgb 6.9. INR 2.5 in ER on 11/10, attempted FFP and RBC, but pt had reaction, then INR 2.3 and Hgb 6.3.      Gyne saw pt. Provera started for 10 days. Pt was supposed to f/u with gyne to discuss IUD vs. Hysterectomy. States she never made appt, started bleeding vaginally again a couple days ago, not as much as first time leading to hospital admission.  Changes a pad ~4 times a day. Denies lightheadedness, dizziness, SOB.  Emphasized importance of making fu appt with gyne ASAP due to her recurrent vaginal bleeding issue.      Paged Dr. Page to inform of above info, if wants to refill provera for patient due to recurrent bleeding. MD will refill provera - pt will  rx.  Pt also informed needs updated iron labs for iron infusions and to fu with Dr. Page office regarding this, also reminded again to call obgyne office today to make appt.  If develops worsening/severe bleeding, dizziness/lightheaded SOB instructed to go to ER.     Assessment  (+) bleeding, bruising or thromboembolic complications  (-) missed/extra warfarin doses  (-) medication changes  (-) dietary changes  (-) alcohol changes  (-) smoking changes  (-) activity level changes  (-) fluid status changes  (-) hospital visits, ER visits, interruptions in therapy  (-) illness/infection, injuries, or falls    Plan   -Pt's INR=2.6 today, which is within the desired therapeutic range of 2 - 3.   -Pt's INR has increased from 2 at the last visit.   -Pt has been taking warfarin 42.5 mg/wk.  -Plan to have patient continue her usual dosing of 42.5mg/week.   -Pt to return in 3 wks for f/u INR 12/22/21.    Ana Selby, PharmD  [facility]  Anticoagulation Clinic  Ph [phone]

## 2022-01-04 ENCOUNTER — TRANSFERRED RECORDS (OUTPATIENT)
Dept: HEALTH INFORMATION MANAGEMENT | Facility: CLINIC | Age: 57
End: 2022-01-04
Payer: COMMERCIAL

## 2022-02-13 ENCOUNTER — HEALTH MAINTENANCE LETTER (OUTPATIENT)
Age: 57
End: 2022-02-13

## 2022-02-15 ENCOUNTER — TRANSFERRED RECORDS (OUTPATIENT)
Dept: HEALTH INFORMATION MANAGEMENT | Facility: CLINIC | Age: 57
End: 2022-02-15
Payer: COMMERCIAL

## 2022-02-24 NOTE — PATIENT INSTRUCTIONS
Preventive Health Recommendations  Female Ages 50 - 64    Yearly exam: See your health care provider every year in order to  o Review health changes.   o Discuss preventive care.    o Review your medicines if your doctor has prescribed any.      Get a Pap test every three years (unless you have an abnormal result and your provider advises testing more often).    If you get Pap tests with HPV test, you only need to test every 5 years, unless you have an abnormal result.     You do not need a Pap test if your uterus was removed (hysterectomy) and you have not had cancer.    You should be tested each year for STDs (sexually transmitted diseases) if you're at risk.     Have a mammogram every 1 to 2 years.    Have a colonoscopy at age 50, or have a yearly FIT test (stool test). These exams screen for colon cancer.      Have a cholesterol test every 5 years, or more often if advised.    Have a diabetes test (fasting glucose) every three years. If you are at risk for diabetes, you should have this test more often.     If you are at risk for osteoporosis (brittle bone disease), think about having a bone density scan (DEXA).    Shots: Get a flu shot each year. Get a tetanus shot every 10 years.    Nutrition:     Eat at least 5 servings of fruits and vegetables each day.    Eat whole-grain bread, whole-wheat pasta and brown rice instead of white grains and rice.    Get adequate Calcium and Vitamin D.     Lifestyle    Exercise at least 150 minutes a week (30 minutes a day, 5 days a week). This will help you control your weight and prevent disease.    Limit alcohol to one drink per day.    No smoking.     Wear sunscreen to prevent skin cancer.     See your dentist every six months for an exam and cleaning.    See your eye doctor every 1 to 2 years.  You need a colonoscopy:  This is a lifesaving screening test - please call to set up an appointment today.  [phone] ([place])  or   [phone] ([facility] [address])    I recommend getting the new shingles vaccine, Shingrix.  You can call your insurance company to find out if this vaccine is covered.  You may also ask our pharmacy to check if your insurance covers Shingrix if given at the pharmacy.      Acute cholecystitis

## 2022-03-29 ENCOUNTER — TRANSFERRED RECORDS (OUTPATIENT)
Dept: HEALTH INFORMATION MANAGEMENT | Facility: CLINIC | Age: 57
End: 2022-03-29
Payer: COMMERCIAL

## 2022-07-31 ENCOUNTER — HEALTH MAINTENANCE LETTER (OUTPATIENT)
Age: 57
End: 2022-07-31

## 2022-10-15 ENCOUNTER — HEALTH MAINTENANCE LETTER (OUTPATIENT)
Age: 57
End: 2022-10-15

## 2022-11-11 ENCOUNTER — ANCILLARY PROCEDURE (OUTPATIENT)
Dept: MAMMOGRAPHY | Facility: CLINIC | Age: 57
End: 2022-11-11
Attending: FAMILY MEDICINE
Payer: COMMERCIAL

## 2022-11-11 DIAGNOSIS — Z12.31 VISIT FOR SCREENING MAMMOGRAM: ICD-10-CM

## 2022-11-11 PROCEDURE — 77067 SCR MAMMO BI INCL CAD: CPT | Mod: GC | Performed by: RADIOLOGY

## 2022-11-11 PROCEDURE — 77063 BREAST TOMOSYNTHESIS BI: CPT | Mod: GC | Performed by: RADIOLOGY

## 2022-11-18 ENCOUNTER — ANCILLARY PROCEDURE (OUTPATIENT)
Dept: MAMMOGRAPHY | Facility: CLINIC | Age: 57
End: 2022-11-18
Attending: FAMILY MEDICINE
Payer: COMMERCIAL

## 2022-11-18 DIAGNOSIS — R92.8 ABNORMAL MAMMOGRAM OF RIGHT BREAST: ICD-10-CM

## 2022-11-18 PROCEDURE — 77065 DX MAMMO INCL CAD UNI: CPT | Mod: RT | Performed by: RADIOLOGY

## 2022-11-18 PROCEDURE — 76642 ULTRASOUND BREAST LIMITED: CPT | Mod: RT | Performed by: RADIOLOGY

## 2022-12-02 ENCOUNTER — ANCILLARY PROCEDURE (OUTPATIENT)
Dept: MAMMOGRAPHY | Facility: CLINIC | Age: 57
End: 2022-12-02
Attending: FAMILY MEDICINE
Payer: COMMERCIAL

## 2022-12-02 DIAGNOSIS — R92.8 ABNORMAL FINDING ON BREAST IMAGING: ICD-10-CM

## 2022-12-02 PROCEDURE — 88305 TISSUE EXAM BY PATHOLOGIST: CPT | Mod: 26 | Performed by: PATHOLOGY

## 2022-12-02 PROCEDURE — 88305 TISSUE EXAM BY PATHOLOGIST: CPT | Mod: TC | Performed by: FAMILY MEDICINE

## 2022-12-02 PROCEDURE — 19081 BX BREAST 1ST LESION STRTCTC: CPT | Mod: RT | Performed by: RADIOLOGY

## 2022-12-02 RX ORDER — LIDOCAINE HYDROCHLORIDE AND EPINEPHRINE 10; 10 MG/ML; UG/ML
20 INJECTION, SOLUTION INFILTRATION; PERINEURAL ONCE
Status: COMPLETED | OUTPATIENT
Start: 2022-12-02 | End: 2022-12-02

## 2022-12-02 RX ORDER — LIDOCAINE HYDROCHLORIDE 10 MG/ML
10 INJECTION, SOLUTION EPIDURAL; INFILTRATION; INTRACAUDAL; PERINEURAL ONCE
Status: COMPLETED | OUTPATIENT
Start: 2022-12-02 | End: 2022-12-02

## 2022-12-02 RX ADMIN — LIDOCAINE HYDROCHLORIDE AND EPINEPHRINE 20 ML: 10; 10 INJECTION, SOLUTION INFILTRATION; PERINEURAL at 13:39

## 2022-12-02 RX ADMIN — LIDOCAINE HYDROCHLORIDE 10 ML: 10 INJECTION, SOLUTION EPIDURAL; INFILTRATION; INTRACAUDAL; PERINEURAL at 13:39

## 2022-12-06 ENCOUNTER — TELEPHONE (OUTPATIENT)
Dept: MAMMOGRAPHY | Facility: CLINIC | Age: 57
End: 2022-12-06

## 2022-12-06 LAB
PATH REPORT.COMMENTS IMP SPEC: NORMAL
PATH REPORT.COMMENTS IMP SPEC: NORMAL
PATH REPORT.FINAL DX SPEC: NORMAL
PATH REPORT.GROSS SPEC: NORMAL
PATH REPORT.MICROSCOPIC SPEC OTHER STN: NORMAL
PATH REPORT.RELEVANT HX SPEC: NORMAL
PHOTO IMAGE: NORMAL

## 2022-12-06 NOTE — TELEPHONE ENCOUNTER
Spoke to Bárbara about the benign finding from her breast biopsy.  We discussed the Radiologist's recommendation of continuing on with her yearly screening mammogram.  Bárbara verbalized understanding and all questions and concerns were answered at this time.

## 2023-03-26 ENCOUNTER — HEALTH MAINTENANCE LETTER (OUTPATIENT)
Age: 58
End: 2023-03-26

## 2023-05-22 ENCOUNTER — TELEPHONE (OUTPATIENT)
Dept: FAMILY MEDICINE | Facility: CLINIC | Age: 58
End: 2023-05-22

## 2023-05-22 NOTE — TELEPHONE ENCOUNTER
Reason for Call:  Appointment Request    Patient requesting this type of appt:  Preventive     Requested provider: Brooklynn Leyva    Reason patient unable to be scheduled: Not within requested timeframe    When does patient want to be seen/preferred time: A month or so    Comments: Patient ran into her provider outside of clinic setting and chatted about working her in for an annual physical since Dr Leyva books out several months. Patient has a virtual appointment scheduled for 5/25/23 to discuss some other concerns and medications but she was told to contact Dr Leyva via [facility] to see where she could be fit in for a physical. Please advise.     Could we send this information to you in [place] or would you prefer to receive a phone call?:   Patient would prefer a phone call   Okay to leave a detailed message?: Yes at Cell number on file:    Telephone Information:   Mobile [phone]       Call taken on 5/22/2023 at 11:26 AM by Arnold Vallejo

## 2023-05-25 ENCOUNTER — VIRTUAL VISIT (OUTPATIENT)
Dept: FAMILY MEDICINE | Facility: CLINIC | Age: 58
End: 2023-05-25
Payer: COMMERCIAL

## 2023-05-25 DIAGNOSIS — F41.1 GAD (GENERALIZED ANXIETY DISORDER): ICD-10-CM

## 2023-05-25 DIAGNOSIS — F33.0 MILD EPISODE OF RECURRENT MAJOR DEPRESSIVE DISORDER (H): ICD-10-CM

## 2023-05-25 DIAGNOSIS — R39.15 URINARY URGENCY: ICD-10-CM

## 2023-05-25 DIAGNOSIS — N95.1 MENOPAUSAL SYNDROME (HOT FLASHES): Primary | ICD-10-CM

## 2023-05-25 PROCEDURE — 99443 PR PHYSICIAN TELEPHONE EVALUATION 21-30 MIN: CPT | Mod: 93 | Performed by: FAMILY MEDICINE

## 2023-05-25 RX ORDER — ESTRADIOL 0.03 MG/D
1 FILM, EXTENDED RELEASE TRANSDERMAL
Qty: 8 PATCH | Refills: 1 | Status: SHIPPED | OUTPATIENT
Start: 2023-05-25 | End: 2023-09-28 | Stop reason: DRUGHIGH

## 2023-05-25 RX ORDER — ESCITALOPRAM OXALATE 10 MG/1
10 TABLET ORAL DAILY
Qty: 90 TABLET | Refills: 1 | Status: SHIPPED | OUTPATIENT
Start: 2023-05-25 | End: 2023-09-28

## 2023-05-25 RX ORDER — ESTRADIOL 10 UG/1
10 INSERT VAGINAL DAILY
Qty: 18 TABLET | Refills: 0 | Status: SHIPPED | OUTPATIENT
Start: 2023-05-25 | End: 2023-11-09

## 2023-05-25 RX ORDER — ESTRADIOL 10 UG/1
10 INSERT VAGINAL
Qty: 24 TABLET | Refills: 3 | Status: SHIPPED | OUTPATIENT
Start: 2023-05-25 | End: 2023-11-09

## 2023-05-25 ASSESSMENT — ANXIETY QUESTIONNAIRES
4. TROUBLE RELAXING: SEVERAL DAYS
3. WORRYING TOO MUCH ABOUT DIFFERENT THINGS: NEARLY EVERY DAY
6. BECOMING EASILY ANNOYED OR IRRITABLE: NEARLY EVERY DAY
7. FEELING AFRAID AS IF SOMETHING AWFUL MIGHT HAPPEN: NOT AT ALL
7. FEELING AFRAID AS IF SOMETHING AWFUL MIGHT HAPPEN: NOT AT ALL
2. NOT BEING ABLE TO STOP OR CONTROL WORRYING: NEARLY EVERY DAY
IF YOU CHECKED OFF ANY PROBLEMS ON THIS QUESTIONNAIRE, HOW DIFFICULT HAVE THESE PROBLEMS MADE IT FOR YOU TO DO YOUR WORK, TAKE CARE OF THINGS AT HOME, OR GET ALONG WITH OTHER PEOPLE: VERY DIFFICULT
8. IF YOU CHECKED OFF ANY PROBLEMS, HOW DIFFICULT HAVE THESE MADE IT FOR YOU TO DO YOUR WORK, TAKE CARE OF THINGS AT HOME, OR GET ALONG WITH OTHER PEOPLE?: VERY DIFFICULT
GAD7 TOTAL SCORE: 13
GAD7 TOTAL SCORE: 13
1. FEELING NERVOUS, ANXIOUS, OR ON EDGE: NEARLY EVERY DAY
GAD7 TOTAL SCORE: 13
5. BEING SO RESTLESS THAT IT IS HARD TO SIT STILL: NOT AT ALL

## 2023-05-25 NOTE — PATIENT INSTRUCTIONS
Start lexapro 10mg daily. You may try taking a half tablet daily for 1-2 weeks before increasing to a full tablet daily.   Start estradiol vaginal tablet inserted daily for two weeks then reduce frequency to twice weekly for maintenance.   Start estradiol patch 0.25mg twice weekly to help with hot flashes.   Follow up with me in a month. We can adjust the lexapro and the estradiol dose depending on how you are doing.   If you are interested in more information about menopause and perimenopause and like to listen to podcasts, check out Dr. Mcneal's Inside Information: The Menopause Podcast.   The North American Menopause Society (NAMS) website is a great resource for information about perimenopause and menopause.

## 2023-05-25 NOTE — PROGRESS NOTES
Bárbara is a 57 year old who is being evaluated via a billable telephone visit.      What phone number would you like to be contacted at? [phone]  How would you like to obtain your AVS? Matt    Distant Location (provider location):  Off-site    Assessment & Plan      WALLACE (generalized anxiety disorder)  Uncontrolled. Worse since her Mom passed away from cancer this winter. She would like to restart Lexapro, which has been effective for her in the past.  She will follow-up with me in about a month.  - escitalopram (LEXAPRO) 10 MG tablet; Take 1 tablet (10 mg) by mouth daily  Dispense: 90 tablet; Refill: 1     Mild episode of recurrent major depressive disorder (H)  Uncontrolled.  Grieving.  See notes above.  - escitalopram (LEXAPRO) 10 MG tablet; Take 1 tablet (10 mg) by mouth daily  Dispense: 90 tablet; Refill: 1     Menopausal syndrome (hot flashes)  Daily hot flashes, night sweats, insomnia. Discussed risks/side effects/benefits of HT. Will start low dose estradiol patch 0.025mg twice weekly and follow up in a month and increase dose if needed at that time. Progestogen not needed as she has had a hysterectomy in 2016.  Gave resources and patient instructions as well    - estradiol (VIVELLE-DOT) 0.025 MG/24HR bi-weekly patch; Place 1 patch onto the skin twice a week  Dispense: 8 patch; Refill: 1    Urinary urgency    Possibly GSM  Will start vaginal estrogen. See below. She will follow up with me in about a month. Consider return to pelvic floor therapy.    - estradiol (VAGIFEM) 10 MCG TABS vaginal tablet; Place 1 tablet (10 mcg) vaginally twice a week  Dispense: 24 tablet; Refill: 3  - estradiol (VAGIFEM) 10 MCG TABS vaginal tablet; Place 1 tablet (10 mcg) vaginally daily  Dispense: 18 tablet; Refill: 0        70 minutes spent by me on the date of the encounter doing chart review, history and exam, documentation and further activities per the note       Patient Instructions   1. Start lexapro 10mg daily. You  may try taking a half tablet daily for 1-2 weeks before increasing to a full tablet daily.   2. Start estradiol vaginal tablet inserted daily for two weeks then reduce frequency to twice weekly for maintenance.   3. Start estradiol patch 0.25mg twice weekly to help with hot flashes.   4. Follow up with me in a month. We can adjust the lexapro and the estradiol dose depending on how you are doing.   5. If you are interested in more information about menopause and perimenopause and like to listen to podcasts, check out Dr. Mcneal's Inside Information: The Menopause Podcast.   6. The North American Menopause Society (NAMS) website is a great resource for information about perimenopause and menopause.        Brooklynn Leyva MD   [facility]    Shani Granger is a 57 year old, presenting for the following health issues:  Depression and Would like to create a more comprehensive POC (Schedule future appointments)         View : No data to display.              History of Present Illness       Reason for visit:  To speak to my doctor    She eats 2-3 servings of fruits and vegetables daily.She consumes 0 sweetened beverage(s) daily.She exercises with enough effort to increase her heart rate 30 to 60 minutes per day.  She exercises with enough effort to increase her heart rate 5 days per week.   She is taking medications regularly.     She has a few issues. Having depression and grief from her mom passing away this winter. Struggles every day. Goes on lexapro for situations in the past. Interested in talking with her sister in getting on lexapro again. It has worked well for her in the past.     In the summer, loves summer, but notes that heat and her body don't work well together. Gets anxiety when the summer heat is coming. Amps up in the lead up to the summer.     Her quality of life is really affected by menopause. Thought she would be on the way out by now but is not. When her mom was  diagnosed with cancer, her diet really tanked. She also notes she has really poor sleep quality. Has these things going on every day.     Has hot flashes, night sweats, lack of sleep. Poor quality sleep. Does not feel like she has a whole lot of time at night that she is in a deep sleep. This creates a lack of energy and that also contributes to anxiety and depression. This amps up bad eating habits.   Always drinks coffee in the morning. Gets more hot flashes.     Had hysterectomy in 2016. Had a couple of years building up to that where she was having problems managing urinary urgency. Fibroid was on the bladder. Thought that was why and that she would be better, but now everything she does is based on her bladder symptoms. Has to wear a pad all of the time. Close to incontinence a couple of time a day. Can sit, ride her bike and can be fine. Goes all night without urinating. The minute she wakes up and gets up  Better get right to the bathroom.    She did go to PT and had a great experience. Was told that her internal muscles were too short/tight naturally. At the time she was a runner for a long time. They did several sessions. She thinks it helped some.  Stopped because the year ended and she would have paid out of pocket.     She had seen urology in 2016 and that was not a helpful visit.            12/18/2019     2:05 PM 5/25/2023    10:13 AM 5/25/2023    10:33 AM   [facility] Follow-up to PHQ   PHQ-9 9. Suicide Ideation past 2 weeks Not at all Not at all Not at all          10/30/2019     9:26 AM 12/18/2019     2:05 PM 5/25/2023    10:23 AM   WALLACE-7 SCORE   Total Score 5 (mild anxiety) 3 (minimal anxiety) 13 (moderate anxiety)   Total Score 5    5 3 13         12/18/2019     2:05 PM 5/25/2023    10:13 AM 5/25/2023    10:33 AM   PHQ   PHQ-9 Total Score 7 11 9   Q9: Thoughts of better off dead/self-harm past 2 weeks Not at all Not at all Not at all          Review of Systems          Objective           Vitals:  No  vitals were obtained today due to virtual visit.    Physical Exam   healthy, alert and no distress  PSYCH: Alert and oriented times 3; coherent speech, normal   rate and volume, able to articulate logical thoughts, able   to abstract reason, no tangential thoughts, no hallucinations   or delusions  Her affect is normal  RESP: No cough, no audible wheezing, able to talk in full sentences  Remainder of exam unable to be completed due to telephone visits                  Phone call duration: 72 minutes

## 2023-06-01 ENCOUNTER — MYC MEDICAL ADVICE (OUTPATIENT)
Dept: FAMILY MEDICINE | Facility: CLINIC | Age: 58
End: 2023-06-01
Payer: COMMERCIAL

## 2023-06-05 NOTE — TELEPHONE ENCOUNTER
She does not need to be rescheduled. I am in clinic and have an opening that day at 11AM for a preventive visit. I am not sure where this is coming from. Brooklynn Leyva M.D.

## 2023-06-05 NOTE — TELEPHONE ENCOUNTER
Patient called back to reschedule preventative due to provider not being in office on 7/11. The dates that was in the appt notes do not work for patient since they work Mondays at 2pm, Wednesdays at 11am, and all day Fridays. Wondering if there are other options for patient to get rescheduled.    Also has concerns on medications that were prescribed for post menopause. Okay with patches but is concerned about the tablets they received because pharmacist states it is for menopause and dryness but is really having issues with incontinence. Wondering if the tablet would help with incontinence.

## 2023-06-05 NOTE — TELEPHONE ENCOUNTER
RN - please let Bárbara know that vaginal estrogen can be helpful for urinary incontinence as well. Brooklynn Leyva M.D.

## 2023-06-06 NOTE — TELEPHONE ENCOUNTER
Writer responded via [employer].      LAI CristinaN, RN-BC  [facility]       EKG/Labs

## 2023-07-10 ASSESSMENT — ENCOUNTER SYMPTOMS
HEMATOCHEZIA: 0
PARESTHESIAS: 0
FEVER: 0
NERVOUS/ANXIOUS: 1
CONSTIPATION: 0
SORE THROAT: 0
COUGH: 0
WEAKNESS: 0
SHORTNESS OF BREATH: 0
HEARTBURN: 0
CHILLS: 0
PALPITATIONS: 0
NAUSEA: 0
ARTHRALGIAS: 1
DIZZINESS: 0
JOINT SWELLING: 0
DYSURIA: 0
FREQUENCY: 1
MYALGIAS: 1
DIARRHEA: 0
EYE PAIN: 0
BREAST MASS: 0
HEMATURIA: 0
ABDOMINAL PAIN: 0
HEADACHES: 0

## 2023-07-11 ENCOUNTER — OFFICE VISIT (OUTPATIENT)
Dept: FAMILY MEDICINE | Facility: CLINIC | Age: 58
End: 2023-07-11
Payer: COMMERCIAL

## 2023-07-11 VITALS
RESPIRATION RATE: 18 BRPM | SYSTOLIC BLOOD PRESSURE: 146 MMHG | OXYGEN SATURATION: 98 % | DIASTOLIC BLOOD PRESSURE: 88 MMHG | BODY MASS INDEX: 26.14 KG/M2 | TEMPERATURE: 98.6 F | HEIGHT: 62 IN | HEART RATE: 70 BPM | WEIGHT: 142.05 LBS

## 2023-07-11 DIAGNOSIS — F33.0 MILD EPISODE OF RECURRENT MAJOR DEPRESSIVE DISORDER (H): ICD-10-CM

## 2023-07-11 DIAGNOSIS — N39.41 URGE INCONTINENCE: ICD-10-CM

## 2023-07-11 DIAGNOSIS — Z00.00 ROUTINE GENERAL MEDICAL EXAMINATION AT A HEALTH CARE FACILITY: Primary | ICD-10-CM

## 2023-07-11 DIAGNOSIS — F41.1 GAD (GENERALIZED ANXIETY DISORDER): ICD-10-CM

## 2023-07-11 DIAGNOSIS — Z12.11 SCREEN FOR COLON CANCER: ICD-10-CM

## 2023-07-11 DIAGNOSIS — N95.1 MENOPAUSAL SYNDROME (HOT FLASHES): ICD-10-CM

## 2023-07-11 PROCEDURE — 99214 OFFICE O/P EST MOD 30 MIN: CPT | Mod: 25 | Performed by: FAMILY MEDICINE

## 2023-07-11 PROCEDURE — 99396 PREV VISIT EST AGE 40-64: CPT | Performed by: FAMILY MEDICINE

## 2023-07-11 ASSESSMENT — ENCOUNTER SYMPTOMS
SHORTNESS OF BREATH: 0
ARTHRALGIAS: 1
EYE PAIN: 0
WEAKNESS: 0
SORE THROAT: 0
CHILLS: 0
HEMATURIA: 0
DIZZINESS: 0
MYALGIAS: 1
DIARRHEA: 0
NERVOUS/ANXIOUS: 1
COUGH: 0
PALPITATIONS: 0
DYSURIA: 0
HEARTBURN: 0
HEMATOCHEZIA: 0
NAUSEA: 0
PARESTHESIAS: 0
ABDOMINAL PAIN: 0
FEVER: 0
HEADACHES: 0
FREQUENCY: 1
JOINT SWELLING: 0
BREAST MASS: 0
CONSTIPATION: 0

## 2023-07-11 ASSESSMENT — PATIENT HEALTH QUESTIONNAIRE - PHQ9: SUM OF ALL RESPONSES TO PHQ QUESTIONS 1-9: 7

## 2023-07-11 ASSESSMENT — PAIN SCALES - GENERAL: PAINLEVEL: NO PAIN (0)

## 2023-07-11 NOTE — PATIENT INSTRUCTIONS
Start the vagifem tablet- insert vaginally once daily for two weeks, then twice weekly for maintenance.   Start the estrogen patch and change it twice weekly about 3.5 days apart, such as Monday evening and Friday morning.

## 2023-07-11 NOTE — PROGRESS NOTES
SUBJECTIVE:   CC: Bárbara is an 58 year old who presents for preventive health visit.       7/11/2023    10:59 AM   Additional Questions   Roomed by Kaycee     Healthy Habits:     Getting at least 3 servings of Calcium per day:  Yes    Bi-annual eye exam:  Yes    Dental care twice a year:  Yes    Sleep apnea or symptoms of sleep apnea:  Daytime drowsiness    Diet:  Regular (no restrictions)    Frequency of exercise:  4-5 days/week    Duration of exercise:  Greater than 60 minutes    Taking medications regularly:  Yes    Medication side effects:  None    Additional concerns today:  No    Did not end up using any of the estrogen thearpy, patch or vaginal     Social History     Tobacco Use     Smoking status: Never     Passive exposure: Never     Smokeless tobacco: Never   Substance Use Topics     Alcohol use: Yes     Comment: 5 drinks per week             7/10/2023    12:43 PM   Alcohol Use   Prescreen: >3 drinks/day or >7 drinks/week? Yes     Reviewed orders with patient.  Reviewed health maintenance and updated orders accordingly - Yes       Breast Cancer Screening:    FHS-7:       11/11/2022     1:53 PM 7/10/2023    12:44 PM   Breast CA Risk Assessment (FHS-7)   Did any of your first-degree relatives have breast or ovarian cancer? Unknown Yes   Did any of your relatives have bilateral breast cancer? No No   Did any man in your family have breast cancer? No No   Did any woman in your family have breast and ovarian cancer? No Yes   Did any woman in your family have breast cancer before age 50 y? No No   Do you have 2 or more relatives with breast and/or ovarian cancer? No No   Do you have 2 or more relatives with breast and/or bowel cancer? No No         Pertinent mammograms are reviewed under the imaging tab.    History of abnormal Pap smear: NO - age 30-65 PAP every 5 years with negative HPV co-testing recommended      Latest Ref Rng & Units 3/28/2019     3:53 PM 3/28/2019     3:08 PM 3/16/2016    12:00 AM   PAP /  HPV   PAP (Historical)   NIL  NIL    HPV 16 DNA NEG^Negative Negative      HPV 18 DNA NEG^Negative Negative      Other HR HPV NEG^Negative Negative        Reviewed and updated as needed this visit by clinical staff   Tobacco  Allergies  Meds              Reviewed and updated as needed this visit by Provider                 Past Medical History:   Diagnosis Date     Arthritis Inside of left knee near my meniscus injury     Closed fracture of unspecified bone     left fifth metacarpal fracture - cast     Depressive disorder Alway    from birth     Heart murmur      Leukopenia      and      Leukopenia      Major depression       Past Surgical History:   Procedure Laterality Date     ABDOMEN SURGERY  6.15.16    Hysterectomy     BIOPSY  16     HYSTERECTOMY, SUPRACERVICAL LAPAROSCOPIC  6/15/16     LAPAROSCOPIC HYSTERECTOMY TOTAL, SALPINGECTOMY BILATERAL N/A 6/15/2016    Procedure: LAPAROSCOPIC HYSTERECTOMY TOTAL, SALPINGECTOMY BILATERAL;  Surgeon: Kavita Manley MD;  Location: [facility] OR     LAPAROSCOPIC SALPINGECTOMY Bilateral 6/15/16     OB History    Para Term  AB Living   0 0 0 0 0 0   SAB IAB Ectopic Multiple Live Births   0 0 0 0 0       Review of Systems   Constitutional: Negative for chills and fever.   HENT: Negative for congestion, ear pain, hearing loss and sore throat.    Eyes: Negative for pain and visual disturbance.   Respiratory: Negative for cough and shortness of breath.    Cardiovascular: Negative for chest pain, palpitations and peripheral edema.   Gastrointestinal: Negative for abdominal pain, constipation, diarrhea, heartburn, hematochezia and nausea.   Breasts:  Negative for tenderness, breast mass and discharge.   Genitourinary: Positive for frequency and urgency. Negative for dysuria, genital sores, hematuria, pelvic pain, vaginal bleeding and vaginal discharge.   Musculoskeletal: Positive for arthralgias and myalgias. Negative for joint swelling.   Skin: Negative  "for rash.   Neurological: Negative for dizziness, weakness, headaches and paresthesias.   Psychiatric/Behavioral: Negative for mood changes. The patient is nervous/anxious.            OBJECTIVE:   /74 (BP Location: Right arm, Patient Position: Sitting, Cuff Size: Adult Regular)   Pulse 70   Temp 98.6  F (37  C) (Temporal)   Resp 18   Ht 1.575 m (5' 2\")   Wt 64.4 kg (142 lb 0.8 oz)   LMP 06/11/2016   SpO2 98%   BMI 25.98 kg/m     Wt Readings from Last 5 Encounters:   07/11/23 64.4 kg (142 lb 0.8 oz)   07/19/21 64.4 kg (142 lb)   09/12/19 59 kg (130 lb)   08/29/19 59.2 kg (130 lb 8 oz)   04/25/19 65 kg (143 lb 4 oz)      BP Readings from Last 6 Encounters:   07/11/23 135/74   07/19/21 120/77   03/31/21 133/77   09/12/19 112/58   08/29/19 112/68   04/25/19 113/72     Physical Exam  GENERAL APPEARANCE: healthy, alert and no distress  EYES: Eyes grossly normal to inspection, PERRL and conjunctivae and sclerae normal  HENT: ear canals and TM's normal, nose and mouth without ulcers or lesions, oropharynx clear and oral mucous membranes moist  NECK: no adenopathy, no asymmetry, masses, or scars and thyroid normal to palpation  RESP: lungs clear to auscultation - no rales, rhonchi or wheezes  BREAST: normal without masses, tenderness or nipple discharge and no palpable axillary masses or adenopathy  CV: regular rate and rhythm, normal S1 S2, no S3 or S4, no murmur, click or rub, no peripheral edema and peripheral pulses strong  ABDOMEN: soft, nontender, no hepatosplenomegaly, no masses and bowel sounds normal  MS: no musculoskeletal defects are noted and gait is age appropriate without ataxia  SKIN: no suspicious lesions or rashes  NEURO: Normal strength and tone, sensory exam grossly normal, mentation intact and speech normal  PSYCH: mentation appears normal and affect normal/bright    Diagnostic Test Results:  Labs reviewed in Epic    ASSESSMENT/PLAN:       ICD-10-CM    1. Routine general medical examination " at a health care facility  Z00.00       2. Screen for colon cancer  Z12.11       3. Need for hepatitis C screening test  Z11.59         Routine preventive visit  Healthy   Colon cancer screening due - she will schedule colonoscopy   Mammogram completed 11/18/22 and she had a biopsy on 12/2/22 negative for malignancy, repeat mammo in 12/23.   Pap due next year. She is s/p supracervical hysterectomy bilateral salpingectomy in 2016  Immunizations: tdap, hep b and shingrix recommended . She declined vaccines today.       WALLACE (generalized anxiety disorder)  Improved on lexapro. Will continue lexapro 10mg daily     Uncontrolled. Worse since her Mom passed away from cancer this winter. She would like to restart Lexapro, which has been effective for her in the past.  She will follow-up with me in about a month.  - escitalopram (LEXAPRO) 10 MG tablet; Take 1 tablet (10 mg) by mouth daily  Dispense: 90 tablet; Refill: 1      Mild episode of recurrent major depressive disorder (H)  Improving on lexapro 10mg daily. She would like to remain at current dose.     Uncontrolled.  Grieving.  See notes above.  - escitalopram (LEXAPRO) 10 MG tablet; Take 1 tablet (10 mg) by mouth daily  Dispense: 90 tablet; Refill: 1     Menopausal syndrome (hot flashes)  Daily hot flashes, night sweats, insomnia. Has estrogen patches, but has not started yet. Discussed again today and she would like to try them. She will let me know if any concerns or if symptoms not controlled on low dose and I can adjust dose.     Discussed risks/side effects/benefits of HT. Will start low dose estradiol patch 0.025mg twice weekly and follow up in a month and increase dose if needed at that time. Progestogen not needed as she has had a hysterectomy in 2016.  Gave resources and patient instructions as well     - estradiol (VIVELLE-DOT) 0.025 MG/24HR bi-weekly patch; Place 1 patch onto the skin twice a week  Dispense: 8 patch; Refill: 1     Urinary  urgency     Possibly GSM  Will start vaginal estrogen- she has not started yet. Reviewed instructions today. See below.   Consider return to pelvic floor therapy.     - estradiol (VAGIFEM) 10 MCG TABS vaginal tablet; Place 1 tablet (10 mcg) vaginally twice a week  Dispense: 24 tablet; Refill: 3  - estradiol (VAGIFEM) 10 MCG TABS vaginal tablet; Place 1 tablet (10 mcg) vaginally daily  Dispense: 18 tablet; Refill: 0             COUNSELING:  Reviewed preventive health counseling, as reflected in patient instructions        She reports that she has never smoked. She has never been exposed to tobacco smoke. She has never used smokeless tobacco.          Brooklynn Leyva MD   [facility]

## 2023-09-13 ENCOUNTER — TRANSFERRED RECORDS (OUTPATIENT)
Dept: HEALTH INFORMATION MANAGEMENT | Facility: CLINIC | Age: 58
End: 2023-09-13
Payer: COMMERCIAL

## 2023-09-27 ASSESSMENT — ANXIETY QUESTIONNAIRES
4. TROUBLE RELAXING: SEVERAL DAYS
5. BEING SO RESTLESS THAT IT IS HARD TO SIT STILL: NOT AT ALL
7. FEELING AFRAID AS IF SOMETHING AWFUL MIGHT HAPPEN: NOT AT ALL
IF YOU CHECKED OFF ANY PROBLEMS ON THIS QUESTIONNAIRE, HOW DIFFICULT HAVE THESE PROBLEMS MADE IT FOR YOU TO DO YOUR WORK, TAKE CARE OF THINGS AT HOME, OR GET ALONG WITH OTHER PEOPLE: SOMEWHAT DIFFICULT
6. BECOMING EASILY ANNOYED OR IRRITABLE: NOT AT ALL
2. NOT BEING ABLE TO STOP OR CONTROL WORRYING: SEVERAL DAYS
1. FEELING NERVOUS, ANXIOUS, OR ON EDGE: SEVERAL DAYS
GAD7 TOTAL SCORE: 4
3. WORRYING TOO MUCH ABOUT DIFFERENT THINGS: SEVERAL DAYS

## 2023-09-27 ASSESSMENT — PATIENT HEALTH QUESTIONNAIRE - PHQ9: SUM OF ALL RESPONSES TO PHQ QUESTIONS 1-9: 6

## 2023-09-28 ENCOUNTER — VIRTUAL VISIT (OUTPATIENT)
Dept: FAMILY MEDICINE | Facility: CLINIC | Age: 58
End: 2023-09-28
Payer: COMMERCIAL

## 2023-09-28 DIAGNOSIS — F41.1 GAD (GENERALIZED ANXIETY DISORDER): ICD-10-CM

## 2023-09-28 DIAGNOSIS — N95.1 VASOMOTOR SYMPTOMS DUE TO MENOPAUSE: ICD-10-CM

## 2023-09-28 DIAGNOSIS — F33.0 MILD EPISODE OF RECURRENT MAJOR DEPRESSIVE DISORDER (H): ICD-10-CM

## 2023-09-28 DIAGNOSIS — H93.8X1 ABNORMAL EAR SENSATION, RIGHT: Primary | ICD-10-CM

## 2023-09-28 PROCEDURE — 96127 BRIEF EMOTIONAL/BEHAV ASSMT: CPT | Mod: 95 | Performed by: FAMILY MEDICINE

## 2023-09-28 PROCEDURE — 99214 OFFICE O/P EST MOD 30 MIN: CPT | Mod: 95 | Performed by: FAMILY MEDICINE

## 2023-09-28 RX ORDER — ESTRADIOL 0.05 MG/D
1 PATCH, EXTENDED RELEASE TRANSDERMAL
Qty: 24 PATCH | Refills: 1 | Status: SHIPPED | OUTPATIENT
Start: 2023-09-28 | End: 2023-11-09

## 2023-09-28 RX ORDER — ESCITALOPRAM OXALATE 10 MG/1
10 TABLET ORAL DAILY
Qty: 90 TABLET | Refills: 3 | Status: SHIPPED | OUTPATIENT
Start: 2023-09-28 | End: 2023-11-09

## 2023-09-28 ASSESSMENT — ANXIETY QUESTIONNAIRES
2. NOT BEING ABLE TO STOP OR CONTROL WORRYING: SEVERAL DAYS
7. FEELING AFRAID AS IF SOMETHING AWFUL MIGHT HAPPEN: NOT AT ALL
GAD7 TOTAL SCORE: 4
3. WORRYING TOO MUCH ABOUT DIFFERENT THINGS: SEVERAL DAYS
6. BECOMING EASILY ANNOYED OR IRRITABLE: NOT AT ALL
GAD7 TOTAL SCORE: 4
1. FEELING NERVOUS, ANXIOUS, OR ON EDGE: SEVERAL DAYS
4. TROUBLE RELAXING: SEVERAL DAYS
5. BEING SO RESTLESS THAT IT IS HARD TO SIT STILL: NOT AT ALL
IF YOU CHECKED OFF ANY PROBLEMS ON THIS QUESTIONNAIRE, HOW DIFFICULT HAVE THESE PROBLEMS MADE IT FOR YOU TO DO YOUR WORK, TAKE CARE OF THINGS AT HOME, OR GET ALONG WITH OTHER PEOPLE: SOMEWHAT DIFFICULT

## 2023-09-28 ASSESSMENT — PATIENT HEALTH QUESTIONNAIRE - PHQ9
SUM OF ALL RESPONSES TO PHQ QUESTIONS 1-9: 6
10. IF YOU CHECKED OFF ANY PROBLEMS, HOW DIFFICULT HAVE THESE PROBLEMS MADE IT FOR YOU TO DO YOUR WORK, TAKE CARE OF THINGS AT HOME, OR GET ALONG WITH OTHER PEOPLE: SOMEWHAT DIFFICULT

## 2023-09-28 NOTE — PATIENT INSTRUCTIONS
Apply the estradiol 0.05 mg patch to the lower abdomen or upper buttocks, and change it twice weekly approximately 3.5 days apart.  Insert the vaginal estrogen tablet once nightly at bedtime for two weeks then twice weekly for maintenance  Follow-up with me via telephone or video in 1 month.  The hot flashes should be improved by that point.  The urinary symptoms may take a few months to improve.

## 2023-09-28 NOTE — PROGRESS NOTES
Bárbara is a 58 year old who is being evaluated via a billable video visit.      How would you like to obtain your AVS? MyChart  If the video visit is dropped, the invitation should be resent by: Text to cell phone: [phone]  Will anyone else be joining your video visit? No          Assessment & Plan      Menopausal syndrome (hot flashes)  Daily hot flashes, night sweats, insomnia. Has estrogen patches, but has not started yet. Discussed again today and she would like to try them.  I had actually prescription for the 0.05 mg patches. she will let me know if any concerns or if symptoms not controlled on low dose and I can adjust dose.  She will follow-up with me via telephone in 1 month.  She is status post hysterectomy-no need for endometrial protection with progesterone     Discussed risks/side effects/benefits of HT. Will start low dose estradiol patch 0.025mg twice weekly and follow up in a month and increase dose if needed at that time. Progestogen not needed as she has had a hysterectomy in 2016.  Gave resources and patient instructions as well     - estradiol (VIVELLE-DOT) 0.025 MG/24HR bi-weekly patch; Place 1 patch onto the skin twice a week  Dispense: 8 patch; Refill: 1     Urinary urgency     Possibly GSM  Will start vaginal estrogen- she has not started yet. Reviewed instructions today. See below.   Consider return to pelvic floor therapy.  Insurance would not cover estrogen cream.  Discussed she may see some benefit from the estradiol patch as well.     - estradiol (VAGIFEM) 10 MCG TABS vaginal tablet; Place 1 tablet (10 mcg) vaginally twice a week  Dispense: 24 tablet; Refill: 3  - estradiol (VAGIFEM) 10 MCG TABS vaginal tablet; Place 1 tablet (10 mcg) vaginally daily  Dispense: 18 tablet; Refill: 0        Mild episode of recurrent major depressive disorder (H)  Improving on lexapro 10mg daily. She would like to remain at current dose. refilled for 1 year     Uncontrolled.  Grieving.  See notes  "above.  - escitalopram (LEXAPRO) 10 MG tablet; Take 1 tablet (10 mg) by mouth daily  Dispense: 90 tablet; Refill: 1     Abnormal ear sensation  She requested a referral to ENT today     Elevated blood pressure without diagnosis of hypertension  Continues to exercise regularly and trying to eat more healthfully.  Also recommended reducing alcohol    Grief  Mom passed away this year. Coping sometimes with alcohol, up to about 3 glasses of wine some nights, does not have alcohol every night. Prior to this, only had alcohol occasionally. She'd like to visit with Henry Trevizo again     BMI:   Estimated body mass index is 25.98 kg/m  as calculated from the following:    Height as of 7/11/23: 1.575 m (5' 2\").    Weight as of 7/11/23: 64.4 kg (142 lb 0.8 oz).       Patient Instructions   Apply the estradiol 0.05 mg patch to the lower abdomen or upper buttocks, and change it twice weekly approximately 3.5 days apart.  Insert the vaginal estrogen tablet once nightly at bedtime for two weeks then twice weekly for maintenance  Follow-up with me via telephone or video in 1 month.  The hot flashes should be improved by that point.  The urinary symptoms may take a few months to improve.     Brooklynn Leyva MD   [facility]    Shani Granger is a 58 year old, presenting for the following health issues:  RECHECK (Follow Up )      9/28/2023     9:11 AM   Additional Questions   Roomed by Kathryn Contreras       History of Present Illness       Mental Health Follow-up:  Patient presents to follow-up on Depression & Anxiety.Patient's depression since last visit has been:  Good  The patient is not having other symptoms associated with depression.  Patient's anxiety since last visit has been:  Good  The patient is not having other symptoms associated with anxiety.  Any significant life events: job concerns, grief or loss and health concerns  Patient is not feeling anxious or having panic attacks.  Patient has " concerns about alcohol or drug use.    She eats 0-1 servings of fruits and vegetables daily.She consumes 1 sweetened beverage(s) daily.She exercises with enough effort to increase her heart rate 30 to 60 minutes per day.  She exercises with enough effort to increase her heart rate 5 days per week.   She is taking medications regularly.        She needs a referral. Would like to go to an ENT. Has a constant ear tickle.     Hoping she is being a hypochondriac. Feels like she is putting her grief for her mom into noticing things happening with her body. Freaked out about aging and stuff and getting sick because her mom's illness was such a shock. Normally she does not think about that stuff.     Feels like she is having one of her most unhealthy years. Doing some self-medicating. Scared to get lab work. She has been drinking in phases, not every day, but has been having some on days that she does not work, she will have wine at dinner and then it becomes three glasses of wine, then will go three days without alcohol. Getting to wear she dreads alcohol. Started this when mom went into hospice. Not blacking out. Just doing on a regular basis is not normal for her. Thinks that she needs lab work. Was so proud of her vitals when she was exercising regularly. Thinks bp started going up when she had her hysterectomy. Has not been able to be a runner like she was. Always active, biking, but says she eats like a little kid.     Gets dizzy sometimes. Has never been dizzy. 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 It is not predictable. Has not changed her diet. 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 Was clenching her jaw a lot. Almost instantly after they passed the vote she is feeling better. A little dizzy here and there around that time.     Things run in her family. High bp in her family, but mom did not need medication.      Thinks she needs grief counseling.       Review of Systems         Objective       BP Readings from Last 6 Encounters:   07/11/23 (!) 146/88   07/19/21 120/77   03/31/21 133/77   09/12/19 112/58   08/29/19 112/68   04/25/19 113/72   Recheck 7/11/23     Vitals:  No vitals were obtained today due to virtual visit.    Physical Exam   GENERAL: Healthy, alert and no distress              Video-Visit Details    Type of service:  telephone visit   Visit duration: 33 minutes    Originating Location (pt. Location): Home    Distant Location (provider location):  Off-site  Platform used for Video Visit: Telephone

## 2023-10-02 ENCOUNTER — TELEPHONE (OUTPATIENT)
Dept: FAMILY MEDICINE | Facility: CLINIC | Age: 58
End: 2023-10-02
Payer: COMMERCIAL

## 2023-10-02 ENCOUNTER — TELEPHONE (OUTPATIENT)
Dept: OTOLARYNGOLOGY | Facility: CLINIC | Age: 58
End: 2023-10-02
Payer: COMMERCIAL

## 2023-10-02 DIAGNOSIS — Z13.1 SCREENING FOR DIABETES MELLITUS: ICD-10-CM

## 2023-10-02 DIAGNOSIS — R42 DIZZINESS: ICD-10-CM

## 2023-10-02 DIAGNOSIS — Z13.220 LIPID SCREENING: ICD-10-CM

## 2023-10-02 DIAGNOSIS — H93.19 TINNITUS, UNSPECIFIED LATERALITY: Primary | ICD-10-CM

## 2023-10-02 NOTE — TELEPHONE ENCOUNTER
Pt did an Evisit with Dr. Leyva last week.  Got a referral for ENT. Not set up yet. Phone on referral seemed to ring and ring.  Right ear tinnitis. Pulsating sound in right ear.  Kept her up last night. Dizzy with walking the dog last night.    PT started a estrogen patch last Thursday.    What should pt be worried about? The pulsating was very bothersome and hindered sleep last night.  Sending to Dr. Leyva.        OK to leave a detailed message.  AMARIS Sawyer

## 2023-10-02 NOTE — TELEPHONE ENCOUNTER
[facility]    Phone Message    May a detailed message be left on voicemail: yes     Reason for Call: Other: New Vestibular - Urgent 1-2 week referral from Brooklynn Leyva, for Dizziness.  Please contact pt for scheduling.  Pt prefers [facility] location as she does not drive, Thanks      Action Taken: Other: ENT    Travel Screening: Not Applicable

## 2023-10-02 NOTE — TELEPHONE ENCOUNTER
I agree with your recommendations and her idea to stop using the headphones. Since symptoms started before the patch, I do not think they are related to the estradiol patch. She can try the two 0.025mg patches or  the 0.05mg patches since she is still having hot flashes. I signed the order for glucose and A1c. I think it is reasonable to check these and I added a lipid test if she wants too.     Brooklynn Leyva M.D.

## 2023-10-02 NOTE — TELEPHONE ENCOUNTER
"Dr. Leyva-Please review and advise.    Called patient and reviewed message per Dr. Leyva.    Patient verbalized understanding and stated she had these symptoms before use of estrogen patch but did decrease from two patches to one patch.    Tinnitus started over the summer and three to four correlations:  Increase stress-clenches jaw  Saw Dentist on Friday who checked her BP, which was a good readin/70's  Has new \"bone conducting\"  headphones and will stop using them to see if helps reduce/resolve tinnitus  Still has menopausal symptoms  Dizziness impacts her function-wonders if her blood glucose needs to be checked?  Family history of diabetes and hypoglycemia  Physically active  Thinking about trying fasting    Writer recommended patient avoid fasting and focus on hydration and eating usual diet/foods.  Will send message back to Dr. Leyva and patient will be called once response received.    Patient verbalized understanding and in agreement with plan.      Thank you!  LAI CristinaN, RN-BC  [facility]    "

## 2023-10-02 NOTE — TELEPHONE ENCOUNTER
I placed a priority referral for tinnitus and dizziness. She should receive a call from them in the next 1-2 days.   She can stop the estrogen patch for now and hold off on using until after she sees ENT. I do not think they are likely related, but if they seem correlated, then I think it is reasonable to be cautious. Please let her know. Brooklynn Leyva M.D.

## 2023-10-03 ENCOUNTER — TELEPHONE (OUTPATIENT)
Dept: FAMILY MEDICINE | Facility: CLINIC | Age: 58
End: 2023-10-03
Payer: COMMERCIAL

## 2023-10-03 ENCOUNTER — MYC MEDICAL ADVICE (OUTPATIENT)
Dept: FAMILY MEDICINE | Facility: CLINIC | Age: 58
End: 2023-10-03
Payer: COMMERCIAL

## 2023-10-03 DIAGNOSIS — H93.8X9 ABNORMAL SENSATION IN EAR, UNSPECIFIED LATERALITY: Primary | ICD-10-CM

## 2023-10-03 NOTE — TELEPHONE ENCOUNTER
Dr. Leyva-Please review.  Are you aware of any other options for patient?    Call received from patient:  Pulsing in ear and ENT does not have any appointments in [place] until January 2024.  2. Concerned about possible carotid artery cause of symptoms-looked online for A. information.    A. Discussed with patient that if Dr. Leyva was concerned about carotid artery issue, it is unlike ENT referral would have been ordered.  Please avoid looking up symptoms online.  3. Insomnia the past 2 nights due to ear pulsating symptom and continues to grieve the passing of her mother.    Writer offered to reach out to colleagues of Monse Ferguson, to see if patient could have a virtual visit prior to visit with Monse Ferguson, on 10/25/23.  Patient declined.    Writer offered to call ENT scheduling line to ask if any ENT clinic within [facility] has opening prior to January 2024.    Patient verbalized understanding and in agreement with plan.    Writer called ENT scheduling and spoke with Maggi who stated earliest available ENT appointment within [facility] is January 2024 and there are preferred partner clinics.    Preferred partner clinics:   -[facility] [place]: [phone].  -[facility]: [phone]  -[facility]: [phone]  -[facility]: [phone].    Writer asked Maggi if referring provider would have option of discussing urgency of appointment with ENT provider and Maggi stated she was not aware of that option with ENT specialty.    Writer called patient and reviewed [facility] ENT scheduling availability and preferred partner clinics.    Patient verbalized understanding and in agreement with plan.    [employer] message with preferred partner clinics sent to patient, per patient's request.    Thank you!  SHAMEKA Rose, BSN, RN-[facility]

## 2023-10-03 NOTE — TELEPHONE ENCOUNTER
Since this has been so acutely disturbing to her, I think it might work best for her to be seen in an  so that her ear can be examined in person and someone can listen for bruits and hopefully provide her with some reassurance. I did place a referral to [facility] [place] as well. Brooklynn Leyva M.D.

## 2023-10-03 NOTE — TELEPHONE ENCOUNTER
[facility] Call [facility]    Phone Message    May a detailed message be left on voicemail: yes     Reason for Call: Appointment Intake    Referring Provider Name: Brooklynn Leyva  Diagnosis and/or Symptoms: Tinnitus and Dizziness      She will be available today around 1130am, if she doesn't answer please leave detailed VM. She is available Wed, Thurs, and Friday every week and make anything work.      [facility] sending [facility] for review    Action Taken: Other: ENT    Travel Screening: Not Applicable

## 2023-10-03 NOTE — TELEPHONE ENCOUNTER
Writer called patient:   Left message to call back and ask to speak with an available triage nurse, or check MyChart messages.    MyChart message sent to patient.    TOMY Cristina, RN-BC  [facility]     none

## 2023-10-12 ENCOUNTER — LAB (OUTPATIENT)
Dept: LAB | Facility: CLINIC | Age: 58
End: 2023-10-12
Payer: COMMERCIAL

## 2023-10-12 DIAGNOSIS — Z13.1 SCREENING FOR DIABETES MELLITUS: ICD-10-CM

## 2023-10-12 DIAGNOSIS — Z13.220 LIPID SCREENING: ICD-10-CM

## 2023-10-12 LAB
CHOLEST SERPL-MCNC: 238 MG/DL
FASTING STATUS PATIENT QL REPORTED: YES
GLUCOSE SERPL-MCNC: 101 MG/DL (ref 70–99)
HBA1C MFR BLD: 5.2 % (ref 0–5.6)
HDLC SERPL-MCNC: 80 MG/DL
LDLC SERPL CALC-MCNC: 143 MG/DL
NONHDLC SERPL-MCNC: 158 MG/DL
TRIGL SERPL-MCNC: 74 MG/DL

## 2023-10-12 PROCEDURE — 82947 ASSAY GLUCOSE BLOOD QUANT: CPT

## 2023-10-12 PROCEDURE — 83036 HEMOGLOBIN GLYCOSYLATED A1C: CPT

## 2023-10-12 PROCEDURE — 80061 LIPID PANEL: CPT

## 2023-10-12 PROCEDURE — 36415 COLL VENOUS BLD VENIPUNCTURE: CPT

## 2023-10-19 ENCOUNTER — VIRTUAL VISIT (OUTPATIENT)
Dept: FAMILY MEDICINE | Facility: CLINIC | Age: 58
End: 2023-10-19
Payer: COMMERCIAL

## 2023-10-19 ENCOUNTER — PATIENT OUTREACH (OUTPATIENT)
Dept: CARE COORDINATION | Facility: CLINIC | Age: 58
End: 2023-10-19

## 2023-10-19 DIAGNOSIS — F41.1 GAD (GENERALIZED ANXIETY DISORDER): ICD-10-CM

## 2023-10-19 DIAGNOSIS — R42 DIZZINESS: ICD-10-CM

## 2023-10-19 DIAGNOSIS — H93.8X9 ABNORMAL SENSATION IN EAR, UNSPECIFIED LATERALITY: Primary | ICD-10-CM

## 2023-10-19 DIAGNOSIS — N95.1 MENOPAUSAL SYNDROME (HOT FLASHES): ICD-10-CM

## 2023-10-19 DIAGNOSIS — F33.0 MILD EPISODE OF RECURRENT MAJOR DEPRESSIVE DISORDER (H): ICD-10-CM

## 2023-10-19 PROCEDURE — 99214 OFFICE O/P EST MOD 30 MIN: CPT | Mod: 95 | Performed by: FAMILY MEDICINE

## 2023-10-19 NOTE — PROGRESS NOTES
Bárbara is a 58 year old who is being evaluated via a billable video visit.              Assessment & Plan      Ear pulsations -faster than her pulse  dizziness  Improved when she took an antihistamine.  Not occurring today.  Also seemed to improve when she avoided alcohol.  Has been alcohol free for 3 days.  I suggested remaining on a daily antihistamine for maybe next month.  We will follow-up next month      Menopausal syndrome (hot flashes)   Improved.  Has been using it for couple of weeks and has the occasional hot flash still.  The hot flashes are milder.  We will continue 0.05 mg estrogen patch and plan on follow-up in 2 to 4 weeks.    Daily hot flashes, night sweats, insomnia. Has estrogen patches, but has not started yet. Discussed again today and she would like to try them.  I had actually prescription for the 0.05 mg patches. she will let me know if any concerns or if symptoms not controlled on low dose and I can adjust dose.  She will follow-up with me via telephone in 1 month.  She is status post hysterectomy-no need for endometrial protection with progesterone     Discussed risks/side effects/benefits of HT. Will start low dose estradiol patch 0.025mg twice weekly and follow up in a month and increase dose if needed at that time. Progestogen not needed as she has had a hysterectomy in 2016.  Gave resources and patient instructions as well     - estradiol (VIVELLE-DOT) 0.025 MG/24HR bi-weekly patch; Place 1 patch onto the skin twice a week  Dispense: 8 patch; Refill: 1     Urinary urgency - not discussed further today      Possibly GSM  Will start vaginal estrogen- she has not started yet. Reviewed instructions today. See below.   Consider return to pelvic floor therapy.  Insurance would not cover estrogen cream.  Discussed she may see some benefit from the estradiol patch as well.     - estradiol (VAGIFEM) 10 MCG TABS vaginal tablet; Place 1 tablet (10 mcg) vaginally twice a week  Dispense: 24 tablet;  "Refill: 3  - estradiol (VAGIFEM) 10 MCG TABS vaginal tablet; Place 1 tablet (10 mcg) vaginally daily  Dispense: 18 tablet; Refill: 0         Mild episode of recurrent major depressive disorder (H)   Improving on lexapro 10mg daily. She would like to remain at current dose. refilled for 1 year last visit     Uncontrolled.  Grieving.  See notes above.  - escitalopram (LEXAPRO) 10 MG tablet; Take 1 tablet (10 mg) by mouth daily  Dispense: 90 tablet; Refill: 1         Elevated blood pressure without diagnosis of hypertension   Continues to exercise regularly and trying to eat more healthfully.  Also recommended reducing alcohol, which she is now doing. Plans on remaining abstinent for awhile     Grief   Finds that she worries more about physical symptoms, wonders what is normal for her age and when to worry.   Mom passed away this year. Coping sometimes with alcohol, up to about 3 glasses of wine some nights, does not have alcohol every night. Prior to this, only had alcohol occasionally. She'd like to visit with Henry Edwards  again    I reviewed her lipids, A1c and glucose with her today.  All are in reasonable range with exception to mildly elevated LDL.  Discussed lifestyle modification.  She plans on working toward more of a Mediterranean/anti-inflammatory diet.             BMI:   Estimated body mass index is 25.98 kg/m  as calculated from the following:    Height as of 7/11/23: 1.575 m (5' 2\").    Weight as of 7/11/23: 64.4 kg (142 lb 0.8 oz).         Brooklynn Leyva MD,   [facility]    Shani Granger is a 58 year old, presenting for the following health issues:  No chief complaint on file.       History of Present Illness       Reason for visit:  Ear pulse    She eats 2-3 servings of fruits and vegetables daily.She consumes 0 sweetened beverage(s) daily.She exercises with enough effort to increase her heart rate 30 to 60 minutes per day.  She exercises with enough effort to increase " her heart rate 4 days per week.   She is taking medications regularly.     Had it pretty bad yestetrday morning. It went away when she went through it a couple of weeks ago, then on and off a little bit. A friend mentioned she gets it when she gets allergies.  So she did a regimen of allergies for a day and a half was able to get it to go away. The n at work it was coming and going. Took antihitamine. Still there yetserday morning. Walked her dogs. Now it is gone. Felt her pulse and it is not the same as her heartbeat. Her numbers are higher than 2019 for blood work, so she was concerned about that.       Review of Systems        Objective           Vitals:  No vitals were obtained today due to virtual visit.    Physical Exam   GENERAL: Healthy, alert and no distress  EYES: Eyes grossly normal to inspection.  No discharge or erythema, or obvious scleral/conjunctival abnormalities.  RESP: No audible wheeze, cough, or visible cyanosis.  No visible retractions or increased work of breathing.    SKIN: Visible skin clear. No significant rash, abnormal pigmentation or lesions.  NEURO: Cranial nerves grossly intact.  Mentation and speech appropriate for age.  PSYCH: Mentation appears normal, affect normal/bright, judgement and insight intact, normal speech and appearance well-groomed.    The 10-year ASCVD risk score (Loyd DK, et al., 2019) is: 2.7%    Values used to calculate the score:      Age: 58 years      Sex: Female      Is Non- : No      Diabetic: No      Tobacco smoker: No      Systolic Blood Pressure: 146 mmHg      Is BP treated: No      HDL Cholesterol: 80 mg/dL      Total Cholesterol: 238 mg/dL     Lab on 10/12/2023   Component Date Value Ref Range Status    Glucose 10/12/2023 101 (H)  70 - 99 mg/dL Final    Patient Fasting > 8hrs? 10/12/2023 Yes   Final    Hemoglobin A1C 10/12/2023 5.2  0.0 - 5.6 % Final    Normal <5.7%   Prediabetes 5.7-6.4%    Diabetes 6.5% or higher     Note:  Adopted from ADA consensus guidelines.    Cholesterol 10/12/2023 238 (H)  <200 mg/dL Final    Triglycerides 10/12/2023 74  <150 mg/dL Final    Direct Measure HDL 10/12/2023 80  >=50 mg/dL Final    LDL Cholesterol Calculated 10/12/2023 143 (H)  <=100 mg/dL Final    Non HDL Cholesterol 10/12/2023 158 (H)  <130 mg/dL Final              Video-Visit Details    Type of service:  Video Visit   Changed to telephone visit.  Visit duration 31 minutes.    Originating Location (pt. Location): Home    Distant Location (provider location):  Off-site  Platform used for Video Visit: [employer]

## 2023-10-19 NOTE — PATIENT INSTRUCTIONS
1.  Continue daily antihistamine for the next month.  We can follow-up with me visit in a few weeks.  Monitor the dizziness and ear pulsation episodes in the meantime.  2.  Continue the estrogen patch.  We can adjust the dose if you are still having hot flashes when I talk with you in a few weeks.

## 2023-11-08 ASSESSMENT — PATIENT HEALTH QUESTIONNAIRE - PHQ9
SUM OF ALL RESPONSES TO PHQ QUESTIONS 1-9: 5
SUM OF ALL RESPONSES TO PHQ QUESTIONS 1-9: 5
10. IF YOU CHECKED OFF ANY PROBLEMS, HOW DIFFICULT HAVE THESE PROBLEMS MADE IT FOR YOU TO DO YOUR WORK, TAKE CARE OF THINGS AT HOME, OR GET ALONG WITH OTHER PEOPLE: SOMEWHAT DIFFICULT

## 2023-11-09 ENCOUNTER — VIRTUAL VISIT (OUTPATIENT)
Dept: FAMILY MEDICINE | Facility: CLINIC | Age: 58
End: 2023-11-09
Payer: COMMERCIAL

## 2023-11-09 DIAGNOSIS — N95.1 VASOMOTOR SYMPTOMS DUE TO MENOPAUSE: ICD-10-CM

## 2023-11-09 DIAGNOSIS — R39.15 URINARY URGENCY: ICD-10-CM

## 2023-11-09 DIAGNOSIS — F33.0 MILD EPISODE OF RECURRENT MAJOR DEPRESSIVE DISORDER (H): ICD-10-CM

## 2023-11-09 DIAGNOSIS — F41.1 GAD (GENERALIZED ANXIETY DISORDER): ICD-10-CM

## 2023-11-09 DIAGNOSIS — H93.8X9 ABNORMAL SENSATION IN EAR, UNSPECIFIED LATERALITY: Primary | ICD-10-CM

## 2023-11-09 PROBLEM — S83.201A: Status: ACTIVE | Noted: 2021-03-11

## 2023-11-09 PROCEDURE — 99443 PR PHYSICIAN TELEPHONE EVALUATION 21-30 MIN: CPT | Mod: 95 | Performed by: FAMILY MEDICINE

## 2023-11-09 RX ORDER — ESTRADIOL 0.05 MG/D
1 PATCH, EXTENDED RELEASE TRANSDERMAL
Qty: 24 PATCH | Refills: 3 | Status: SHIPPED | OUTPATIENT
Start: 2023-11-09 | End: 2024-07-16

## 2023-11-09 RX ORDER — ESTRADIOL 10 UG/1
10 INSERT VAGINAL DAILY
Qty: 18 TABLET | Refills: 0 | Status: CANCELLED | OUTPATIENT
Start: 2023-11-09

## 2023-11-09 RX ORDER — ESTRADIOL 10 UG/1
INSERT VAGINAL
Qty: 24 TABLET | Refills: 3 | Status: SHIPPED | OUTPATIENT
Start: 2023-11-09 | End: 2024-03-26

## 2023-11-09 RX ORDER — ESCITALOPRAM OXALATE 10 MG/1
10 TABLET ORAL DAILY
Qty: 90 TABLET | Refills: 3 | Status: SHIPPED | OUTPATIENT
Start: 2023-11-09 | End: 2024-03-26

## 2023-11-09 NOTE — PROGRESS NOTES
Bárbara is a 58 year old who is being evaluated via a billable telephone visit.      What phone number would you like to be contacted at? [phone]  How would you like to obtain your AVS? Matt    Distant Location (provider location):  Off-site    Assessment & Plan      Ear pulsations -faster than her pulse  dizziness  Saw ENT. Symptoms were not happening on the day of her visit. Ear exam and hearing check were okay. Left ear had tiny bit of hearing loss initially, but on retest it was better.   The sensation started again over the weekend. And is intermittent since this weekend. May try antihistamine again since that worked previously. Will await ENT report. Overall the visit was reassuring to her. I suspect it may be a stapedius muscle  spasm.     Improved when she took an antihistamine.  Not occurring today.  Also seemed to improve when she avoided alcohol.  Has been alcohol free for 3 days.  I suggested remaining on a daily antihistamine for maybe next month.  We will follow-up next month        Menopausal syndrome (hot flashes)   Improved.  she has a few hot flashes. She would like to remain on the same dose of estrogen patch. We will continue 0.05 mg estrogen patch and plan on follow-up in 2 to 4 weeks.     Daily hot flashes, night sweats, insomnia. Has estrogen patches, but has not started yet. Discussed again today and she would like to try them.  I had actually prescription for the 0.05 mg patches. she will let me know if any concerns or if symptoms not controlled on low dose and I can adjust dose.  She will follow-up with me via telephone in 1 month.  She is status post hysterectomy-no need for endometrial protection with progesterone     Discussed risks/side effects/benefits of HT. Will start low dose estradiol patch 0.025mg twice weekly and follow up in a month and increase dose if needed at that time. Progestogen not needed as she has had a hysterectomy in 2016.  Gave resources and patient instructions  as well     - estradiol (VIVELLE-DOT) 0.025 MG/24HR bi-weekly patch; Place 1 patch onto the skin twice a week  Dispense: 8 patch; Refill: 1     Urinary urgency   She has not tried vaginal estrogen yet, but may try in the near future. Vagifem tablets sent to pharmacy again.      Possibly GSM  Will start vaginal estrogen- she has not started yet. Reviewed instructions today. See below.   Consider return to pelvic floor therapy.  Insurance would not cover estrogen cream.  Discussed she may see some benefit from the estradiol patch as well.     - estradiol (VAGIFEM) 10 MCG TABS vaginal tablet; Place 1 tablet (10 mcg) vaginally twice a week  Dispense: 24 tablet; Refill: 3  - estradiol (VAGIFEM) 10 MCG TABS vaginal tablet; Place 1 tablet (10 mcg) vaginally daily  Dispense: 18 tablet; Refill: 0         Mild episode of recurrent major depressive disorder (H)   Improving on lexapro 10mg daily. She would like to remain at current dose. refilled  again today per her request     Uncontrolled.  Grieving.  See notes above.  - escitalopram (LEXAPRO) 10 MG tablet; Take 1 tablet (10 mg) by mouth daily  Dispense: 90 tablet; Refill: 1         Elevated blood pressure without diagnosis of hypertension   Continues to exercise regularly and trying to eat more healthfully.  Also recommended reducing alcohol, which she is now doing. Plans on remaining abstinent for awhile     Grief   Finds that she worries more about physical symptoms, wonders what is normal for her age and when to worry.   Mom passed away this year. Coping sometimes with alcohol, up to about 3 glasses of wine some nights, does not have alcohol every night. Prior to this, only had alcohol occasionally. She'd like to visit with Henry Edwards  again     I reviewed her lipids, A1c and glucose with her today.  All are in reasonable range with exception to mildly elevated LDL.  Discussed lifestyle modification.  She plans on working toward more of a  "Mediterranean/anti-inflammatory diet.                 BMI:   Estimated body mass index is 25.98 kg/m  as calculated from the following:    Height as of 7/11/23: 1.575 m (5' 2\").    Weight as of 7/11/23: 64.4 kg (142 lb 0.8 oz).         Brooklynn Leyva MD  [facility] RASHEED Granger is a 58 year old, presenting for the following health issues:  Follow Up  (ENT )       History of Present Illness       Reason for visit:  Update on appointment with ENT    She eats 2-3 servings of fruits and vegetables daily.She consumes 0 sweetened beverage(s) daily.She exercises with enough effort to increase her heart rate 30 to 60 minutes per day.  She exercises with enough effort to increase her heart rate 5 days per week.   She is taking medications regularly.        ENT visit was reassuring. She is still unsure of cause of the pulsating sound in her ear.     About 10 years ago. With her fibroid and her incontinence, she does not hydrate well to avoid pressure on her bladder and leakage. Worries about dehydration    Review of Systems        Objective           Vitals:  No vitals were obtained today due to virtual visit.    Physical Exam   healthy, alert, and no distress  PSYCH: Alert and oriented times 3; coherent speech, normal   rate and volume, able to articulate logical thoughts, able   to abstract reason, no tangential thoughts, no hallucinations   or delusions  Her affect is normal  RESP: No cough, no audible wheezing, able to talk in full sentences  Remainder of exam unable to be completed due to telephone visits              Phone call duration: 49  minutes      "

## 2023-11-16 ENCOUNTER — PATIENT OUTREACH (OUTPATIENT)
Dept: CARE COORDINATION | Facility: CLINIC | Age: 58
End: 2023-11-16
Payer: COMMERCIAL

## 2023-11-29 ENCOUNTER — TELEPHONE (OUTPATIENT)
Dept: FAMILY MEDICINE | Facility: CLINIC | Age: 58
End: 2023-11-29
Payer: COMMERCIAL

## 2023-11-29 NOTE — TELEPHONE ENCOUNTER
Prior Authorization Retail Medication Request    Medication/Dose: Estradiol 0.05mg/24hr PTTW  Diagnosis and ICD code (if different than what is on RX):    New/renewal/insurance change PA/secondary ins. PA:  Previously Tried and Failed:    Rationale:      Insurance   Primary: TIRSO ARANA  Insurance ID:  [id number] Group: [id number] PCN: [id number]    Secondary (if applicable):  Insurance ID:      Pharmacy Information (if different than what is on RX)  Name:  [facility]  Phone:  [phone]   Fax:[phone]    Antonia Funes  Pharmacy Technician   [facility]  [phone]

## 2023-12-01 NOTE — TELEPHONE ENCOUNTER
Central Prior Authorization Team   Phone: [phone]    PA Initiation    Medication: Estradiol 0.05mg/24hr PTTW  Insurance Company: [employer] Non-Specialty PA's - Phone [phone] Fax [phone]  Pharmacy Filling the Rx: [facility] - [place], [place] - [address] [facility]  Filling Pharmacy Phone: [phone]  Filling Pharmacy Fax:    Start Date: 12/1/2023

## 2023-12-08 ENCOUNTER — TELEPHONE (OUTPATIENT)
Dept: FAMILY MEDICINE | Facility: CLINIC | Age: 58
End: 2023-12-08
Payer: COMMERCIAL

## 2023-12-13 NOTE — TELEPHONE ENCOUNTER
Central Prior Authorization Team   Phone: [phone]    PA Initiation    Medication: Marifer patch  Insurance Company: [employer] Non-Specialty PA's - Phone [phone] Fax [phone]  Pharmacy Filling the Rx: [facility] - [place], [place] - [address] [place] [address]  Filling Pharmacy Phone: [phone]  Filling Pharmacy Fax: [phone]  Start Date: 12/13/2023

## 2023-12-15 NOTE — TELEPHONE ENCOUNTER
PA not needed.  Pharmacy states generic is available.  They processed generic through insurance and is ready for .  Patient notified.

## 2023-12-15 NOTE — TELEPHONE ENCOUNTER
Prior Authorization Not Needed per Insurance    Medication: Estradiol 0.05mg/24hr PTTW  Insurance Company: [employer] Non-Specialty PA's - Phone [phone] Fax [phone]  Expected CoPay:      Pharmacy Filling the Rx: [facility] - [place], [place] - [address]

## 2024-02-11 ENCOUNTER — OFFICE VISIT (OUTPATIENT)
Dept: URGENT CARE | Facility: URGENT CARE | Age: 59
End: 2024-02-11
Payer: COMMERCIAL

## 2024-02-11 VITALS
TEMPERATURE: 97.3 F | HEART RATE: 73 BPM | WEIGHT: 148 LBS | DIASTOLIC BLOOD PRESSURE: 89 MMHG | SYSTOLIC BLOOD PRESSURE: 124 MMHG | HEIGHT: 63 IN | OXYGEN SATURATION: 98 % | BODY MASS INDEX: 26.22 KG/M2

## 2024-02-11 DIAGNOSIS — S61.316A LACERATION OF RIGHT LITTLE FINGER WITHOUT FOREIGN BODY WITH DAMAGE TO NAIL, INITIAL ENCOUNTER: ICD-10-CM

## 2024-02-11 DIAGNOSIS — S61.214A LACERATION OF RIGHT RING FINGER WITHOUT FOREIGN BODY WITHOUT DAMAGE TO NAIL, INITIAL ENCOUNTER: Primary | ICD-10-CM

## 2024-02-11 PROCEDURE — 90471 IMMUNIZATION ADMIN: CPT | Performed by: PHYSICIAN ASSISTANT

## 2024-02-11 PROCEDURE — 90715 TDAP VACCINE 7 YRS/> IM: CPT | Performed by: PHYSICIAN ASSISTANT

## 2024-02-11 PROCEDURE — 12002 RPR S/N/AX/GEN/TRNK2.6-7.5CM: CPT | Performed by: PHYSICIAN ASSISTANT

## 2024-02-11 RX ORDER — IBUPROFEN 400 MG/1
400 TABLET, FILM COATED ORAL EVERY 6 HOURS PRN
COMMUNITY

## 2024-02-11 NOTE — PROGRESS NOTES
"Chief Complaint   Patient presents with    Urgent Care    Laceration     Pt in clinic to have eval for right hand finger lacerations  2/24/2004 last tetanus       ASSESSMENT/PLAN:  Bárbara was seen today for urgent care and laceration.    Diagnoses and all orders for this visit:    Laceration of right ring finger without foreign body without damage to nail, initial encounter    Laceration of right little finger without foreign body with damage to nail, initial encounter    Updated tetanus today.  Few avulsions and lacerations on pinky and ring finger, two needing primary closure.  Some of the skin tissue was avulsed or shredded and cannot be repaired, they will heal by secondary intention.  Discussed wound care at length.  Return precautions.  Return in 7 to 10 days for suture removal    Procedure:   Wound was soaked and cleaned.  Pinky finger avulsion not amenable to closure.  Ring block of the ring finger performed with 3 cc of 1% lidocaine in the usual manner.  Area was then draped in a sterile manner.  5-0 Ethilon suture was used to approximate the wound edges for 2 separate lacerations on the ring finger.  Some tissue at the distal and of the wound was dried and unamenable to primary closure.  Total length of closure was about 3.5 cm  Nonadherent stick pad applied over the wounds followed by Anderson Carroll PA-C      SUBJECTIVE:  Aliyah is a 58 year old female who presents to urgent care with finger laceration to the pinky and ring finger on the right and after her small battery-operated chainsaw kicked back on her.  Her last tetanus was in 2004.    ROS: Pertinent ROS neg other than the symptoms noted above in the HPI.     OBJECTIVE:  /89   Pulse 73   Temp 97.3  F (36.3  C) (Temporal)   Ht 1.6 m (5' 3\")   Wt 67.1 kg (148 lb)   LMP 06/11/2016   SpO2 98%   BMI 26.22 kg/m     GENERAL: alert and no distress  MS: Right hand: Pinky finger: Avulsed tissue over the ulnar aspect of the pinky tip " to the cuticle, nail remains intact but loose.  Ring finger: 1 cm partial-thickness laceration, proximal to this is a 2.5 cm laceration ulnar aspect of middle phalanx that is full-thickness with some serrated tissue on the distal end  SKIN: no suspicious lesions or rashes  NEURO: Normal strength and tone, mentation intact and speech normal    DIAGNOSTICS    No results found for any visits on 02/11/24.     Current Outpatient Medications   Medication    estradiol (VIVELLE-DOT) 0.05 MG/24HR bi-weekly patch    ibuprofen (ADVIL/MOTRIN) 400 MG tablet    escitalopram (LEXAPRO) 10 MG tablet    estradiol (VAGIFEM) 10 MCG TABS vaginal tablet    MAGNESIUM GLYCINATE PO    Multiple Vitamins-Minerals (MULTIVITAMIN ADULT PO)     No current facility-administered medications for this visit.      Patient Active Problem List   Diagnosis    CARDIOVASCULAR SCREENING; LDL GOAL LESS THAN 160    Major depression    Vitamin D deficiency    S/P laparoscopic supracervical hysterectomy    Urge incontinence    Right knee pain    WALLACE (generalized anxiety disorder)    Bucket-handle tear of meniscus of left knee as current injury      Past Medical History:   Diagnosis Date    Arthritis Inside of left knee near my meniscus injury    Closed fracture of unspecified bone 1990    left fifth metacarpal fracture - cast    Depressive disorder Alway    from birth    Heart murmur     Leukopenia     2010 and 2014    Leukopenia     Major depression      Past Surgical History:   Procedure Laterality Date    ABDOMEN SURGERY  6.15.16    Hysterectomy    BIOPSY  5/9/16    HYSTERECTOMY, SUPRACERVICAL LAPAROSCOPIC  6/15/16    LAPAROSCOPIC HYSTERECTOMY TOTAL, SALPINGECTOMY BILATERAL N/A 6/15/2016    Procedure: LAPAROSCOPIC HYSTERECTOMY TOTAL, SALPINGECTOMY BILATERAL;  Surgeon: Kavita Manley MD;  Location: [facility] OR    LAPAROSCOPIC SALPINGECTOMY Bilateral 6/15/16     Family History   Problem Relation Age of Onset    Heart Disease Father         early 40's had first  episode, ?heart related, age 42    Anxiety Disorder Father     Obesity Father     Diabetes Father     Hypertension Mother     Asthma Mother     Osteoporosis Mother     Arthritis Mother     Heart Disease Mother     Other Cancer Mother     Obesity Sister         s/p gastric bypass    Cancer Maternal Aunt         brain tumor    Eye Disorder Brother     Eye Disorder Sister     Diabetes Brother     Diabetes Brother     Obesity Brother     Hypertension Sister     Anxiety Disorder Sister     Thyroid Disease Sister     Obesity Sister     Depression Sister     Anxiety Disorder Brother     Diabetes Other      Social History     Tobacco Use    Smoking status: Never     Passive exposure: Past    Smokeless tobacco: Never   Substance Use Topics    Alcohol use: Yes     Comment: 5 drinks per week              The plan of care was discussed with the patient. They understand and agree with the course of treatment prescribed. A printed summary was given including instructions and medications.  The use of Dragon/Evo.com dictation services may have been used to construct the content in this note; any grammatical or spelling errors are non-intentional. Please contact the author of this note directly if you are in need of any clarification.

## 2024-02-11 NOTE — PATIENT INSTRUCTIONS
Apply Vaseline to the wounds followed by nonadherent stick pad and tape or Coban.    Return in about 7 to 10 days for removal of the sutures.    If you develop any redness, swelling, purulent discharge return to the clinic promptly

## 2024-02-12 ENCOUNTER — MYC MEDICAL ADVICE (OUTPATIENT)
Dept: FAMILY MEDICINE | Facility: CLINIC | Age: 59
End: 2024-02-12
Payer: COMMERCIAL

## 2024-02-12 ENCOUNTER — TELEPHONE (OUTPATIENT)
Dept: FAMILY MEDICINE | Facility: CLINIC | Age: 59
End: 2024-02-12
Payer: COMMERCIAL

## 2024-02-12 NOTE — TELEPHONE ENCOUNTER
Just a FYI that there is a RN suture removal visit on 02/21/24 at 1pm. Pt had sutures placed in urgent care on 02/11/24.

## 2024-02-12 NOTE — TELEPHONE ENCOUNTER
Reason for Call:  Appointment Request    Patient requesting this type of appt:  Nurse visit    Requested provider: MA/GILES/LPN Visit    Reason patient unable to be scheduled: Not within requested timeframe    When does patient want to be seen/preferred time:  7-10 days    Comments: Suture removal from right hand    Could we send this information to you in [employer] or would you prefer to receive a phone call?:   Patient would like to be contacted via [employer]    Call taken on 2/12/2024 at 10:01 AM by Lisbet Lam

## 2024-02-21 ENCOUNTER — ALLIED HEALTH/NURSE VISIT (OUTPATIENT)
Dept: FAMILY MEDICINE | Facility: CLINIC | Age: 59
End: 2024-02-21
Payer: COMMERCIAL

## 2024-02-21 DIAGNOSIS — Z48.02 ENCOUNTER FOR REMOVAL OF SUTURES: Primary | ICD-10-CM

## 2024-02-21 PROCEDURE — 99207 PR NO CHARGE NURSE ONLY: CPT

## 2024-02-21 NOTE — PROGRESS NOTES
Aliyah Askew presents to the clinic for removal of sutures and sutures,staples, steri strips. The patient has had sutures in place for 10 days. There has been no patient reported signs or symptoms of infection or drainage. 7  sutures and sutures,staples, staple, steri strips are seen and located on the right ring finger. Tetanus status is up to date. All sutures and sutures,staples, steri strips were easily removed today. Routine wound care discussed by the RN or provider.   Applied bandage adhesive and steristrips to support the continued healing of this finger wound.The patient will follow up as needed.   AMARIS Sawyer

## 2024-02-26 ENCOUNTER — TELEPHONE (OUTPATIENT)
Dept: FAMILY MEDICINE | Facility: CLINIC | Age: 59
End: 2024-02-26
Payer: COMMERCIAL

## 2024-02-26 DIAGNOSIS — Z13.1 SCREENING FOR DIABETES MELLITUS: ICD-10-CM

## 2024-02-26 DIAGNOSIS — Z13.220 LIPID SCREENING: Primary | ICD-10-CM

## 2024-02-26 NOTE — TELEPHONE ENCOUNTER
Dr. Leyva,    Pt is requesting order for A1C. She is in a nutrition program and has been working on eating healthier. Pt will be done with program at end of March. She would like to come in at that time and recheck her A1C with the Lipid panel.     Pt would like a call back from staff for order update status. Ok to left detail message on VM.    Ivana CERON RN  [facility]

## 2024-03-07 NOTE — TELEPHONE ENCOUNTER
Please call Bárbara and let her know that her lab orders are in. If she wants to see me too, I have openings for virtual visits next Tuesday or Thursday. If she prefers to see me in person, she could see me on Monday March 18 at 5:00 arrive 4:40 (okay to use the virtual slot for in person). Please schedule 40 minutes. Thanks, Brooklynn Leyva M.D.

## 2024-03-17 ENCOUNTER — HEALTH MAINTENANCE LETTER (OUTPATIENT)
Age: 59
End: 2024-03-17

## 2024-03-21 ENCOUNTER — LAB (OUTPATIENT)
Dept: LAB | Facility: CLINIC | Age: 59
End: 2024-03-21
Payer: COMMERCIAL

## 2024-03-21 DIAGNOSIS — Z13.6 CARDIOVASCULAR SCREENING; LDL GOAL LESS THAN 160: ICD-10-CM

## 2024-03-21 DIAGNOSIS — Z13.220 LIPID SCREENING: ICD-10-CM

## 2024-03-21 DIAGNOSIS — Z13.1 SCREENING FOR DIABETES MELLITUS: ICD-10-CM

## 2024-03-21 LAB — HBA1C MFR BLD: 5.3 % (ref 0–5.6)

## 2024-03-21 PROCEDURE — 83036 HEMOGLOBIN GLYCOSYLATED A1C: CPT

## 2024-03-21 PROCEDURE — 36415 COLL VENOUS BLD VENIPUNCTURE: CPT

## 2024-03-21 PROCEDURE — 80061 LIPID PANEL: CPT

## 2024-03-21 NOTE — RESULT ENCOUNTER NOTE
Excellent! Please call or send a [employer] message if you have any questions. Brooklynn Leyva M.D.

## 2024-03-22 LAB
CHOLEST SERPL-MCNC: 205 MG/DL
FASTING STATUS PATIENT QL REPORTED: YES
HDLC SERPL-MCNC: 71 MG/DL
LDLC SERPL CALC-MCNC: 125 MG/DL
NONHDLC SERPL-MCNC: 134 MG/DL
TRIGL SERPL-MCNC: 45 MG/DL

## 2024-03-27 ENCOUNTER — OFFICE VISIT (OUTPATIENT)
Dept: FAMILY MEDICINE | Facility: CLINIC | Age: 59
End: 2024-03-27
Payer: COMMERCIAL

## 2024-03-27 VITALS
RESPIRATION RATE: 21 BRPM | BODY MASS INDEX: 23.87 KG/M2 | HEIGHT: 63 IN | OXYGEN SATURATION: 97 % | TEMPERATURE: 98.2 F | SYSTOLIC BLOOD PRESSURE: 128 MMHG | DIASTOLIC BLOOD PRESSURE: 60 MMHG | WEIGHT: 134.7 LBS | HEART RATE: 59 BPM

## 2024-03-27 DIAGNOSIS — F33.40 RECURRENT MAJOR DEPRESSIVE DISORDER, IN REMISSION (H): Primary | ICD-10-CM

## 2024-03-27 DIAGNOSIS — F43.21 GRIEF: ICD-10-CM

## 2024-03-27 DIAGNOSIS — E78.5 ELEVATED LIPIDS: ICD-10-CM

## 2024-03-27 PROCEDURE — 99214 OFFICE O/P EST MOD 30 MIN: CPT | Performed by: FAMILY MEDICINE

## 2024-03-27 ASSESSMENT — PAIN SCALES - GENERAL: PAINLEVEL: NO PAIN (0)

## 2024-03-27 NOTE — PROGRESS NOTES
Assessment & Plan       Menopausal syndrome (hot flashes)   Improved.  she stopped the patch for a couple of weeks to see how she would do without. Then had an interaction with a customer when she got very irritable. Decided to start the patch again and felt her mood and energy improve again.  She has  few hot flashes on the patch. She would like to remain on the same dose of estrogen patch for now. We will continue 0.05 mg estrogen patch.      Daily hot flashes, night sweats, insomnia. Has estrogen patches, but has not started yet. Discussed again today and she would like to try them.  I had actually prescription for the 0.05 mg patches. she will let me know if any concerns or if symptoms not controlled on low dose and I can adjust dose.  She will follow-up with me via telephone in 1 month.  She is status post hysterectomy-no need for endometrial protection with progesterone     Discussed risks/side effects/benefits of HT. Will start low dose estradiol patch 0.025mg twice weekly and follow up in a month and increase dose if needed at that time. Progestogen not needed as she has had a hysterectomy in 2016.  Gave resources and patient instructions as well     - estradiol (VIVELLE-DOT) 0.025 MG/24HR bi-weekly patch; Place 1 patch onto the skin twice a week  Dispense: 8 patch; Refill: 1     Urinary urgency   She has not tried vaginal estrogen yet, but may try in the future.       Possibly GSM  Will start vaginal estrogen- she has not started yet. Reviewed instructions today. See below.   Consider return to pelvic floor therapy.  Insurance would not cover estrogen cream.  Discussed she may see some benefit from the estradiol patch as well.     - estradiol (VAGIFEM) 10 MCG TABS vaginal tablet; Place 1 tablet (10 mcg) vaginally twice a week  Dispense: 24 tablet; Refill: 3  - estradiol (VAGIFEM) 10 MCG TABS vaginal tablet; Place 1 tablet (10 mcg) vaginally daily  Dispense: 18 tablet; Refill: 0         Major depressive  "disorder in remission(H)   She stopped lexapro on her own. Feels like she is doing well. G      Uncontrolled.  Grieving.  See notes above.  - escitalopram (LEXAPRO) 10 MG tablet; Take 1 tablet (10 mg) by mouth daily  Dispense: 90 tablet; Refill: 1          Grief    Slowly improving over time    Finds that she worries more about physical symptoms, wonders what is normal for her age and when to worry.   Mom passed away this year. Coping sometimes with alcohol, up to about 3 glasses of wine some nights, does not have alcohol every night. Prior to this, only had alcohol occasionally. She'd like to visit with Henry Edwards  again     Elevated lipids  Working on weight loss  She is doing nutritional weight and wellness 12 week program and has been losing weight and feels good. Plans to continue dietary changes on her own. Continues to remain off alcohol   I reviewed her lipids, A1c and glucose with her today. Lipids improved.                37 minutes spent on the date of the encounter doing chart review, history and exam, documentation and further activities per the note     BMI  Estimated body mass index is 23.86 kg/m  as calculated from the following:    Height as of this encounter: 1.6 m (5' 3\").    Weight as of this encounter: 61.1 kg (134 lb 11.2 oz).           Shani Granger is a 58 year old, presenting for the following health issues:  Labs Only (/03/2024)    History of Present Illness       Reason for visit:  Follow up on Labs    She eats 4 or more servings of fruits and vegetables daily.She consumes 0 sweetened beverage(s) daily.She exercises with enough effort to increase her heart rate 30 to 60 minutes per day.  She exercises with enough effort to increase her heart rate 5 days per week.   She is taking medications regularly.         7/11/2023    11:02 AM 9/27/2023     2:09 PM 11/8/2023    10:37 AM   PHQ   PHQ-9 Total Score 7 6 5   Q9: Thoughts of better off dead/self-harm past 2 weeks Not at all Not at all " "Not at all       Ivana Ellis RN         2/26/24  9:56 AM  Note  Dr. Leyva,     Pt is requesting order for A1C. She is in a nutrition program and has been working on eating healthier. Pt will be done with program at end of March. She would like to come in at that time and recheck her A1C with the Lipid panel.      Pt would like a call back from staff for order update status. Ok to left detail message on .     Ivana CERON RN  [facility]                  Objective    /60 (BP Location: Right arm, Patient Position: Sitting, Cuff Size: Adult Regular)   Pulse 59   Temp 98.2  F (36.8  C) (Temporal)   Resp 21   Ht 1.6 m (5' 3\")   Wt 61.1 kg (134 lb 11.2 oz)   LMP 06/11/2016   SpO2 97%   BMI 23.86 kg/m    Body mass index is 23.86 kg/m .  BP Readings from Last 6 Encounters:   03/27/24 128/60   02/11/24 124/89   07/11/23 (!) 146/88   07/19/21 120/77   03/31/21 133/77   09/12/19 112/58     Wt Readings from Last 5 Encounters:   03/27/24 61.1 kg (134 lb 11.2 oz)   02/11/24 67.1 kg (148 lb)   07/11/23 64.4 kg (142 lb 0.8 oz)   07/19/21 64.4 kg (142 lb)   09/12/19 59 kg (130 lb)      Physical Exam       Lab on 03/21/2024   Component Date Value Ref Range Status    Cholesterol 03/21/2024 205 (H)  <200 mg/dL Final    Triglycerides 03/21/2024 45  <150 mg/dL Final    Direct Measure HDL 03/21/2024 71  >=50 mg/dL Final    LDL Cholesterol Calculated 03/21/2024 125 (H)  <=100 mg/dL Final    Non HDL Cholesterol 03/21/2024 134 (H)  <130 mg/dL Final    Patient Fasting > 8hrs? 03/21/2024 Yes   Final    Hemoglobin A1C 03/21/2024 5.3  0.0 - 5.6 % Final    Normal <5.7%   Prediabetes 5.7-6.4%    Diabetes 6.5% or higher     Note: Adopted from ADA consensus guidelines.     The 10-year ASCVD risk score (Loyd LOGAN, et al., 2019) is: 2%    Values used to calculate the score:      Age: 58 years      Sex: Female      Is Non- : No      Diabetic: No      Tobacco smoker: No      Systolic " Blood Pressure: 128 mmHg      Is BP treated: No      HDL Cholesterol: 71 mg/dL      Total Cholesterol: 205 mg/dL             Signed Electronically by: Brooklynn Leyva MD

## 2024-04-05 ENCOUNTER — TELEPHONE (OUTPATIENT)
Dept: FAMILY MEDICINE | Facility: CLINIC | Age: 59
End: 2024-04-05
Payer: COMMERCIAL

## 2024-04-05 NOTE — TELEPHONE ENCOUNTER
Spoke with patient.  She had interpreted the instructions as to place a new patch every two weeks after an initial period of time, and reassured her that she is now correctly placing a new patch twice per week.      Pt describes an episode of urinary incontinence while walking her dog last week that was large enough that she had to wash her shoes.  She is now considering the vaginal estrogen tablet and has a telephone visit scheduled with  next week.    Margot CERON RN  [facility]

## 2024-04-05 NOTE — TELEPHONE ENCOUNTER
General Call    Contacts         Type Contact Phone/Fax    04/05/2024 10:24 AM CDT Phone (Incoming) Bárbara Askew (Self) [phone] (M)          Reason for Call:  hormone patch - menopause    What are your questions or concerns:  Patient is on low dose estrogen patch. She has been using the patch incorrectly. She was changing it too often without realizing it. She talked about weaning off of them with Dr. Leyva, but she would like a little more guidance.     Date of last appointment with provider: 3/27/2024    Could we send this information to you in [employer] or would you prefer to receive a phone call?:   Patient would prefer a phone call   Okay to leave a detailed message?: Yes at Home number on file [phone] (home)

## 2024-04-22 ENCOUNTER — TELEPHONE (OUTPATIENT)
Dept: OPHTHALMOLOGY | Facility: CLINIC | Age: 59
End: 2024-04-22
Payer: COMMERCIAL

## 2024-04-22 NOTE — TELEPHONE ENCOUNTER
[facility]    Phone Message    May a detailed message be left on voicemail: yes     Reason for Call: Symptoms or Concerns     If patient has red-flag symptoms, warm transfer to triage line    Current symptom or concern: Pt states that late last week she started experiencing new flashes of light in the Lt eye. She also states that she sees what looks like a hair in her eye. Preferred locations are [place], [place], or [place].    Symptoms have been present for:  Few day(s)    Has patient previously been seen for this? No    By : N/A    Date: N/A    Are there any new or worsening symptoms? Yes: New flashers in the Lt eye.    Action Taken: Message routed to:  [facility]    Travel Screening: Not Applicable

## 2024-04-30 ENCOUNTER — TRANSFERRED RECORDS (OUTPATIENT)
Dept: HEALTH INFORMATION MANAGEMENT | Facility: CLINIC | Age: 59
End: 2024-04-30
Payer: COMMERCIAL

## 2024-05-02 ENCOUNTER — TELEPHONE (OUTPATIENT)
Dept: FAMILY MEDICINE | Facility: CLINIC | Age: 59
End: 2024-05-02
Payer: COMMERCIAL

## 2024-05-02 ENCOUNTER — TELEPHONE (OUTPATIENT)
Dept: GASTROENTEROLOGY | Facility: CLINIC | Age: 59
End: 2024-05-02
Payer: COMMERCIAL

## 2024-05-02 ENCOUNTER — HOSPITAL ENCOUNTER (OUTPATIENT)
Facility: AMBULATORY SURGERY CENTER | Age: 59
End: 2024-05-02
Attending: STUDENT IN AN ORGANIZED HEALTH CARE EDUCATION/TRAINING PROGRAM
Payer: COMMERCIAL

## 2024-05-02 DIAGNOSIS — Z12.11 SCREENING FOR COLON CANCER: Primary | ICD-10-CM

## 2024-05-02 NOTE — TELEPHONE ENCOUNTER
Order/Referral Request    Who is requesting: Pt     Orders being requested: Referral for colonoscopy     Reason service is needed/diagnosis: N/A    When are orders needed by: ASAP -- referral already in chart, but pt could not schedule until July 26th and the referral expires before then. Pt just needs a new one written that extends date.     Has this been discussed with Provider: Yes    Does patient have a preference on a Group/Provider/Facility? [facility]     Does patient have an appointment scheduled?: Yes: July 26th, 2024    Where to send orders: Place orders within Epic    Could we send this information to you in INXPO or would you prefer to receive a phone call?:   Patient would like to be contacted via INXPO

## 2024-05-02 NOTE — TELEPHONE ENCOUNTER
"Endoscopy Scheduling Screen    Have you had a positive Covid test in the last 14 days?  No    What is your communication preference for Instructions and/or Bowel Prep?   MyChart    What insurance is in the chart?  Other:  [facility]    Ordering/Referring Provider: Brooklynn Leyva MD     (If ordering provider performs procedure, schedule with ordering provider unless otherwise instructed. )    BMI: Estimated body mass index is 23.86 kg/m  as calculated from the following:    Height as of 3/27/24: 1.6 m (5' 3\").    Weight as of 3/27/24: 61.1 kg (134 lb 11.2 oz).     Sedation Ordered  moderate sedation.   If patient BMI > 50 do not schedule in ASC.    If patient BMI > 45 do not schedule at [facility].    Are you taking methadone or Suboxone?  No    Have you had difficulties, pain, or discomfort during past endoscopy procedures?  No    Are you taking any prescription medications for pain 3 or more times per week?   NO, No RN review required.    Do you have a history of malignant hyperthermia?  No    (Females) Are you currently pregnant?   No     Have you been diagnosed or told you have pulmonary hypertension?   No    Do you have an LVAD?  No    Have you been told you have moderate to severe sleep apnea?  No    Have you been told you have COPD, asthma, or any other lung disease?  No    Do you have any heart conditions?  No     Have you ever had or are you waiting for an organ transplant?  No. Continue scheduling, no site restrictions.    Have you had a stroke or transient ischemic attack (TIA aka \"mini stroke\" in the last 6 months?   No    Have you been diagnosed with or been told you have cirrhosis of the liver?   No    Are you currently on dialysis?   No    Do you need assistance transferring?   No    BMI: Estimated body mass index is 23.86 kg/m  as calculated from the following:    Height as of 3/27/24: 1.6 m (5' 3\").    Weight as of 3/27/24: 61.1 kg (134 lb 11.2 oz).     Is patients BMI > 40 and scheduling location " [facility]?  No    Do you take an injectable medication for weight loss or diabetes (excluding insulin)?  No    Do you take the medication Naltrexone?  No    Do you take blood thinners?  No       Prep   Are you currently on dialysis or do you have chronic kidney disease?  No    Do you have a diagnosis of diabetes?  No    Do you have a diagnosis of cystic fibrosis (CF)?  No    On a regular basis do you go 3 -5 days between bowel movements?  No    BMI > 40?  No    Preferred Pharmacy:      [facility] - [place], [place] - [address] [place] [address]  Phone: [phone] Fax: [phone]      Final Scheduling Details     Procedure scheduled  Colonoscopy    Surgeon:      Date of procedure:  7/26     Pre-OP / PAC:   No - Not required for this site.    Location  [facility] - [facility] - Patient preference.    Sedation   Moderate Sedation - Per order.      Patient Reminders:   You will receive a call from a Nurse to review instructions and health history.  This assessment must be completed prior to your procedure.  Failure to complete the Nurse assessment may result in the procedure being cancelled.      On the day of your procedure, please designate an adult(s) who can drive you home stay with you for the next 24 hours. The medicines used in the exam will make you sleepy. You will not be able to drive.      You cannot take public transportation, ride share services, or non-medical taxi service without a responsible caregiver.  Medical transport services are allowed with the requirement that a responsible caregiver will receive you at your destination.  We require that drivers and caregivers are confirmed prior to your procedure.

## 2024-05-16 ENCOUNTER — PATIENT OUTREACH (OUTPATIENT)
Dept: CARE COORDINATION | Facility: CLINIC | Age: 59
End: 2024-05-16
Payer: COMMERCIAL

## 2024-05-17 ENCOUNTER — TRANSFERRED RECORDS (OUTPATIENT)
Dept: HEALTH INFORMATION MANAGEMENT | Facility: CLINIC | Age: 59
End: 2024-05-17
Payer: COMMERCIAL

## 2024-05-24 ENCOUNTER — TELEPHONE (OUTPATIENT)
Dept: FAMILY MEDICINE | Facility: CLINIC | Age: 59
End: 2024-05-24
Payer: COMMERCIAL

## 2024-05-24 DIAGNOSIS — M25.551 HIP PAIN, RIGHT: Primary | ICD-10-CM

## 2024-05-24 NOTE — TELEPHONE ENCOUNTER
Dr. Leyva: pended referral    Margot CERON, BSN, PHN, RN  [facility]  [phone]

## 2024-05-24 NOTE — TELEPHONE ENCOUNTER
Pt has been seeing [facility] but would like to see a physical therapist within [facility] instead. [facility] has already faxed a referral for pt. Not sure if PCP would like to make a referral or just use [facility] referral. Please call and advise pt, thanks!

## 2024-05-24 NOTE — TELEPHONE ENCOUNTER
Order/Referral Request    Who is requesting: Pt    Orders being requested: Physical Therapy    Reason service is needed/diagnosis: Hip pain    When are orders needed by: ASAP    Has this been discussed with Provider: NA    Does patient have a preference on a Group/Provider/Facility? No    Does patient have an appointment scheduled?: No    Where to send orders: Place orders within Epic    Could we send this information to you in [facility] or would you prefer to receive a phone call?:   Patient would prefer a phone call   Okay to leave a detailed message?: Yes at Home number on file [phone] (home)

## 2024-05-28 NOTE — TELEPHONE ENCOUNTER
Referral signed. Brooklynn Leyva M.D.       Debridement Text: The wound edges were debrided prior to proceeding with the closure to facilitate wound healing.

## 2024-05-28 NOTE — TELEPHONE ENCOUNTER
Left VM that referral requested was sent and gave # to schedule    LAI FregosoN RN  [facility]

## 2024-07-11 ENCOUNTER — TELEPHONE (OUTPATIENT)
Dept: GASTROENTEROLOGY | Facility: CLINIC | Age: 59
End: 2024-07-11
Payer: COMMERCIAL

## 2024-07-11 SDOH — HEALTH STABILITY: PHYSICAL HEALTH: ON AVERAGE, HOW MANY DAYS PER WEEK DO YOU ENGAGE IN MODERATE TO STRENUOUS EXERCISE (LIKE A BRISK WALK)?: 7 DAYS

## 2024-07-11 SDOH — HEALTH STABILITY: PHYSICAL HEALTH: ON AVERAGE, HOW MANY MINUTES DO YOU ENGAGE IN EXERCISE AT THIS LEVEL?: 60 MIN

## 2024-07-11 ASSESSMENT — SOCIAL DETERMINANTS OF HEALTH (SDOH): HOW OFTEN DO YOU GET TOGETHER WITH FRIENDS OR RELATIVES?: ONCE A WEEK

## 2024-07-11 NOTE — TELEPHONE ENCOUNTER
Caller:     Reason for Reschedule/Cancellation   (please be detailed, any staff messages or encounters to note?): WORK      Prior to reschedule please review:  Ordering Provider:     PATIENCE TRIMBLE     Sedation Determined: CS  Does patient have any ASC Exclusions, please identify?:       Notes on Cancelled Procedure:  Procedure: Lower Endoscopy [Colonoscopy]   Date: 7/26  Location: [facility]; [address] [place] [address], [address] [place] [address]  Surgeon:       Rescheduled: Yes,   Procedure: Lower Endoscopy [Colonoscopy]    Date: 9/5   Location: [facility]; [address] [place] [address], [address] [place] [address]   Surgeon:    Sedation Level Scheduled  CS ,  Reason for Sedation Level ORDER   Instructions updated and sent: Y     Does patient need PAC or Pre -Op Rescheduled? : N       Did you cancel or rescheduled an EUS procedure? No.

## 2024-07-16 ENCOUNTER — OFFICE VISIT (OUTPATIENT)
Dept: FAMILY MEDICINE | Facility: CLINIC | Age: 59
End: 2024-07-16
Payer: COMMERCIAL

## 2024-07-16 VITALS
DIASTOLIC BLOOD PRESSURE: 76 MMHG | SYSTOLIC BLOOD PRESSURE: 118 MMHG | HEART RATE: 58 BPM | WEIGHT: 131.5 LBS | HEIGHT: 63 IN | TEMPERATURE: 97 F | OXYGEN SATURATION: 98 % | RESPIRATION RATE: 14 BRPM | BODY MASS INDEX: 23.3 KG/M2

## 2024-07-16 DIAGNOSIS — Z11.59 NEED FOR HEPATITIS B SCREENING TEST: ICD-10-CM

## 2024-07-16 DIAGNOSIS — N39.41 URGE INCONTINENCE: ICD-10-CM

## 2024-07-16 DIAGNOSIS — N95.1 VASOMOTOR SYMPTOMS DUE TO MENOPAUSE: ICD-10-CM

## 2024-07-16 DIAGNOSIS — Z12.31 VISIT FOR SCREENING MAMMOGRAM: ICD-10-CM

## 2024-07-16 DIAGNOSIS — Z12.4 CERVICAL CANCER SCREENING: ICD-10-CM

## 2024-07-16 DIAGNOSIS — Z11.59 NEED FOR HEPATITIS C SCREENING TEST: ICD-10-CM

## 2024-07-16 DIAGNOSIS — Z00.00 ROUTINE GENERAL MEDICAL EXAMINATION AT A HEALTH CARE FACILITY: Primary | ICD-10-CM

## 2024-07-16 DIAGNOSIS — F41.1 GAD (GENERALIZED ANXIETY DISORDER): ICD-10-CM

## 2024-07-16 DIAGNOSIS — F33.42 RECURRENT MAJOR DEPRESSIVE DISORDER, IN FULL REMISSION (H): ICD-10-CM

## 2024-07-16 LAB
HBV CORE AB SERPL QL IA: NONREACTIVE
HBV SURFACE AB SERPL IA-ACNC: <3.5 M[IU]/ML
HBV SURFACE AB SERPL IA-ACNC: NONREACTIVE M[IU]/ML
HBV SURFACE AG SERPL QL IA: NONREACTIVE
HCV AB SERPL QL IA: NONREACTIVE

## 2024-07-16 PROCEDURE — 99213 OFFICE O/P EST LOW 20 MIN: CPT | Mod: 25 | Performed by: FAMILY MEDICINE

## 2024-07-16 PROCEDURE — 87340 HEPATITIS B SURFACE AG IA: CPT | Performed by: FAMILY MEDICINE

## 2024-07-16 PROCEDURE — 87624 HPV HI-RISK TYP POOLED RSLT: CPT | Performed by: FAMILY MEDICINE

## 2024-07-16 PROCEDURE — 86706 HEP B SURFACE ANTIBODY: CPT | Performed by: FAMILY MEDICINE

## 2024-07-16 PROCEDURE — 86704 HEP B CORE ANTIBODY TOTAL: CPT | Performed by: FAMILY MEDICINE

## 2024-07-16 PROCEDURE — 86803 HEPATITIS C AB TEST: CPT | Performed by: FAMILY MEDICINE

## 2024-07-16 PROCEDURE — 36415 COLL VENOUS BLD VENIPUNCTURE: CPT | Performed by: FAMILY MEDICINE

## 2024-07-16 PROCEDURE — 99396 PREV VISIT EST AGE 40-64: CPT | Performed by: FAMILY MEDICINE

## 2024-07-16 PROCEDURE — G0145 SCR C/V CYTO,THINLAYER,RESCR: HCPCS | Performed by: FAMILY MEDICINE

## 2024-07-16 RX ORDER — ESTRADIOL 0.05 MG/D
1 PATCH, EXTENDED RELEASE TRANSDERMAL
Qty: 24 PATCH | Refills: 3 | Status: SHIPPED | OUTPATIENT
Start: 2024-07-18

## 2024-07-16 SDOH — HEALTH STABILITY: PHYSICAL HEALTH: ON AVERAGE, HOW MANY DAYS PER WEEK DO YOU ENGAGE IN MODERATE TO STRENUOUS EXERCISE (LIKE A BRISK WALK)?: 7 DAYS

## 2024-07-16 SDOH — HEALTH STABILITY: PHYSICAL HEALTH: ON AVERAGE, HOW MANY MINUTES DO YOU ENGAGE IN EXERCISE AT THIS LEVEL?: 60 MIN

## 2024-07-16 ASSESSMENT — PATIENT HEALTH QUESTIONNAIRE - PHQ9
10. IF YOU CHECKED OFF ANY PROBLEMS, HOW DIFFICULT HAVE THESE PROBLEMS MADE IT FOR YOU TO DO YOUR WORK, TAKE CARE OF THINGS AT HOME, OR GET ALONG WITH OTHER PEOPLE: SOMEWHAT DIFFICULT
SUM OF ALL RESPONSES TO PHQ QUESTIONS 1-9: 5
SUM OF ALL RESPONSES TO PHQ QUESTIONS 1-9: 5

## 2024-07-16 ASSESSMENT — SOCIAL DETERMINANTS OF HEALTH (SDOH): HOW OFTEN DO YOU GET TOGETHER WITH FRIENDS OR RELATIVES?: ONCE A WEEK

## 2024-07-16 ASSESSMENT — PAIN SCALES - GENERAL: PAINLEVEL: SEVERE PAIN (7)

## 2024-07-16 NOTE — PROGRESS NOTES
Preventive Care Visit  [facility]  Brooklynn Leyva MD,  Family Medicine  Jul 16, 2024      Assessment & Plan       ICD-10-CM    1. Routine general medical examination at a health care facility  Z00.00 Pap Screen with HPV - Recommended Age 30 - 65 Years      2. Need for hepatitis C screening test  Z11.59 Hepatitis C Screen Reflex to HCV RNA Quant and Genotype      3. Visit for screening mammogram  Z12.31 MA Screen Bilateral w/Terrell      4. Cervical cancer screening  Z12.4       5. WALLACE (generalized anxiety disorder)  F41.1       6. Recurrent major depressive disorder, in full remission (H24)  F33.42       7. Urge incontinence  N39.41       8. Vitamin D deficiency  E55.9       9. Vasomotor symptoms due to menopause  N95.1 estradiol (VIVELLE-DOT) 0.05 MG/24HR bi-weekly patch      10. Need for hepatitis B screening test  Z11.59 Hepatitis B core antibody     Hepatitis B surface antigen     Hepatitis B Surface Antibody         Routine preventive visit  Healthy   Colon cancer screening due - she has colonoscopy scheduled in September  Mammogram completed 11/18/22 and she had a biopsy on 12/2/22 negative for malignancy, repeat mammo was due in 12/23. She will schedule  Pap with HPV cotesting completed today. She is s/p supracervical hysterectomy bilateral salpingectomy in 2016   Hep C and Hep B screening recommended -she will do the labs if she has time today     Menopausal syndrome (hot flashes)   Improved.  We will continue 0.05 mg estrogen twice weekly patch.  She has  few hot flashes on the patch. She would like to remain on the same dose of estrogen patch for now. refilled for the year.      Daily hot flashes, night sweats, insomnia. Has estrogen patches, but has not started yet. Discussed again today and she would like to try them.  I had actually prescription for the 0.05 mg patches. she will let me know if any concerns or if symptoms not controlled on low dose and I can adjust dose.  She  will follow-up with me via telephone in 1 month.  She is status post hysterectomy-no need for endometrial protection with progesterone     Discussed risks/side effects/benefits of HT. Will start low dose estradiol patch 0.025mg twice weekly and follow up in a month and increase dose if needed at that time. Progestogen not needed as she has had a hysterectomy in 2016.  Gave resources and patient instructions as well     - estradiol (VIVELLE-DOT) 0.025 MG/24HR bi-weekly patch; Place 1 patch onto the skin twice a week  Dispense: 8 patch; Refill: 1     Urinary urgency - no complaints today    She has not tried vaginal estrogen tablets yet, but may try in the future.       Possibly [facility]  Will start vaginal estrogen- she has not started yet. Reviewed instructions today. See below.   Consider return to pelvic floor therapy.  Insurance would not cover estrogen cream.  Discussed she may see some benefit from the estradiol patch as well.     - estradiol (VAGIFEM) 10 MCG TABS vaginal tablet; Place 1 tablet (10 mcg) vaginally twice a week  Dispense: 24 tablet; Refill: 3  - estradiol (VAGIFEM) 10 MCG TABS vaginal tablet; Place 1 tablet (10 mcg) vaginally daily  Dispense: 18 tablet; Refill: 0         Major depressive disorder in remission(H)   WALLACE  She remains off lexapro. Feels like she is doing well. Continues to grieve the loss of her mom last year, but improving.      Uncontrolled.  Grieving.  See notes above.  - escitalopram (LEXAPRO) 10 MG tablet; Take 1 tablet (10 mg) by mouth daily  Dispense: 90 tablet; Refill: 1          Grief    Slowly improving over time     Finds that she worries more about physical symptoms, wonders what is normal for her age and when to worry.   Mom passed away this year. Coping sometimes with alcohol, up to about 3 glasses of wine some nights, does not have alcohol every night. Prior to this, only had alcohol occasionally. She'd like to visit with [facility] Edwards  again     Elevated lipids  Working on  "weight loss  Doing great! She is doing nutritional weight and wellness 12 week program and has been losing weight and feels good. Plans to continue dietary changes on her own. Continues to remain off alcohol         Counseling  Appropriate preventive services were discussed with this patient, including applicable screening as appropriate for fall prevention, nutrition, physical activity, Tobacco-use cessation, weight loss and cognition.  Checklist reviewing preventive services available has been given to the patient.  Reviewed patient's diet, addressing concerns and/or questions.   The patient was instructed to see the dentist every 6 months.   The patient's PHQ-9 score is consistent with mild depression. She was provided with information regarding depression.          Shani Granger is a 59 year old, presenting for the following:  Physical and Gyn Exam        7/16/2024     7:59 AM   Additional Questions   Roomed by Dominik Anderson   Accompanied by Self        Health Care Directive  Patient does not have a Health Care Directive or Living Will: Discussed advance care planning with patient; however, patient declined at this time.    HPI      Has been doing well with lifestyle changes and continues to lose weight.   \"Saturday I totally blew out my hip\". She was walking through mud and right hip made a popping sound and sensation and she almost went to [facility] urgent care, but did not. Started doing ibuprofen and tylenol. That helped a lot. It is different from her different hip pain sh was having earlier this year. She called ortho. If it is pulled muscles, she is going to just let it heal itself for now. Biked here and that was fine. Just tender along the right side.     Feels like she is aging quickly. Wrinkles and injuries are happening more quickly she feels like. Has been so athletic her whole life. She is using the estrogen patch, but this morning she had a massive hot flash and has not had one that significant in so " long.     She had colonoscopy scheduled, but could not deal with the prep at the time, then she cancelled and rescheduled in September.         7/16/2024   General Health   How would you rate your overall physical health? Good   Feel stress (tense, anxious, or unable to sleep) To some extent      (!) STRESS CONCERN      7/16/2024   Nutrition   Three or more servings of calcium each day? Yes   Diet: Regular (no restrictions)   How many servings of fruit and vegetables per day? 4 or more   How many sweetened beverages each day? 0-1            7/16/2024   Exercise   Days per week of moderate/strenous exercise 7 days   Average minutes spent exercising at this level 60 min            7/16/2024   Social Factors   Frequency of gathering with friends or relatives Once a week   Worry food won't last until get money to buy more No   Food not last or not have enough money for food? No   Do you have housing? (Housing is defined as stable permanent housing and does not include staying ouside in a car, in a tent, in an abandoned building, in an overnight shelter, or couch-surfing.) Yes   Are you worried about losing your housing? No   Lack of transportation? No   Unable to get utilities (heat,electricity)? No            7/16/2024   Fall Risk   Fallen 2 or more times in the past year? No   Trouble with walking or balance? No             7/16/2024   Dental   Dentist two times every year? (!) NO             Today's PHQ-9 Score:       7/16/2024     7:46 AM   PHQ-9 SCORE   PHQ-9 Total Score MyChart 5 (Mild depression)   PHQ-9 Total Score 5         7/16/2024   Substance Use   Alcohol more than 3/day or more than 7/wk No   Do you use any other substances recreationally? No        Social History     Tobacco Use    Smoking status: Never     Passive exposure: Past    Smokeless tobacco: Never   Vaping Use    Vaping status: Never Used   Substance Use Topics    Alcohol use: Yes     Comment: 5 drinks per week    Drug use: No            7/10/2023   LAST FHS-7 RESULTS   1st degree relative breast or ovarian cancer Yes   Any relative bilateral breast cancer No   Any male have breast cancer No   Any ONE woman have BOTH breast AND ovarian cancer Yes   Any woman with breast cancer before 50yrs No   2 or more relatives with breast AND/OR ovarian cancer No   2 or more relatives with breast AND/OR bowel cancer No           Mammogram Screening - Mammogram every 1-2 years updated in Health Maintenance based on mutual decision making        7/16/2024   STI Screening   New sexual partner(s) since last STI/HIV test? No        History of abnormal Pap smear: No - age 30- 64 PAP with HPV every 5 years recommended        Latest Ref Rng & Units 3/28/2019     3:53 PM 3/28/2019     3:08 PM 3/16/2016    12:00 AM   PAP / HPV   PAP (Historical)   NIL  NIL    HPV 16 DNA NEG^Negative Negative      HPV 18 DNA NEG^Negative Negative      Other HR HPV NEG^Negative Negative        ASCVD Risk   The 10-year ASCVD risk score (oLyd LOGAN, et al., 2019) is: 1.9%    Values used to calculate the score:      Age: 59 years      Sex: Female      Is Non- : No      Diabetic: No      Tobacco smoker: No      Systolic Blood Pressure: 118 mmHg      Is BP treated: No      HDL Cholesterol: 71 mg/dL      Total Cholesterol: 205 mg/dL                        Past Medical History:   Diagnosis Date    Arthritis Inside of left knee near my meniscus injury    Closed fracture of unspecified bone 1990    left fifth metacarpal fracture - cast    Depressive disorder Alway    from birth    Heart murmur     Leukopenia     2010 and 2014    Leukopenia     Major depression      Past Surgical History:   Procedure Laterality Date    ABDOMEN SURGERY  6.15.16    Hysterectomy    BIOPSY  5/9/16    HYSTERECTOMY, SUPRACERVICAL LAPAROSCOPIC  6/15/16    LAPAROSCOPIC HYSTERECTOMY TOTAL, SALPINGECTOMY BILATERAL N/A 6/15/2016    Procedure: LAPAROSCOPIC HYSTERECTOMY TOTAL, SALPINGECTOMY  "BILATERAL;  Surgeon: Kavita Manley MD;  Location: [facility]    LAPAROSCOPIC SALPINGECTOMY Bilateral 6/15/16            Objective    Exam  /76 (BP Location: Right arm, Patient Position: Sitting, Cuff Size: Adult Regular)   Pulse 58   Temp 97  F (36.1  C) (Temporal)   Resp 14   Ht 1.6 m (5' 3\")   Wt 59.6 kg (131 lb 8 oz)   LMP 06/11/2016   SpO2 98%   Breastfeeding No   BMI 23.29 kg/m     Estimated body mass index is 23.29 kg/m  as calculated from the following:    Height as of this encounter: 1.6 m (5' 3\").    Weight as of this encounter: 59.6 kg (131 lb 8 oz).  Wt Readings from Last 5 Encounters:   07/16/24 59.6 kg (131 lb 8 oz)   03/27/24 61.1 kg (134 lb 11.2 oz)   02/11/24 67.1 kg (148 lb)   07/11/23 64.4 kg (142 lb 0.8 oz)   07/19/21 64.4 kg (142 lb)      Physical Exam  GENERAL: alert and no distress  EYES: Eyes grossly normal to inspection, PERRL and conjunctivae and sclerae normal  HENT: ear canals and TM's normal, nose and mouth without ulcers or lesions  NECK: no adenopathy, no asymmetry, masses, or scars  RESP: lungs clear to auscultation - no rales, rhonchi or wheezes  CV: regular rate and rhythm, normal S1 S2, no S3 or S4, no murmur, click or rub, no peripheral edema  ABDOMEN: soft, nontender, no hepatosplenomegaly, no masses and bowel sounds normal   (female) : normal female external genitalia, normal urethral meatus, normal vaginal mucosa, and normal cervix    MS: no gross musculoskeletal defects noted, no edema  SKIN: no suspicious lesions or rashes  NEURO: Normal strength and tone, mentation intact and speech normal  PSYCH: mentation appears normal, affect normal/bright        Signed Electronically by: Brooklynn Leyva MD     Answers submitted by the patient for this visit:  Patient Health Questionnaire (Submitted on 7/16/2024)  If you checked off any problems, how difficult have these problems made it for you to do your work, take care of things at home, or get along with other " people?: Somewhat difficult  PHQ9 TOTAL SCORE: 5

## 2024-07-16 NOTE — PATIENT INSTRUCTIONS
Patient Education   Schedule a mammogram.    Preventive Care Advice   This is general advice given by our system to help you stay healthy. However, your care team may have specific advice just for you. Please talk to your care team about your preventive care needs.  Nutrition  Eat 5 or more servings of fruits and vegetables each day.  Try wheat bread, brown rice and whole grain pasta (instead of white bread, rice, and pasta).  Get enough calcium and vitamin D. Check the label on foods and aim for 100% of the RDA (recommended daily allowance).  Lifestyle  Exercise at least 150 minutes each week  (30 minutes a day, 5 days a week).  Do muscle strengthening activities 2 days a week. These help control your weight and prevent disease.  No smoking.  Wear sunscreen to prevent skin cancer.  Have a dental exam and cleaning every 6 months.  Yearly exams  See your health care team every year to talk about:  Any changes in your health.  Any medicines your care team has prescribed.  Preventive care, family planning, and ways to prevent chronic diseases.  Shots (vaccines)   HPV shots (up to age 26), if you've never had them before.  Hepatitis B shots (up to age 59), if you've never had them before.  COVID-19 shot: Get this shot when it's due.  Flu shot: Get a flu shot every year.  Tetanus shot: Get a tetanus shot every 10 years.  Pneumococcal, hepatitis A, and RSV shots: Ask your care team if you need these based on your risk.  Shingles shot (for age 50 and up)  General health tests  Diabetes screening:  Starting at age 35, Get screened for diabetes at least every 3 years.  If you are younger than age 35, ask your care team if you should be screened for diabetes.  Cholesterol test: At age 39, start having a cholesterol test every 5 years, or more often if advised.  Bone density scan (DEXA): At age 50, ask your care team if you should have this scan for osteoporosis (brittle bones).  Hepatitis C: Get tested at least once in your  life.  STIs (sexually transmitted infections)  Before age 24: Ask your care team if you should be screened for STIs.  After age 24: Get screened for STIs if you're at risk. You are at risk for STIs (including HIV) if:  You are sexually active with more than one person.  You don't use condoms every time.  You or a partner was diagnosed with a sexually transmitted infection.  If you are at risk for HIV, ask about PrEP medicine to prevent HIV.  Get tested for HIV at least once in your life, whether you are at risk for HIV or not.  Cancer screening tests  Cervical cancer screening: If you have a cervix, begin getting regular cervical cancer screening tests starting at age 21.  Breast cancer scan (mammogram): If you've ever had breasts, begin having regular mammograms starting at age 40. This is a scan to check for breast cancer.  Colon cancer screening: It is important to start screening for colon cancer at age 45.  Have a colonoscopy test every 10 years (or more often if you're at risk) Or, ask your provider about stool tests like a FIT test every year or Cologuard test every 3 years.  To learn more about your testing options, visit:   .  For help making a decision, visit:   https://bit.ly/xh92839.  Prostate cancer screening test: If you have a prostate, ask your care team if a prostate cancer screening test (PSA) at age 55 is right for you.  Lung cancer screening: If you are a current or former smoker ages 50 to 80, ask your care team if ongoing lung cancer screenings are right for you.  For informational purposes only. Not to replace the advice of your health care provider. Copyright   2023 [facility]. All rights reserved. Clinically reviewed by the [facility]. [employer] 379280 - REV 01/24.  Learning About Depression Screening  What is depression screening?  Depression screening is a way to see if you have depression symptoms. It may be done by a doctor or counselor. It's  "often part of a routine checkup. That's because your mental health is just as important as your physical health.  Depression is a mental health condition that affects how you feel, think, and act. You may:  Have less energy.  Lose interest in your daily activities.  Feel sad and grouchy for a long time.  Depression is very common. It affects people of all ages.  Many things can lead to depression. Some people become depressed after they have a stroke or find out they have a major illness like cancer or heart disease. The death of a loved one or a breakup may lead to depression. It can run in families. Most experts believe that a combination of inherited genes and stressful life events can cause it.  What happens during screening?  You may be asked to fill out a form about your depression symptoms. You and the doctor will discuss your answers. The doctor may ask you more questions to learn more about how you think, act, and feel.  What happens after screening?  If you have symptoms of depression, your doctor will talk to you about your options.  Doctors usually treat depression with medicines or counseling. Often, combining the two works best. Many people don't get help because they think that they'll get over the depression on their own. But people with depression may not get better unless they get treatment.  The cause of depression is not well understood. There may be many factors involved. But if you have depression, it's not your fault.  A serious symptom of depression is thinking about death or suicide. If you or someone you care about talks about this or about feeling hopeless, get help right away.  It's important to know that depression can be treated. Medicine, counseling, and self-care may help.  Where can you learn more?  Go to https://www.healthwise.net/patiented  Enter T185 in the search box to learn more about \"Learning About Depression Screening.\"  Current as of: June 24, 2023               Content " Version: 14.0    0811-5148 [employer].   Care instructions adapted under license by your healthcare professional. If you have questions about a medical condition or this instruction, always ask your healthcare professional. [employer] disclaims any warranty or liability for your use of this information.

## 2024-07-17 ENCOUNTER — TRANSFERRED RECORDS (OUTPATIENT)
Dept: HEALTH INFORMATION MANAGEMENT | Facility: CLINIC | Age: 59
End: 2024-07-17
Payer: COMMERCIAL

## 2024-07-17 LAB
HPV HR 12 DNA CVX QL NAA+PROBE: NEGATIVE
HPV16 DNA CVX QL NAA+PROBE: NEGATIVE
HPV18 DNA CVX QL NAA+PROBE: NEGATIVE
HUMAN PAPILLOMA VIRUS FINAL DIAGNOSIS: NORMAL

## 2024-07-19 LAB
BKR LAB AP GYN ADEQUACY: NORMAL
BKR LAB AP GYN INTERPRETATION: NORMAL
BKR LAB AP PREVIOUS ABNORMAL: NORMAL
PATH REPORT.COMMENTS IMP SPEC: NORMAL
PATH REPORT.COMMENTS IMP SPEC: NORMAL
PATH REPORT.RELEVANT HX SPEC: NORMAL

## 2024-08-07 ASSESSMENT — ACTIVITIES OF DAILY LIVING (ADL)
WALKING_15_MINUTES_OR_GREATER: SLIGHT DIFFICULTY
WALKING_UP_STEEP_HILLS: NO DIFFICULTY AT ALL
STANDING FOR 15 MINUTES: NO DIFFICULTY AT ALL
TWISTING/PIVOTING ON INVOLVED LEG: MODERATE DIFFICULTY
GETTING_INTO_AND_OUT_OF_AN_AVERAGE_CAR: NO DIFFICULTY AT ALL
GETTING_INTO_AND_OUT_OF_A_BATHTUB: NO DIFFICULTY AT ALL
GETTING_INTO_AND_OUT_OF_A_BATHTUB: NO DIFFICULTY AT ALL
GETTING INTO AND OUT OF AN AVERAGE CAR: NO DIFFICULTY AT ALL
HOW_WOULD_YOU_RATE_YOUR_CURRENT_LEVEL_OF_FUNCTION?: ABNORMAL
WALKING_INITIALLY: SLIGHT DIFFICULTY
GOING UP 1 FLIGHT OF STAIRS: SLIGHT DIFFICULTY
ADL_SCORE(%): 0
DEEP_SQUATTING: SLIGHT DIFFICULTY
WALKING_15_MINUTES_OR_GREATER: SLIGHT DIFFICULTY
LIGHT_TO_MODERATE_WORK: MODERATE DIFFICULTY
WALKING_FOR_APPROXIMATELY_10_MINUTES: SLIGHT DIFFICULTY
ADL_TOTAL_ITEM_SCORE: 0
HEAVY_WORK: MODERATE DIFFICULTY
SITTING_FOR_15_MINUTES: SLIGHT DIFFICULTY
SPORTS_HIGHEST_POTENTIAL_SCORE: 36
WALKING_DOWN_STEEP_HILLS: SLIGHT DIFFICULTY
HOS_ADL_HIGHEST_POTENTIAL_SCORE: 68
LIGHT_TO_MODERATE_WORK: MODERATE DIFFICULTY
WALKING_APPROXIMATELY_10_MINUTES: SLIGHT DIFFICULTY
WALKING_UP_STEEP_HILLS: NO DIFFICULTY AT ALL
WALKING_INITIALLY: SLIGHT DIFFICULTY
SITTING FOR 15 MINUTES: SLIGHT DIFFICULTY
RECREATIONAL_ACTIVITIES: SLIGHT DIFFICULTY
DEEP SQUATTING: SLIGHT DIFFICULTY
HOW_WOULD_YOU_RATE_YOUR_CURRENT_LEVEL_OF_FUNCTION_DURING_YOUR_SPORTS_RELATED_ACTIVITIES_FROM_0_TO_100_WITH_100_BEING_YOUR_LEVEL_OF_FUNCTION_PRIOR_TO_YOUR_HIP_PROBLEM_AND_0_BEING_THE_INABILITY_TO_PERFORM_ANY_OF_YOUR_USUAL_DAILY_ACTIVITIES?: 65
PUTTING_ON_SOCKS_AND_SHOES: NO DIFFICULTY AT ALL
HOS_ADL_SCORE(%): 75
PLEASE_INDICATE_YOR_PRIMARY_REASON_FOR_REFERRAL_TO_THERAPY:: HIP
GOING_UP_1_FLIGHT_OF_STAIRS: SLIGHT DIFFICULTY
HEAVY_WORK: MODERATE DIFFICULTY
LANDING: MODERATE DIFFICULTY
TWISTING/PIVOTING_ON_INVOLVED_LEG: MODERATE DIFFICULTY
RECREATIONAL ACTIVITIES: SLIGHT DIFFICULTY
SPORTS_COUNT: 9
GOING_DOWN_1_FLIGHT_OF_STAIRS: SLIGHT DIFFICULTY
ADL_COUNT: 17
SWINGING_OBJECTS_LIKE_A_GOLF_CLUB: MODERATE DIFFICULTY
SPORTS_TOTAL_ITEM_SCORE: 0
SPORTS_SCORE(%): 0
STANDING_FOR_15_MINUTES: NO DIFFICULTY AT ALL
RUNNING_ONE_MILE: MODERATE DIFFICULTY
WALKING_DOWN_STEEP_HILLS: SLIGHT DIFFICULTY
LOW_IMPACT_ACTIVITIES_LIKE_FAST_WALKING: SLIGHT DIFFICULTY
ADL_HIGHEST_POTENTIAL_SCORE: 68
STEPPING_UP_AND_DOWN_CURBS: SLIGHT DIFFICULTY
STEPPING UP AND DOWN CURBS: SLIGHT DIFFICULTY
JUMPING: SLIGHT DIFFICULTY
ROLLING_OVER_IN_BED: MODERATE DIFFICULTY
PUTTING ON SOCKS AND SHOES: NO DIFFICULTY AT ALL
HOS_ADL_ITEM_SCORE_TOTAL: 51
ABILITY_TO_PERFORM_ACTIVITY_WITH_YOUR_NORMAL_TECHNIQUE: MODERATE DIFFICULTY
ROLLING OVER IN BED: MODERATE DIFFICULTY
STARTING_AND_STOPPING_QUICKLY: NO DIFFICULTY AT ALL
GOING DOWN 1 FLIGHT OF STAIRS: SLIGHT DIFFICULTY
CUTTING/LATERAL_MOVEMENTS: MODERATE DIFFICULTY
ABILITY_TO_PARTICIPATE_IN_YOUR_DESIRED_SPORT_AS_LONG_AS_YOU_WOULD_LIKE: MODERATE DIFFICULTY

## 2024-08-08 ENCOUNTER — ANCILLARY PROCEDURE (OUTPATIENT)
Dept: MAMMOGRAPHY | Facility: CLINIC | Age: 59
End: 2024-08-08
Attending: FAMILY MEDICINE
Payer: COMMERCIAL

## 2024-08-08 ENCOUNTER — OFFICE VISIT (OUTPATIENT)
Dept: URGENT CARE | Facility: URGENT CARE | Age: 59
End: 2024-08-08
Payer: COMMERCIAL

## 2024-08-08 ENCOUNTER — THERAPY VISIT (OUTPATIENT)
Dept: PHYSICAL THERAPY | Facility: CLINIC | Age: 59
End: 2024-08-08
Payer: COMMERCIAL

## 2024-08-08 VITALS
DIASTOLIC BLOOD PRESSURE: 81 MMHG | RESPIRATION RATE: 16 BRPM | TEMPERATURE: 97.9 F | HEIGHT: 63 IN | HEART RATE: 60 BPM | SYSTOLIC BLOOD PRESSURE: 128 MMHG | BODY MASS INDEX: 23.29 KG/M2 | OXYGEN SATURATION: 100 %

## 2024-08-08 DIAGNOSIS — Z12.31 VISIT FOR SCREENING MAMMOGRAM: ICD-10-CM

## 2024-08-08 DIAGNOSIS — S61.212A LACERATION OF RIGHT MIDDLE FINGER WITHOUT FOREIGN BODY WITHOUT DAMAGE TO NAIL, INITIAL ENCOUNTER: Primary | ICD-10-CM

## 2024-08-08 DIAGNOSIS — M25.551 HIP PAIN, RIGHT: ICD-10-CM

## 2024-08-08 PROCEDURE — 12002 RPR S/N/AX/GEN/TRNK2.6-7.5CM: CPT | Mod: F7 | Performed by: PHYSICIAN ASSISTANT

## 2024-08-08 PROCEDURE — 77063 BREAST TOMOSYNTHESIS BI: CPT | Mod: GC | Performed by: STUDENT IN AN ORGANIZED HEALTH CARE EDUCATION/TRAINING PROGRAM

## 2024-08-08 PROCEDURE — 97140 MANUAL THERAPY 1/> REGIONS: CPT | Mod: GP | Performed by: PHYSICAL THERAPIST

## 2024-08-08 PROCEDURE — 99207 PR NO CHARGE LOS: CPT | Performed by: PHYSICIAN ASSISTANT

## 2024-08-08 PROCEDURE — 97161 PT EVAL LOW COMPLEX 20 MIN: CPT | Mod: GP | Performed by: PHYSICAL THERAPIST

## 2024-08-08 PROCEDURE — 77067 SCR MAMMO BI INCL CAD: CPT | Mod: GC | Performed by: STUDENT IN AN ORGANIZED HEALTH CARE EDUCATION/TRAINING PROGRAM

## 2024-08-08 NOTE — PROGRESS NOTES
PHYSICAL THERAPY EVALUATION  Type of Visit: Evaluation       Fall Risk Screen:  Fall screen completed by: PT  Have you fallen 2 or more times in the past year?: No  Have you fallen and had an injury in the past year?: No  Is patient a fall risk?: No    Subjective  Bárbara reports walking two dogs and was pushing some mud out to side with right foot and had onset of right hip pain on July 13, 2024 Since then, hip pain has improved with use of ibuprofin.  Bárbara presently complains of having intermittent right lateral hip pain with no radiation. Pain is 2/10 and is dull. Worse with rotating hip and better with stretching out.       Presenting condition or subjective complaint: I injured my hip a few weeks ago. [facility] thinks I may have bursitis  Date of onset: 07/13/24    Relevant medical history: Depression; Incontinence; Menopause   Dates & types of surgery: Hysterectomy 2016. Knee surgery 2021    Prior diagnostic imaging/testing results: X-ray     Prior therapy history for the same diagnosis, illness or injury: No      Prior Level of Function  Transfers: Independent  Ambulation: Independent  ADL: Independent  IADL:  pivoting    Living Environment  Social support: Alone   Type of home: House   Stairs to enter the home: Yes 3 Is there a railing: Yes     Ramp: No   Stairs inside the home: Yes 12 Is there a railing: Yes     Help at home: None  Equipment owned:       Employment: Yes Retail  Hobbies/Interests: Biking, walking, dogs, cats, books    Patient goals for therapy: Strengthen and learn to work with it    Pain assessment:  See subjective     Objective   HIP EVALUATION  PAIN:  see subjective  INTEGUMENTARY (edema, incisions): na  POSTURE:  pes cavus (B) genu varum (B)  GAIT:   Weightbearing Status:  FWB  Assistive Device(s): None  Gait Deviations: WNL  BALANCE/PROPRIOCEPTION: Single Leg Stance Eyes Open (seconds): 30 seconds each side   WEIGHTBEARING ALIGNMENT:  na  NON-WEIGHTBEARING ALIGNMENT:  na   ROM:    (Degrees) Left AROM Left PROM  Right AROM Right PROM   Hip Flexion 100  100    Hip Extension 0  0    Hip Abduction wnl  wnl    Hip Adduction       Hip Internal Rotation 15  15    Hip External Rotation 25  25    Knee Flexion wnl  wnl    Knee Extension wnl  wnl    Lumbar Side glide     Lumbar Flexion    Lumbar Extension    Pain:   End feel:    na  PELVIC/SI SCREEN:  right inferior symphysis, right posterior psis with forward bend test positive seated   STRENGTH:   Pain: - none + mild ++ moderate +++ severe  Strength Scale: 0-5/5 Left Right   Hip Flexion 5 5-   Hip Extension 3- 3-   Hip Abduction 3+ 3+   Hip Adduction na na   Hip Internal Rotation 5 5   Hip External Rotation 5 5   Knee Flexion 5 4+   Knee Extension 5 5     LE FLEXIBILITY:  right greater than left quadricep and hip flexor ( right greater than left), and right oburator internus muscle tightness  SPECIAL TESTS:  na  FUNCTIONAL TESTS: na  PALPATION:  right greater trochanteric bursa  JOINT MOBILITY:  na    Assessment & Plan   CLINICAL IMPRESSIONS  Medical Diagnosis: Hip pain, right    Treatment Diagnosis: Hip pain, right   Impression/Assessment: Patient is a 59 year old female with right hip complaints.  The following significant findings have been identified: Pain, Decreased ROM/flexibility, Decreased joint mobility, Decreased strength, Impaired muscle performance, and Decreased activity tolerance. These impairments interfere with their ability to perform  pivoting  as compared to previous level of function.     Clinical Decision Making (Complexity):  Clinical Presentation: Stable/Uncomplicated  Clinical Presentation Rationale: based on medical and personal factors listed in PT evaluation  Clinical Decision Making (Complexity): Low complexity    PLAN OF CARE  Treatment Interventions:  Modalities: Cryotherapy, Hot Pack  Interventions: Gait Training, Manual Therapy, Neuromuscular Re-education, Therapeutic Activity, Therapeutic Exercise, Self-Care/Home  Management    Long Term Goals            Frequency of Treatment: 1x/ week  Duration of Treatment: 8 week    Recommended Referrals to Other Professionals:  none needed   Education Assessment:        Risks and benefits of evaluation/treatment have been explained.   Patient/Family/caregiver agrees with Plan of Care.     Evaluation Time:             Signing Clinician: Kady Negrete, PT        [facility]                                                                                   OUTPATIENT PHYSICAL THERAPY      PLAN OF TREATMENT FOR OUTPATIENT REHABILITATION   Patient's Last Name, First Name, ALEJANDRA AskewAliyah  BHARATHI YOB: 1965   Provider's Name   [facility]   Medical Record No.  [id number]     Onset Date: 07/13/24  Start of Care Date: 08/08/24     Medical Diagnosis:  Hip pain, right      PT Treatment Diagnosis:  Hip pain, right Plan of Treatment  Frequency/Duration: 1x/ week/ 8 week    Certification date from 08/08/24 to 10/03/24         See note for plan of treatment details and functional goals     Kady Negrete, PT                         I CERTIFY THE NEED FOR THESE SERVICES FURNISHED UNDER        THIS PLAN OF TREATMENT AND WHILE UNDER MY CARE     (Physician attestation of this document indicates review and certification of the therapy plan).              Referring Provider:  Brooklynn Leyva    Initial Assessment  See Epic Evaluation- Start of Care Date: 08/08/24

## 2024-08-08 NOTE — PROGRESS NOTES
"SUBJECTIVE:     Chief Complaint   Patient presents with    Laceration     Laceration on right hand third finger from kitchen knife   Used dog nail quick stop for bleeding and turned finger tip black      Aliyah Askew is a 59 year old female who presents to the clinic with a laceration on the right middle finger sustained 3 hour(s) ago.  This is a accidental injury.    Mechanism of injury: knife.    Associated symptoms: Denies loss of consciousness, vomiting or confusion.  Denies numbness, weakness, or loss of function  Last tetanus booster within 10 years: yes    EXAM:   The patient appears today in alert,no apparent distress distress  VITALS: /81   Pulse 60   Temp 97.9  F (36.6  C) (Temporal)   Resp 16   Ht 1.6 m (5' 3\")   LMP 06/11/2016   SpO2 100%   BMI 23.29 kg/m      Size of laceration: 4 centimeters  Characteristics of the laceration: bleeding- mild, straight, and superficial  Tendon function intact: yes  Sensation to light touch intact: yes  Pulses intact: yes  Picture included in patient's chart: no    Assessment:  (U71.207H) Laceration of right middle finger without foreign body without damage to nail, initial encounter  (primary encounter diagnosis)  Plan: REPAIR SUPERFICIAL, WOUND BODY 2.6-7.5 CM    PLAN:  PROCEDURE NOTE::  Wound was locally injected with 5 cc's of Lidocaine 2% plain  Good anesthesia was obtained  Prepped and draped in the usual sterile fashion  Wound cleaned with HIBICLENS  Wound cleaned with betadine/saline solution  Wound soaked  Wound irrigated  Laceration was closed using 7 4-0 sutures interrupted sutures  After care instructions:  Keep wound clean and dry for the next 24-48 hours  Sutures out in 9 days  Signs of infection discussed today  Apply anti-bacterial ointment for 9 days  May return to work as long as wound is kept clean and dry  Discussed the probability of scarring    "

## 2024-08-15 ENCOUNTER — THERAPY VISIT (OUTPATIENT)
Dept: PHYSICAL THERAPY | Facility: CLINIC | Age: 59
End: 2024-08-15
Payer: COMMERCIAL

## 2024-08-15 DIAGNOSIS — M25.551 HIP PAIN, RIGHT: Primary | ICD-10-CM

## 2024-08-15 PROCEDURE — 97110 THERAPEUTIC EXERCISES: CPT | Mod: GP | Performed by: PHYSICAL THERAPIST

## 2024-08-15 PROCEDURE — 97140 MANUAL THERAPY 1/> REGIONS: CPT | Mod: GP | Performed by: PHYSICAL THERAPIST

## 2024-08-21 NOTE — TELEPHONE ENCOUNTER
Caller: Aliyah Askew     Reason for Reschedule/Cancellation   (please be detailed, any staff messages or encounters to note?): having house work done and only day they can do it      Prior to reschedule please review:  Ordering Provider: Brooklynn Leyva  Sedation Determined: mod  Does patient have any ASC Exclusions, please identify?: n      Notes on Cancelled Procedure:  Procedure: Lower Endoscopy [Colonoscopy]   Date: 9/5  Location: [facility]; [address] [place] [address], [place] [address]  Surgeon:       Rescheduled: Yes,   Procedure: Lower Endoscopy [Colonoscopy]    Date: 12/13   Location: [facility] [place]; [address] [place] [address], [address] [place] [address]   Surgeon: Riccardo   Sedation Level Scheduled  mod ,  Reason for Sedation Level ordered  Instructions updated and sent: y     Does patient need PAC or Pre -Op Rescheduled? : no       Did you cancel or rescheduled an EUS procedure? No.

## 2024-08-22 ENCOUNTER — THERAPY VISIT (OUTPATIENT)
Dept: PHYSICAL THERAPY | Facility: CLINIC | Age: 59
End: 2024-08-22
Payer: COMMERCIAL

## 2024-08-22 DIAGNOSIS — M25.551 HIP PAIN, RIGHT: Primary | ICD-10-CM

## 2024-08-22 PROCEDURE — 97110 THERAPEUTIC EXERCISES: CPT | Mod: GP | Performed by: PHYSICAL THERAPIST

## 2024-08-22 PROCEDURE — 97140 MANUAL THERAPY 1/> REGIONS: CPT | Mod: GP | Performed by: PHYSICAL THERAPIST

## 2024-08-22 ASSESSMENT — ACTIVITIES OF DAILY LIVING (ADL)
ADL_TOTAL_ITEM_SCORE: 0
WALKING_INITIALLY: SLIGHT DIFFICULTY
SITTING_FOR_15_MINUTES: NO DIFFICULTY AT ALL
STEPPING_UP_AND_DOWN_CURBS: SLIGHT DIFFICULTY
WALKING_DOWN_STEEP_HILLS: NO DIFFICULTY AT ALL
RECREATIONAL ACTIVITIES: SLIGHT DIFFICULTY
ROLLING_OVER_IN_BED: NO DIFFICULTY AT ALL
GOING UP 1 FLIGHT OF STAIRS: SLIGHT DIFFICULTY
STANDING_FOR_15_MINUTES: SLIGHT DIFFICULTY
WALKING_DOWN_STEEP_HILLS: NO DIFFICULTY AT ALL
PUTTING ON SOCKS AND SHOES: NO DIFFICULTY AT ALL
DEEP_SQUATTING: SLIGHT DIFFICULTY
HOS_ADL_ITEM_SCORE_TOTAL: 52
STANDING FOR 15 MINUTES: SLIGHT DIFFICULTY
SITTING FOR 15 MINUTES: NO DIFFICULTY AT ALL
STEPPING UP AND DOWN CURBS: SLIGHT DIFFICULTY
WALKING_UP_STEEP_HILLS: NO DIFFICULTY AT ALL
GETTING_INTO_AND_OUT_OF_AN_AVERAGE_CAR: SLIGHT DIFFICULTY
ADL_COUNT: 17
HEAVY_WORK: SLIGHT DIFFICULTY
DEEP SQUATTING: SLIGHT DIFFICULTY
HEAVY_WORK: SLIGHT DIFFICULTY
GOING_UP_1_FLIGHT_OF_STAIRS: SLIGHT DIFFICULTY
HOW_WOULD_YOU_RATE_YOUR_CURRENT_LEVEL_OF_FUNCTION_DURING_YOUR_USUAL_ACTIVITIES_OF_DAILY_LIVING_FROM_0_TO_100_WITH_100_BEING_YOUR_LEVEL_OF_FUNCTION_PRIOR_TO_YOUR_HIP_PROBLEM_AND_0_BEING_THE_INABILITY_TO_PERFORM_ANY_OF_YOUR_USUAL_DAILY_ACTIVITIES?: 90
WALKING_APPROXIMATELY_10_MINUTES: SLIGHT DIFFICULTY
HOS_ADL_SCORE(%): 81.25
WALKING_INITIALLY: SLIGHT DIFFICULTY
TWISTING/PIVOTING_ON_INVOLVED_LEG: SLIGHT DIFFICULTY
ADL_HIGHEST_POTENTIAL_SCORE: 68
HOS_ADL_HIGHEST_POTENTIAL_SCORE: 64
GOING_DOWN_1_FLIGHT_OF_STAIRS: SLIGHT DIFFICULTY
RECREATIONAL_ACTIVITIES: SLIGHT DIFFICULTY
WALKING_FOR_APPROXIMATELY_10_MINUTES: SLIGHT DIFFICULTY
TWISTING/PIVOTING ON INVOLVED LEG: SLIGHT DIFFICULTY
WALKING_15_MINUTES_OR_GREATER: SLIGHT DIFFICULTY
LIGHT_TO_MODERATE_WORK: NO DIFFICULTY AT ALL
WALKING_15_MINUTES_OR_GREATER: SLIGHT DIFFICULTY
LIGHT_TO_MODERATE_WORK: NO DIFFICULTY AT ALL
GOING DOWN 1 FLIGHT OF STAIRS: SLIGHT DIFFICULTY
PUTTING_ON_SOCKS_AND_SHOES: NO DIFFICULTY AT ALL
ADL_SCORE: 0
GETTING INTO AND OUT OF AN AVERAGE CAR: SLIGHT DIFFICULTY
HOW_WOULD_YOU_RATE_YOUR_CURRENT_LEVEL_OF_FUNCTION_DURING_YOUR_USUAL_ACTIVITIES_OF_DAILY_LIVING_FROM_0_TO_100_WITH_100_BEING_YOUR_LEVEL_OF_FUNCTION_PRIOR_TO_YOUR_HIP_PROBLEM_AND_0_BEING_THE_INABILITY_TO_PERFORM_ANY_OF_YOUR_USUAL_DAILY_ACTIVITIES?: 90
WALKING_UP_STEEP_HILLS: NO DIFFICULTY AT ALL
ROLLING OVER IN BED: NO DIFFICULTY AT ALL

## 2024-08-22 NOTE — PROGRESS NOTES
DISCHARGE REPORT    Progress reporting period is from August 8, 2024 to August 22, 2024.       SUBJECTIVE  Subjective changes noted by patient:   Overall improvement has been significant. She can pivot without pain and has not functional limitations of the hip      Current pain level is 2/10  .     Previous pain level was  2/10  .   Changes in function:  Yes (See Goal flowsheet attached for changes in current functional level)  Adverse reaction to treatment or activity: None    OBJECTIVE  Changes noted in objective findings:  ROM:   (Degrees) Left AROM Left PROM  Right AROM Right PROM   Hip Flexion 100   100 with tightness     Hip Extension 0   0     Hip Abduction wnl   wnl     Hip Adduction           Hip Internal Rotation 15   15     Hip External Rotation 25   25     Knee Flexion wnl   wnl     Knee Extension wnl   wnl     Lumbar Side glide       Lumbar Flexion     Lumbar Extension     Pain:   End feel:    na  PELVIC/SI SCREEN:  normal  pubic symphysis mechanics ,  minimal right posterior psis with forward bend test positive seated   STRENGTH:   Pain: - none + mild ++ moderate +++ severe  Strength Scale: 0-5/5 Left Right   Hip Flexion 5 5-   Hip Extension 3- 3-   Hip Abduction 3+ 3+   Hip Adduction na na   Hip Internal Rotation 5 5   Hip External Rotation 5 5   Knee Flexion 5  5-    Knee Extension 5 5      LE FLEXIBILITY:  right greater than left quadricep and hip flexor ( right greater than left), and right oburator internus muscle tightness  SPECIAL TESTS:  na  FUNCTIONAL TESTS: na  PALPATION:  right greater trochanteric bursa  JOINT MOBILITY:  na                 ASSESSMENT/PLAN  Updated problem list and treatment plan: Diagnosis 1:   right hip pain   Pain -  hot/cold therapy, manual therapy, splint/taping/bracing/orthotics, self management, education, directional preference exercise, and home program  Decreased ROM/flexibility - manual therapy, therapeutic exercise, therapeutic activity, and home  program  Decreased strength - therapeutic exercise, therapeutic activities, and home program  Decreased function - therapeutic activities and home program  Instability -  Therapeutic Activity  Therapeutic Exercise  Neuromuscular Re-education  home program  STG/LTGs have been met or progress has been made towards goals:  Yes (See Goal flow sheet completed today.)  Assessment of Progress: The patient has met all of their long term goals.  Self Management Plans:  Patient has been instructed in a home treatment program.  Patient  has been instructed in self management of symptoms.  I have re-evaluated this patient and find that the nature, scope, duration and intensity of the therapy is appropriate for the medical condition of the patient.  Aliyah continues to require the following intervention to meet STG and LTG's:  PT intervention is no longer required to meet STG/LTG.    Recommendations:  This patient is ready to be discharged from therapy and continue their home treatment program.    Please refer to the daily flowsheet for treatment today, total treatment time and time spent performing 1:1 timed codes.

## 2024-09-12 ENCOUNTER — TRANSFERRED RECORDS (OUTPATIENT)
Dept: HEALTH INFORMATION MANAGEMENT | Facility: CLINIC | Age: 59
End: 2024-09-12
Payer: COMMERCIAL

## 2024-09-13 ENCOUNTER — TRANSCRIBE ORDERS (OUTPATIENT)
Dept: OTHER | Age: 59
End: 2024-09-13

## 2024-09-13 DIAGNOSIS — M54.50 LOW BACK PAIN: Primary | ICD-10-CM

## 2024-11-21 ENCOUNTER — OFFICE VISIT (OUTPATIENT)
Dept: FAMILY MEDICINE | Facility: CLINIC | Age: 59
End: 2024-11-21
Payer: COMMERCIAL

## 2024-11-21 VITALS
WEIGHT: 126 LBS | BODY MASS INDEX: 22.32 KG/M2 | OXYGEN SATURATION: 97 % | DIASTOLIC BLOOD PRESSURE: 68 MMHG | HEART RATE: 70 BPM | RESPIRATION RATE: 16 BRPM | HEIGHT: 63 IN | SYSTOLIC BLOOD PRESSURE: 106 MMHG | TEMPERATURE: 97.9 F

## 2024-11-21 DIAGNOSIS — Z13.1 SCREENING FOR DIABETES MELLITUS: ICD-10-CM

## 2024-11-21 DIAGNOSIS — N95.1 VASOMOTOR SYMPTOMS DUE TO MENOPAUSE: Primary | ICD-10-CM

## 2024-11-21 DIAGNOSIS — F41.1 GAD (GENERALIZED ANXIETY DISORDER): ICD-10-CM

## 2024-11-21 DIAGNOSIS — Z13.220 LIPID SCREENING: ICD-10-CM

## 2024-11-21 DIAGNOSIS — F33.42 RECURRENT MAJOR DEPRESSIVE DISORDER, IN FULL REMISSION (H): ICD-10-CM

## 2024-11-21 RX ORDER — ESTRADIOL 0.05 MG/D
1 PATCH, EXTENDED RELEASE TRANSDERMAL
Qty: 24 PATCH | Refills: 3 | Status: SHIPPED | OUTPATIENT
Start: 2024-11-21

## 2024-11-21 ASSESSMENT — PAIN SCALES - GENERAL: PAINLEVEL_OUTOF10: NO PAIN (0)

## 2024-11-21 NOTE — PATIENT INSTRUCTIONS
Start the estrogen patch. Change the patch twice weekly-every 3.5 days (for example, sunday morning and wednesday evening). Apply to the lower abdomen or upper buttocks and change location of the patch from one application to the next.   consultation with gynecology. If you are unsure whether to be concerned, let me know as well.

## 2024-11-21 NOTE — PROGRESS NOTES
Assessment & Plan     Menopausal syndrome (hot flashes)   Improved.      We will continue 0.05 mg estrogen twice weekly patch.  She has  few hot flashes on the patch. She would like to remain on the same dose of estrogen patch for now. refilled for the year.      Daily hot flashes, night sweats, insomnia. Has estrogen patches, but has not started yet. Discussed again today and she would like to try them.  I had actually prescription for the 0.05 mg patches. she will let me know if any concerns or if symptoms not controlled on low dose and I can adjust dose.  She will follow-up with me via telephone in 1 month.  She is status post hysterectomy-no need for endometrial protection with progesterone     Discussed risks/side effects/benefits of HT. Will start low dose estradiol patch 0.025mg twice weekly and follow up in a month and increase dose if needed at that time. Progestogen not needed as she has had a hysterectomy in 2016.  Gave resources and patient instructions as well     - estradiol (VIVELLE-DOT) 0.025 MG/24HR bi-weekly patch; Place 1 patch onto the skin twice a week  Dispense: 8 patch; Refill: 1     Urinary urgency - no complaints today    She has not tried vaginal estrogen tablets yet, but may try in the future.       Possibly GSM  Will start vaginal estrogen- she has not started yet. Reviewed instructions today. See below.   Consider return to pelvic floor therapy.  Insurance would not cover estrogen cream.  Discussed she may see some benefit from the estradiol patch as well.     - estradiol (VAGIFEM) 10 MCG TABS vaginal tablet; Place 1 tablet (10 mcg) vaginally twice a week  Dispense: 24 tablet; Refill: 3  - estradiol (VAGIFEM) 10 MCG TABS vaginal tablet; Place 1 tablet (10 mcg) vaginally daily  Dispense: 18 tablet; Refill: 0         Major depressive disorder in remission(H)   [place]  Some worsening symptoms after the election, but overall doing well.   She remains off lexapro. Feels like she is doing well.  "  Continues to grieve the loss of her mom last year, but improving.      Uncontrolled.  Grieving.  See notes above.  - escitalopram (LEXAPRO) 10 MG tablet; Take 1 tablet (10 mg) by mouth daily  Dispense: 90 tablet; Refill: 1           Elevated lipids  Working on weight loss  Doing great! She is doing nutritional weight and wellness 12 week program and has been losing weight and feels good. Plans to continue dietary changes on her own. Continues to remain off alcohol    She requests repeat lipids and A1c today       Shani Granger is a 59 year old, presenting for the following health issues:  RECHECK    History of Present Illness       Reason for visit:  Follow up   She is taking medications regularly.     Wanted to check in before the end of the year because she is unsure about her insurance situation. Would like to check A1c.     Estradiol patch. Got a skin rash from one of them. wore it longer than 3.5 days she thinks.          Objective    /68 (BP Location: Right arm, Patient Position: Sitting, Cuff Size: Adult Regular)   Pulse 70   Temp 97.9  F (36.6  C) (Temporal)   Resp 16   Ht 1.6 m (5' 3\")   Wt 57.2 kg (126 lb)   LMP 06/11/2016   SpO2 97%   BMI 22.32 kg/m    Body mass index is 22.32 kg/m .  Wt Readings from Last 5 Encounters:   11/21/24 57.2 kg (126 lb)   07/16/24 59.6 kg (131 lb 8 oz)   03/27/24 61.1 kg (134 lb 11.2 oz)   02/11/24 67.1 kg (148 lb)   07/11/23 64.4 kg (142 lb 0.8 oz)      Physical Exam   GENERAL: alert and no distress    Office Visit on 07/16/2024   Component Date Value Ref Range Status    Human Papilloma Virus 16 DNA 07/16/2024 Negative  Negative Final    Human Papilloma Virus 18 DNA 07/16/2024 Negative  Negative Final    Human Papilloma Virus Other 07/16/2024 Negative  Negative Final    FINAL DIAGNOSIS 07/16/2024    Final                    Value:This patient's sample is negative for high risk HPV DNA.                                                                           "                                                        METHODOLOGY: The [facility] system uses automated extraction, simultaneous                           amplification of HPV (E6/E7 oncogenes) and beta-globin, followed by real                           time detection of fluorescent labeled HPV and beta globin using specific                           oligonucleotide probes. The test specifically identifies types HPV 16 DNA                           and HPV 18 DNA while concurrently detecting the rest of the high risk                           types (31, 33, 35, 39, 45, 51, 52, 56, 58, 59, 66 or 68).                                                     COMMENTS: This test is not intended for use as a screening device for                           woman under age 30 with normal cervical cytology. Results should be                           correlated with cytologic and histologic findings. Close clinical follow                           up is recommended.                              Hepatitis C Antibody 07/16/2024 Nonreactive  Nonreactive Final    A nonreactive screening test result does not exclude the possibility of exposure to or infection with HCV. Nonreactive screening test results in individuals with prior exposure to HCV may be due to antibody levels below the limit of detection of this assay or lack of reactivity to the HCV antigens used in this assay. Patients with recent HCV infections (<3 months from time of exposure) may have false-negative HCV antibody results due to the time needed for seroconversion (average of 8 to 9 weeks).    Hepatitis B Core Antibody Total 07/16/2024 Nonreactive  Nonreactive Final    Nonreactive hepatitis B core antibody test results indicate the absence of exposure to hepatitis B virus and no evidence of recent, past/resolved, or chronic hepatitis B.     Hepatitis B Surface Antigen 07/16/2024 Nonreactive  Nonreactive Final    Hepatitis B Surface Antibody 07/16/2024 Nonreactive   Final     Nonreactive results, defined as anti-HBs levels of less than 8.5 mIU/mL, indicate a lack of recovery from acute or chronic hepatitis B or inadequate immune response to HBV vaccination.    Hepatitis B Surface Antibody Instr* 07/16/2024 <3.50  <8.5 m[IU]/mL Final    Interpretation 07/16/2024 Negative for Intraepithelial Lesion or Malignancy (NILM)    Final    Comment 07/16/2024    Final                    Value:                          Papanicolaou Test Limitations:  Cervical cytology is a screening test with                           limited sensitivity, and regular screening is critical for cancer                           prevention.  Pap tests are primarily effective for the                           diagnosis/prevention of squamous cell carcinoma, not adenocarcinoma or                           other cancers.                              Specimen Adequacy 07/16/2024 Satisfactory for evaluation, endocervical/transformation zone component present   Final    Clinical Information 07/16/2024    Final                    Value:post-menopausal    Previous Abnormal? 07/16/2024    Final                    Value:No    Performing Labs 07/16/2024    Final                    Value:The technical component of this testing was completed at [facility].                                                    Stain controls for all stains resulted within this report have been                           reviewed and show appropriate reactivity.           Signed Electronically by: Brooklynn Leyva MD

## 2024-12-03 ENCOUNTER — TELEPHONE (OUTPATIENT)
Dept: GASTROENTEROLOGY | Facility: CLINIC | Age: 59
End: 2024-12-03
Payer: COMMERCIAL

## 2024-12-03 NOTE — TELEPHONE ENCOUNTER
Caller: Aliyah Askew   Reason for Reschedule/Cancellation (please be detailed, any staff messages or encounters to note?):     Per pt - moving forward with Home kit       Prior to reschedule please review:  Ordering Provider:    Brooklynn Leyva MD      Sedation Determined: mod   Does patient have any ASC Exclusions, please identify?: n       Notes on Cancelled Procedure:  Procedure:Lower Endoscopy [Colonoscopy]   Date: 12/13/2024   [facility] [address] [place] [address], [address] Floor, [place] [address]  Surgeon: Riccardo         Rescheduled: no

## 2025-02-04 ENCOUNTER — NURSE TRIAGE (OUTPATIENT)
Dept: FAMILY MEDICINE | Facility: CLINIC | Age: 60
End: 2025-02-04
Payer: COMMERCIAL

## 2025-02-04 NOTE — TELEPHONE ENCOUNTER
RN COVID   02/04/25  {The patient has been triaged and does not require a higher level of care.  Patient declined Paxlovid / Covid treatment    Aliyah Askew  59 year old  Current weight? 126 #    Fever 102.0     Nasal congested  Mild HA  No SOB , wheezing or chest pain or tightness    Kaylee Hernández RN  [facility]      Reason for Disposition   COVID-19 diagnosed by positive lab test (e.g., PCR, rapid self-test kit) and NO symptoms (e.g., cough, fever, others)    Additional Information   Negative: SEVERE difficulty breathing (e.g., struggling for each breath, speaks in single words)   Negative: Difficult to awaken or acting confused (e.g., disoriented, slurred speech)   Negative: Bluish (or gray) lips or face now   Negative: Shock suspected (e.g., cold/pale/clammy skin, too weak to stand, low BP, rapid pulse)   Negative: Sounds like a life-threatening emergency to the triager   Negative: Diagnosed or suspected COVID-19 and symptoms lasting 3 or more weeks   Negative: COVID-19 exposure and no symptoms   Negative: COVID-19 vaccine reaction suspected (e.g., fever, headache, muscle aches) occurring 1 to 3 days after getting vaccine   Negative: COVID-19 vaccine, questions about   Negative: Exposure to someone known to have influenza (flu test positive) and flu-like symptoms (e.g., cough, runny nose, sore throat, SOB; with or without fever)   Negative: Possible COVID-19 symptoms and triager concerned about severity of symptoms or other causes   Negative: COVID-19 and breastfeeding, questions about   Negative: SEVERE or constant chest pain or pressure  (Exception: Mild central chest pain, present only when coughing.)   Negative: MODERATE difficulty breathing (e.g., speaks in phrases, SOB even at rest, pulse 100-120)   Negative: Headache and stiff neck (can't touch chin to chest)   Negative: Oxygen level (e.g., pulse oximetry) 90% or lower   Negative: Chest pain or pressure  (Exception: MILD  "central chest pain, present only when coughing.)   Negative: Drinking very little and dehydration suspected (e.g., no urine > 12 hours, very dry mouth, very lightheaded)   Negative: Patient sounds very sick or weak to the triager   Negative: MILD difficulty breathing (e.g., minimal/no SOB at rest, SOB with walking, pulse <100)   Negative: Fever > 103 F (39.4 C)   Negative: Fever > 101 F (38.3 C) and over 60 years of age   Negative: Fever > 100 F (37.8 C) and bedridden (e.g., CVA, chronic illness, recovering from surgery)   Negative: HIGH RISK patient (e.g., weak immune system, age > 64 years, obesity with BMI of 30 or higher, pregnant, chronic lung disease) and COVID symptoms (e.g., cough, fever) (Exceptions: Already seen by doctor or NP/PA and no new or worsening symptoms.)   Negative: HIGH RISK patient and influenza is widespread in the community and ONE OR MORE respiratory symptoms: cough, sore throat, runny or stuffy nose   Negative: HIGH RISK patient and influenza exposure within the last 7 days and ONE OR MORE respiratory symptoms: cough, sore throat, runny or stuffy nose   Negative: Patient is NOT HIGH RISK but strongly requests antiviral medicine, AND COVID-19 symptoms present < 5 days   Negative: Oxygen level (e.g., pulse oximetry) 91 to 94%   Negative: COVID-19 infection suspected and mild symptoms (cough, fever, or others) with negative COVID-19 rapid test   Negative: Fever present > 3 days (72 hours)   Negative: Fever returns after gone for over 24 hours and symptoms worse or not improved   Negative: Continuous (nonstop) coughing interferes with work or school and no improvement using cough treatment per Care Advice   Negative: Cough present > 3 weeks    Answer Assessment - Initial Assessment Questions  1. SYMPTOMS: \"What is your main symptom or concern?\" (e.g., cough, fever, shortness of breath, muscle aches)    Fever 102.0     Nasal congested  Mild HA  No SOB , wheezing or chest pain or tightness   " "    2. ONSET: \"When did the symptoms start?\"    2/3/25    3. COUGH: \"Do you have a cough?\" If Yes, ask: \"How bad is the cough?\"        No cough    4. FEVER: \"Do you have a fever?\" If Yes, ask: \"What is your temperature, how was it measured, and when did it start?\"    Fever 102.0         5. BREATHING DIFFICULTY: \"Are you having any difficulty breathing?\" (e.g., normal; shortness of breath, wheezing, unable to speak)     No SOB , wheezing or chest pain or tightness        6. BETTER-SAME-WORSE: \"Are you getting better, staying the same or getting worse compared to yesterday?\"  If getting worse, ask, \"In what way?\"      Same    7. OTHER SYMPTOMS: \"Do you have any other symptoms?\"  (e.g., chills, fatigue, headache, loss of smell or taste, muscle pain, sore throat)     Fever 102.0     Nasal congested  Mild HA  No SOB , wheezing or chest pain or tightness     Body / muscle ache    8. COVID-19 DIAGNOSIS: \"How do you know that you have COVID?\" (e.g., positive lab test or self-test, diagnosed by doctor or NP/PA, symptoms after exposure).      Tested + at work and home    9. COVID-19 EXPOSURE: \"Was there any known exposure to COVID before the symptoms began?\"         Unknown    10. COVID-19 VACCINE: \"Have you had the COVID-19 vaccine?\" If Yes, ask: \"When did you last get it?\"   11/2024         11. HIGH RISK DISEASE: \"Do you have any chronic medical problems?\" (e.g., asthma, heart or lung disease, weak immune system, obesity, etc.)        None    12. PREGNANCY: \"Is there any chance you are pregnant?\" \"When was your last menstrual period?\"        N/A    13. O2 SATURATION MONITOR:  \"Do you use an oxygen saturation monitor (pulse oximeter) at home?\" If Yes, ask \"What is your reading (oxygen level) today?\" \"What is your usual oxygen saturation reading?\" (e.g., 95%)        N/A    Protocols used: COVID-19 - Diagnosed or [id number]    "

## 2025-02-13 ENCOUNTER — NURSE TRIAGE (OUTPATIENT)
Dept: FAMILY MEDICINE | Facility: CLINIC | Age: 60
End: 2025-02-13
Payer: COMMERCIAL

## 2025-02-13 NOTE — TELEPHONE ENCOUNTER
"Call received from patient:  COVID-19 diagnosed last week  Symptoms began 2/3/25  Symptoms mild  Continues to have fatigue  COVID-19 test yesterday was positive  Nasal canal was bloody  No cough  \"A rough feeling\"  Able to breath deeply without issue  Bike commuted on 2/10/25 and stopped as it was too much  No wheezing  A little bit of a sore throat  Walks dog without any issues  Not SOB  No chest pain  Overall felling improvement in symptoms  Wonders about wearing mask    Patient spoke in full, complete sentences during phone call.    Discussed with patient she does not need to test for \"cure\" of COVID-19, as people can continue to test positive up to around 90 days after initial infection.    Writer also reviewed with patient:  If you DO have symptoms:  Stay home and away from others (self-isolate). You can go back to being with other people when:  You've had no fever for 24 hours--without taking any medicine that reduces fever, AND  Your other symptoms (such as a cough) are better.  Wear a mask or face covering for 5 full days anytime you're around other people.    Patient verbalized understanding and in agreement with plan.    Encouraged patient to call back and ask to speak with a nurse for any new, changing or worsening symptoms. Registered Nurses are available via phone 24/7.    Thank you!  LAI CristinaN, RN-[facility]      Reason for Disposition   COVID-19 diagnosed by positive lab test (e.g., PCR, rapid self-test kit) and mild symptoms (e.g., cough, fever, others) and no complications or SOB    Additional Information   Negative: SEVERE difficulty breathing (e.g., struggling for each breath, speaks in single words)   Negative: Difficult to awaken or acting confused (e.g., disoriented, slurred speech)   Negative: Bluish (or gray) lips or face now   Negative: Shock suspected (e.g., cold/pale/clammy skin, too weak to stand, low BP, rapid pulse)   Negative: Sounds like a " life-threatening emergency to the triager   Negative: Diagnosed or suspected COVID-19 and symptoms lasting 3 or more weeks   Negative: COVID-19 exposure and no symptoms   Negative: COVID-19 vaccine reaction suspected (e.g., fever, headache, muscle aches) occurring 1 to 3 days after getting vaccine   Negative: COVID-19 vaccine, questions about   Negative: Exposure to someone known to have influenza (flu test positive) and flu-like symptoms (e.g., cough, runny nose, sore throat, SOB; with or without fever)   Negative: Possible COVID-19 symptoms and triager concerned about severity of symptoms or other causes   Negative: COVID-19 and breastfeeding, questions about   Negative: SEVERE or constant chest pain or pressure  (Exception: Mild central chest pain, present only when coughing.)   Negative: MODERATE difficulty breathing (e.g., speaks in phrases, SOB even at rest, pulse 100-120)   Negative: Headache and stiff neck (can't touch chin to chest)   Negative: Oxygen level (e.g., pulse oximetry) 90% or lower   Negative: Chest pain or pressure  (Exception: MILD central chest pain, present only when coughing.)   Negative: Drinking very little and dehydration suspected (e.g., no urine > 12 hours, very dry mouth, very lightheaded)   Negative: Patient sounds very sick or weak to the triager   Negative: MILD difficulty breathing (e.g., minimal/no SOB at rest, SOB with walking, pulse <100)   Negative: Fever > 103 F (39.4 C)   Negative: Fever > 101 F (38.3 C) and over 60 years of age   Negative: Fever > 100 F (37.8 C) and bedridden (e.g., CVA, chronic illness, recovering from surgery)   Negative: HIGH RISK patient (e.g., weak immune system, age > 64 years, obesity with BMI of 30 or higher, pregnant, chronic lung disease) and COVID symptoms (e.g., cough, fever) (Exceptions: Already seen by doctor or NP/PA and no new or worsening symptoms.)   Negative: HIGH RISK patient and influenza is widespread in the community and ONE OR MORE  respiratory symptoms: cough, sore throat, runny or stuffy nose   Negative: HIGH RISK patient and influenza exposure within the last 7 days and ONE OR MORE respiratory symptoms: cough, sore throat, runny or stuffy nose   Negative: Patient is NOT HIGH RISK but strongly requests antiviral medicine, AND COVID-19 symptoms present < 5 days   Negative: Oxygen level (e.g., pulse oximetry) 91 to 94%   Negative: COVID-19 infection suspected and mild symptoms (cough, fever, or others) with negative COVID-19 rapid test   Negative: Fever present > 3 days (72 hours)   Negative: Fever returns after gone for over 24 hours and symptoms worse or not improved   Negative: Continuous (nonstop) coughing interferes with work or school and no improvement using cough treatment per Care Advice   Negative: Cough present > 3 weeks   Negative: COVID-19 diagnosed by positive lab test (e.g., PCR, rapid self-test kit) and NO symptoms (e.g., cough, fever, others)    Protocols used: COVID-19 - Diagnosed or [id number]

## 2025-06-24 ENCOUNTER — TRANSCRIBE ORDERS (OUTPATIENT)
Dept: OTHER | Age: 60
End: 2025-06-24

## 2025-06-24 DIAGNOSIS — M54.50 LOW BACK PAIN: Primary | ICD-10-CM

## 2025-07-09 ENCOUNTER — PATIENT OUTREACH (OUTPATIENT)
Dept: CARE COORDINATION | Facility: CLINIC | Age: 60
End: 2025-07-09

## 2025-07-11 ENCOUNTER — TELEPHONE (OUTPATIENT)
Dept: FAMILY MEDICINE | Facility: CLINIC | Age: 60
End: 2025-07-11
Payer: COMMERCIAL

## 2025-07-11 NOTE — TELEPHONE ENCOUNTER
Dr. Leyva --    Patient would like to have her blood pressure checked (on MA schedule) if possible on 7/15 when she comes in for lab appointment.     Olesya Robertson, LAIN RN  [facility]

## 2025-07-14 NOTE — TELEPHONE ENCOUNTER
I'm not sure what to do with this. Let me know if something is needed from me. Thanks, Brooklynn Leyva M.D.

## 2025-07-14 NOTE — TELEPHONE ENCOUNTER
No available appointments for MA 7/15/2025 and not sure what staffing is looking like 7/15/2025 to double book.    LVM/My-chart message informing the PT and offering other option.  Candie Worley MA on 7/14/2025 at 2:15 PM

## 2025-07-14 NOTE — TELEPHONE ENCOUNTER
Clinic Care Team-  Please call patient and offer MA appt for BP check if any openings on 7/15/25.  Otherwise, patient is welcome to schedule a pharmacy blood pressure check: [email or web address]      Thank you!  LAI CristinaN, RN-[facility]

## 2025-07-15 ENCOUNTER — TELEPHONE (OUTPATIENT)
Dept: FAMILY MEDICINE | Facility: CLINIC | Age: 60
End: 2025-07-15

## 2025-07-15 ENCOUNTER — LAB (OUTPATIENT)
Dept: LAB | Facility: CLINIC | Age: 60
End: 2025-07-15
Payer: COMMERCIAL

## 2025-07-15 DIAGNOSIS — Z13.1 SCREENING FOR DIABETES MELLITUS: ICD-10-CM

## 2025-07-15 DIAGNOSIS — Z13.220 LIPID SCREENING: ICD-10-CM

## 2025-07-15 LAB
CHOLEST SERPL-MCNC: 186 MG/DL
EST. AVERAGE GLUCOSE BLD GHB EST-MCNC: 105 MG/DL
FASTING STATUS PATIENT QL REPORTED: YES
HBA1C MFR BLD: 5.3 % (ref 0–5.6)
HDLC SERPL-MCNC: 72 MG/DL
LDLC SERPL CALC-MCNC: 102 MG/DL
NONHDLC SERPL-MCNC: 114 MG/DL
TRIGL SERPL-MCNC: 58 MG/DL

## 2025-07-15 PROCEDURE — 36415 COLL VENOUS BLD VENIPUNCTURE: CPT

## 2025-07-15 PROCEDURE — 83036 HEMOGLOBIN GLYCOSYLATED A1C: CPT

## 2025-07-15 PROCEDURE — 80061 LIPID PANEL: CPT

## 2025-07-15 NOTE — TELEPHONE ENCOUNTER
Reason for Call:  Appointment Request    Patient requesting this type of appt:  Labs ordered by pcp Brooklynn Leyva     Requested provider: MA/VF/LPN Visit    Reason patient unable to be scheduled: Not within requested timeframe    When does patient want to be seen/preferred time: Same day - Available anytime, but asap.      Comments: Pt was on her way to her appt, and a dog was running lose. Pt helped get the dog to safety. Pt is still wondering if she was able to to still come in today for labs.     Could we send this information to you in [employer] or would you prefer to receive a phone call?:   Patient would prefer a phone call   Okay to leave a detailed message?: Yes at Cell number on file:    Telephone Information:   Mobile [phone]        Call taken on 7/15/2025 at 10:35 AM by Vaughn Shearer

## 2025-07-15 NOTE — TELEPHONE ENCOUNTER
Called patient and informed her that she is still able to come in for her lab appointment, but just a bit of a wait time because she is late.

## 2025-07-22 ENCOUNTER — NURSE TRIAGE (OUTPATIENT)
Dept: FAMILY MEDICINE | Facility: CLINIC | Age: 60
End: 2025-07-22
Payer: COMMERCIAL

## 2025-07-22 NOTE — TELEPHONE ENCOUNTER
SEE IN OFFICE WITHIN 3 DAYS:   * You need to be examined.   * Let me give you an appointment.  * Scheduled appointment on 7/24/25 at 1315.    Reason for Disposition   MILD dizziness (e.g., walking normally) and has NOT been evaluated by physician for this (Exception: Dizziness caused by heat exposure, sudden standing, or poor fluid intake.)    Additional Information   Negative: SEVERE difficulty breathing (e.g., struggling for each breath, speaks in single words)   Negative: Shock suspected (e.g., cold/pale/clammy skin, too weak to stand, low BP, rapid pulse)   Negative: Difficult to awaken or acting confused (e.g., disoriented, slurred speech)   Negative: Fainted, and still feels dizzy afterwards   Negative: Overdose (accidental or intentional) of medications   Negative: New neurologic deficit that is present now: * Weakness of the face, arm, or leg on one side of the body * Numbness of the face, arm, or leg on one side of the body * Loss of speech or garbled speech   Negative: Heart beating < 50 beats per minute OR > 140 beats per minute   Negative: Sounds like a life-threatening emergency to the triager   Negative: Chest pain   Negative: Rectal bleeding, bloody stool, or tarry-black stool   Negative: Vomiting is main symptom   Negative: Diarrhea is main symptom   Negative: Headache is main symptom   Negative: Heat exhaustion suspected (i.e., dehydration from heat exposure)   Negative: Patient states that they are having an anxiety or panic attack   Negative: Dizziness from low blood sugar (i.e., < 60 mg/dl or 3.5 mmol/l)   Negative: SEVERE dizziness (e.g., unable to stand, requires support to walk, feels like passing out now)   Negative: SEVERE headache or neck pain   Negative: Spinning or tilting sensation (vertigo) present now and one or more stroke risk factors (i.e., hypertension, diabetes mellitus, prior stroke/TIA, heart attack, age over 60) (Exception: Prior physician evaluation for this AND no  "different/worse than usual.)   Negative: Neurologic deficit that was brief (now gone), ANY of the following:* Weakness of the face, arm, or leg on one side of the body* Numbness of the face, arm, or leg on one side of the body* Loss of speech or garbled speech   Negative: Loss of vision or double vision  (Exception: Similar to previous migraines.)   Negative: Extra heartbeats, irregular heart beating, or heart is beating very fast (i.e., 'palpitations')   Negative: Difficulty breathing   Negative: Drinking very little and dehydration suspected (e.g., no urine > 12 hours, very dry mouth, very lightheaded)   Negative: Follows bleeding (e.g., stomach, rectum, vagina)  (Exception: Became dizzy from sight of small amount blood.)   Negative: Patient sounds very sick or weak to the triager   Negative: Taking a medicine that could cause dizziness (e.g., blood pressure medications, diuretics)   Negative: MODERATE dizziness (e.g., interferes with normal activities)  (Exception: Dizziness caused by heat exposure, sudden standing, or poor fluid intake.)   Negative: Vomiting occurs with dizziness   Negative: Patient wants to be seen   Negative: Lightheadedness (dizziness) present now, after 2 hours of rest and fluids   Negative: Spinning or tilting sensation (vertigo) present now   Negative: Fever > 103 F (39.4 C)   Negative: Fever > 100.0 F (37.8 C) and has diabetes mellitus or a weak immune system (e.g., HIV positive, cancer chemotherapy, organ transplant, splenectomy, chronic steroids)    Answer Assessment - Initial Assessment Questions  1. DESCRIPTION: \"Describe your dizziness.\"      It feels mostly in my head area. If I close my eyes, there is a little bit of a balance issue  2. LIGHTHEADED: \"Do you feel lightheaded?\" (e.g., somewhat faint, woozy, weak upon standing)      No  3. VERTIGO: \"Do you feel like either you or the room is spinning or tilting?\" (i.e. vertigo)      Depends on what I am doing. I feel like the room is " "spinning a little bit  4. SEVERITY: \"How bad is it?\"  \"Do you feel like you are going to faint?\" \"Can you stand and walk?\"    - MILD: Feels slightly dizzy, but walking normally.  5. ONSET:  \"When did the dizziness begin?\"      Late yesterday 7/21/25  6. AGGRAVATING FACTORS: \"Does anything make it worse?\" (e.g., standing, change in head position)      No  7. HEART RATE: \"Can you tell me your heart rate?\" \"How many beats in 15 seconds?\"  (Note: not all patients can do this)        N/A  8. CAUSE: \"What do you think is causing the dizziness?\"      I don't know  9. RECURRENT SYMPTOM: \"Have you had dizziness before?\" If Yes, ask: \"When was the last time?\" \"What happened that time?\"      A couple of years ago, it comes and goes. No treatment  10. OTHER SYMPTOMS: \"Do you have any other symptoms?\" (e.g., fever, chest pain, vomiting, diarrhea, bleeding)        No  11. PREGNANCY: \"Is there any chance you are pregnant?\" \"When was your last menstrual period?\"        N/A    Protocols used: Dizziness-A-OH    "

## 2025-07-24 ENCOUNTER — OFFICE VISIT (OUTPATIENT)
Dept: FAMILY MEDICINE | Facility: CLINIC | Age: 60
End: 2025-07-24
Payer: COMMERCIAL

## 2025-07-24 VITALS
TEMPERATURE: 97.1 F | DIASTOLIC BLOOD PRESSURE: 80 MMHG | RESPIRATION RATE: 18 BRPM | HEART RATE: 73 BPM | OXYGEN SATURATION: 97 % | BODY MASS INDEX: 22.82 KG/M2 | HEIGHT: 62 IN | WEIGHT: 124 LBS | SYSTOLIC BLOOD PRESSURE: 119 MMHG

## 2025-07-24 DIAGNOSIS — R42 DIZZINESS: Primary | ICD-10-CM

## 2025-07-24 DIAGNOSIS — N39.41 URGE INCONTINENCE: ICD-10-CM

## 2025-07-24 DIAGNOSIS — Z90.710 S/P HYSTERECTOMY: ICD-10-CM

## 2025-07-24 ASSESSMENT — PAIN SCALES - GENERAL: PAINLEVEL_OUTOF10: NO PAIN (0)

## 2025-07-24 NOTE — PROGRESS NOTES
"  Assessment & Plan     Dizziness  Suspect vestibular that may have been exacerbated by recent dental work and inflammation.  She has found tylenol helpful and can continue this.  Also discussed hydration and trigger avoidance.  She is agreeable to PT referral today.  Follow up if symptoms worsen or persist.   - Physical Therapy  Referral; Future    S/P hysterectomy  Urge incontinence  Previously did pelvic floor therapy without much improvement in her symptoms.  Will refer to urogynecology to discuss other possible interventions.  - Adult Uro/Gyn  Referral; Future        Subjective   Bárbara is a 60 year old, presenting for the following health issues:  Dizziness        7/24/2025     1:16 PM   Additional Questions   Roomed by Tiana JORGENSEN     History of Present Illness       Reason for visit:  Dizziness   She is taking medications regularly.      Dizziness on and off for a couple years  Came up over the last couple weeks again.    Would like to see a urologist due to bladder control- did PT after hysterectomy.  As soon as she feels she has to urinate, will have urgency. Overall managing, but has had an accident in the past.      Still drinks coffee in the morning  Movement makes it worse.  But overall, still very active.    Wants a stress test for muscle pain in chest.     Just had serious dental worse, not done yet.    Bikes with an n95          Objective    /80 (BP Location: Right arm, Patient Position: Sitting, Cuff Size: Adult Regular)   Pulse 73   Temp 97.1  F (36.2  C) (Temporal)   Resp 18   Ht 1.575 m (5' 2\")   Wt 56.2 kg (124 lb)   LMP 06/11/2016   SpO2 97%   BMI 22.68 kg/m    Body mass index is 22.68 kg/m .  Physical Exam   GENERAL: alert and no distress  EYES: Eyes grossly normal to inspection, PERRL and conjunctivae and sclerae normal  HENT: ear canals and TM's normal, nose and mouth without ulcers or lesions  NECK: no adenopathy, no asymmetry, masses, or scars  MS: no gross " musculoskeletal defects noted, no edema  NEURO: Normal strength and tone, sensory exam grossly normal, mentation intact, cranial nerves 2-12 intact, and Romberg normal            Signed Electronically by: Silvia Ponce DNP

## 2025-07-28 ENCOUNTER — PATIENT OUTREACH (OUTPATIENT)
Dept: CARE COORDINATION | Facility: CLINIC | Age: 60
End: 2025-07-28
Payer: COMMERCIAL

## 2025-10-02 ENCOUNTER — PRE VISIT (OUTPATIENT)
Dept: UROLOGY | Facility: CLINIC | Age: 60
End: 2025-10-02